# Patient Record
Sex: FEMALE | Race: WHITE | Employment: UNEMPLOYED | URBAN - METROPOLITAN AREA
[De-identification: names, ages, dates, MRNs, and addresses within clinical notes are randomized per-mention and may not be internally consistent; named-entity substitution may affect disease eponyms.]

---

## 2019-07-24 ENCOUNTER — HOSPITAL ENCOUNTER (OUTPATIENT)
Age: 24
Discharge: HOME | End: 2019-07-24

## 2019-07-24 ENCOUNTER — OFFICE VISIT (OUTPATIENT)
Dept: PULMONOLOGY | Age: 24
End: 2019-07-24
Payer: COMMERCIAL

## 2019-07-24 VITALS
SYSTOLIC BLOOD PRESSURE: 135 MMHG | HEART RATE: 96 BPM | RESPIRATION RATE: 18 BRPM | BODY MASS INDEX: 28.85 KG/M2 | OXYGEN SATURATION: 98 % | WEIGHT: 169 LBS | DIASTOLIC BLOOD PRESSURE: 85 MMHG | HEIGHT: 64 IN

## 2019-07-24 DIAGNOSIS — J45.41 MODERATE PERSISTENT ASTHMA WITH ACUTE EXACERBATION: Primary | ICD-10-CM

## 2019-07-24 DIAGNOSIS — K21.9 GASTROESOPHAGEAL REFLUX DISEASE WITHOUT ESOPHAGITIS: ICD-10-CM

## 2019-07-24 DIAGNOSIS — J45.41 MODERATE PERSISTENT ASTHMA WITH ACUTE EXACERBATION: ICD-10-CM

## 2019-07-24 LAB
BASOPHILS ABSOLUTE: 0.1 K/UL (ref 0–0.2)
BASOPHILS RELATIVE PERCENT: 0.7 %
EOSINOPHILS ABSOLUTE: 0.1 K/UL (ref 0–0.6)
EOSINOPHILS RELATIVE PERCENT: 0.8 %
HCT VFR BLD CALC: 45.1 % (ref 36–48)
HEMOGLOBIN: 15.2 G/DL (ref 12–16)
LYMPHOCYTES ABSOLUTE: 3.3 K/UL (ref 1–5.1)
LYMPHOCYTES RELATIVE PERCENT: 28.5 %
MCH RBC QN AUTO: 30.7 PG (ref 26–34)
MCHC RBC AUTO-ENTMCNC: 33.7 G/DL (ref 31–36)
MCV RBC AUTO: 91.2 FL (ref 80–100)
MONOCYTES ABSOLUTE: 0.9 K/UL (ref 0–1.3)
MONOCYTES RELATIVE PERCENT: 7.5 %
NEUTROPHILS ABSOLUTE: 7.1 K/UL (ref 1.7–7.7)
NEUTROPHILS RELATIVE PERCENT: 62.5 %
PDW BLD-RTO: 12.9 % (ref 12.4–15.4)
PLATELET # BLD: 349 K/UL (ref 135–450)
PMV BLD AUTO: 7.7 FL (ref 5–10.5)
RBC # BLD: 4.94 M/UL (ref 4–5.2)
WBC # BLD: 11.4 K/UL (ref 4–11)

## 2019-07-24 PROCEDURE — 99204 OFFICE O/P NEW MOD 45 MIN: CPT | Performed by: INTERNAL MEDICINE

## 2019-07-24 PROCEDURE — 85025 COMPLETE CBC W/AUTO DIFF WBC: CPT

## 2019-07-24 PROCEDURE — 82785 ASSAY OF IGE: CPT

## 2019-07-24 PROCEDURE — 86003 ALLG SPEC IGE CRUDE XTRC EA: CPT

## 2019-07-24 PROCEDURE — 36415 COLL VENOUS BLD VENIPUNCTURE: CPT

## 2019-07-24 RX ORDER — FLUTICASONE FUROATE AND VILANTEROL 200; 25 UG/1; UG/1
1 POWDER RESPIRATORY (INHALATION) DAILY
Qty: 1 EACH | Refills: 5 | Status: ON HOLD | OUTPATIENT
Start: 2019-07-24 | End: 2021-12-03

## 2019-07-24 RX ORDER — PREDNISONE 10 MG/1
10 TABLET ORAL 2 TIMES DAILY
Qty: 60 TABLET | Refills: 11 | Status: SHIPPED | OUTPATIENT
Start: 2019-07-24 | End: 2019-08-23

## 2019-07-24 RX ORDER — FLUTICASONE PROPIONATE 50 MCG
1 SPRAY, SUSPENSION (ML) NASAL DAILY
Status: ON HOLD | COMMUNITY
End: 2021-12-03

## 2019-07-24 RX ORDER — ALBUTEROL SULFATE 90 UG/1
2 AEROSOL, METERED RESPIRATORY (INHALATION) EVERY 6 HOURS PRN
Status: ON HOLD | COMMUNITY
End: 2022-09-11 | Stop reason: HOSPADM

## 2019-07-24 RX ORDER — MONTELUKAST SODIUM 10 MG/1
10 TABLET ORAL NIGHTLY
COMMUNITY
End: 2019-09-18 | Stop reason: SDUPTHER

## 2019-07-24 ASSESSMENT — ENCOUNTER SYMPTOMS
NAUSEA: 0
SINUS PRESSURE: 0
DIARRHEA: 0
ABDOMINAL PAIN: 0
CONSTIPATION: 0

## 2019-07-24 NOTE — PROGRESS NOTES
[facility]  Consult Note  Kisha Stinson MD       Lazarus Lora   YOB: 1995    Date of Visit:  7/24/2019    Assessment/Plan:  1. Moderate persistent asthma with acute exacerbation  Having an active flare up  Put her back on Prednisone 20 mg per day  Start her on Breo 200  Continue Singulair and albuterol    Have her get CBC with diff and Resp allergen profile before resuming Prednisone    2. Gastroesophageal reflux disease without esophagitis  Start her on Prilosec QHS as GERD could be a trigger for her asthma. Chief Complaint   Patient presents with    Asthma     NPV - pt is a self referral for her Asthma        HPI  The patient presents for evaluation of asthma. She is having a lot of cough and chest pain. She is taking her Albuterol Q4h. She is also taking Singulair. She has had a lot of flare ups. She has had several rounds of Prednisone which helped temporarily. She is still having chest pain and coughing a lot. She did take [facility] when she was a teenager. She is having GERD symptoms as well. She blames this on Singulair. She has more trouble when it is cold. She is getting sinus drainage. She has two cats. She does not have a feather pillow or down comforter. Review of Systems  Review of Systems   Constitutional: Negative for fatigue and fever. HENT: Negative for congestion and sinus pressure. Eyes: Negative for visual disturbance. Cardiovascular: Negative for chest pain and palpitations. Gastrointestinal: Negative for abdominal pain, constipation, diarrhea and nausea. Genitourinary: Negative for difficulty urinating. Musculoskeletal: Negative for arthralgias. Skin: Negative for rash. Neurological: Negative for dizziness and light-headedness. Hematological: Does not bruise/bleed easily. Psychiatric/Behavioral: Negative for behavioral problems. History  I have reviewed past medical, surgical, social and family history. 1129   BP: 135/85   Pulse: 96   Resp: 18   SpO2: 98%   Weight: 169 lb (76.7 kg)   Height: 5' 4\" (1.626 m)     Body mass index is 29.01 kg/m².      Wt Readings from Last 3 Encounters:   07/24/19 169 lb (76.7 kg)     BP Readings from Last 3 Encounters:   07/24/19 135/85        Social History     Tobacco Use   Smoking Status Never Smoker   Smokeless Tobacco Never Used

## 2019-07-27 LAB
2000687N OAK TREE IGE: <0.1 KU/L
ALLERGEN ASPERGILLUS ALTERNATA IGE: <0.1 KU/L
ALLERGEN ASPERGILLUS FUMIGATUS IGE: <0.1 KU/L
ALLERGEN BERMUDA GRASS IGE: <0.1 KU/L
ALLERGEN BIRCH IGE: <0.1 KU/L
ALLERGEN CAT DANDER IGE: <0.1 KU/L
ALLERGEN COMMON SHORT RAGWEED IGE: <0.1 KU/L
ALLERGEN COTTONWOOD: <0.1 KU/L
ALLERGEN COW MILK IGE: <0.1 KU/L
ALLERGEN DOG DANDER IGE: <0.1 KU/L
ALLERGEN ELM IGE: <0.1 KU/L
ALLERGEN FUNGI/MOLD M.RACEMOSUS IGE: <0.1 KU/L
ALLERGEN GERMAN COCKROACH IGE: <0.1 KU/L
ALLERGEN HORMODENDRUM HORDEI IGE: <0.1 KU/L
ALLERGEN MAPLE/BOX ELDER IGE: <0.1 KU/L
ALLERGEN MITE DUST FARINAE IGE: <0.1 KU/L
ALLERGEN MITE DUST PTERONYSSINUS IGE: <0.1 KU/L
ALLERGEN MOUNTAIN CEDAR: <0.1 KU/L
ALLERGEN MOUSE EPITHELIA IGE: <0.1 KU/L
ALLERGEN PEANUT (F13) IGE: <0.1 KU/L
ALLERGEN PECAN TREE IGE: <0.1 KU/L
ALLERGEN PENICILLIUM NOTATUM: <0.1 KU/L
ALLERGEN ROUGH PIGWEED (W14) IGE: <0.1 KU/L
ALLERGEN RUSSIAN THISTLE IGE: <0.1 KU/L
ALLERGEN SEE NOTE: NORMAL
ALLERGEN SHEEP SORREL (W18) IGE: <0.1 KU/L
ALLERGEN TIMOTHY GRASS: <0.1 KU/L
ALLERGEN TREE SYCAMORE: <0.1 KU/L
ALLERGEN WALNUT TREE IGE: <0.1 KU/L
ALLERGEN WHITE MULBERRY TREE, IGE: <0.1 KU/L
ALLERGEN, TREE, WHITE ASH IGE: <0.1 KU/L
IGE: 44 KU/L

## 2019-07-31 RX ORDER — ALBUTEROL SULFATE 90 UG/1
2 AEROSOL, METERED RESPIRATORY (INHALATION) EVERY 6 HOURS PRN
Qty: 1 INHALER | Refills: 5 | OUTPATIENT
Start: 2019-07-31

## 2019-08-07 ENCOUNTER — OFFICE VISIT (OUTPATIENT)
Dept: PULMONOLOGY | Age: 24
End: 2019-08-07
Payer: COMMERCIAL

## 2019-08-07 VITALS
WEIGHT: 173 LBS | RESPIRATION RATE: 16 BRPM | SYSTOLIC BLOOD PRESSURE: 118 MMHG | BODY MASS INDEX: 29.7 KG/M2 | HEART RATE: 67 BPM | OXYGEN SATURATION: 98 % | DIASTOLIC BLOOD PRESSURE: 66 MMHG

## 2019-08-07 DIAGNOSIS — J45.40 MODERATE PERSISTENT ASTHMA WITHOUT COMPLICATION: Primary | ICD-10-CM

## 2019-08-07 DIAGNOSIS — K21.9 GERD WITHOUT ESOPHAGITIS: ICD-10-CM

## 2019-08-07 PROCEDURE — 99213 OFFICE O/P EST LOW 20 MIN: CPT | Performed by: NURSE PRACTITIONER

## 2019-08-07 ASSESSMENT — ENCOUNTER SYMPTOMS
CHEST TIGHTNESS: 1
COLOR CHANGE: 0
COUGH: 1
CONSTIPATION: 0
ABDOMINAL PAIN: 0

## 2019-08-07 NOTE — PROGRESS NOTES
[facility]    Subjective:   CHIEF COMPLAINT / HPI: Recent asthma flare   The patient is 26 y.o. female who presents today for a routine follow up visit related to the above mentioned issues. There is a PMH significant for GERD. She was evaluated by Dr. Sheryl Zuleta last month. At that time symptoms were worse and she was treated for asthma flare with Prednisone. CBC, IgE and respiratory allergen profile were also requested. Presently she reports breathing improvement to 80% of baseline. She still has somewhat of a dry cough but this is much improved as well There are no fevers or chills. She does still report some chest tightness. She is back to doing some Yoga. She reports compliance with daily Breo, q4 GREG and QD Singulair. She is also taking Prilosec at night with some GERD symptoms persisting. History reviewed. No pertinent past medical history. Social History:    Social History     Tobacco Use   Smoking Status Never Smoker   Smokeless Tobacco Never Used     Current Medications:     Current Outpatient Medications on File Prior to Visit   Medication Sig Dispense Refill    montelukast (SINGULAIR) 10 MG tablet Take 10 mg by mouth nightly      fluticasone (FLONASE ALLERGY RELIEF) 50 MCG/ACT nasal spray 1 spray by Each Nostril route daily      diphenhydrAMINE HCl (BENADRYL ALLERGY PO) Take by mouth      albuterol sulfate  (90 Base) MCG/ACT inhaler Inhale 2 puffs into the lungs every 6 hours as needed for Wheezing      predniSONE (DELTASONE) 10 MG tablet Take 1 tablet by mouth 2 times daily 60 tablet 11    Fluticasone Furoate-Vilanterol (BREO ELLIPTA) 200-25 MCG/INH AEPB Inhale 1 puff into the lungs daily 1 each 5     No current facility-administered medications on file prior to visit. Review of Systems   Constitutional: Negative for chills and fever. HENT: Negative for congestion and postnasal drip.     Respiratory: Positive for cough and chest

## 2019-09-18 RX ORDER — MONTELUKAST SODIUM 10 MG/1
10 TABLET ORAL NIGHTLY
Qty: 30 TABLET | Refills: 5 | Status: ON HOLD | OUTPATIENT
Start: 2019-09-18 | End: 2021-12-03

## 2020-11-14 ENCOUNTER — APPOINTMENT (OUTPATIENT)
Dept: CT IMAGING | Age: 25
End: 2020-11-14
Payer: COMMERCIAL

## 2020-11-14 ENCOUNTER — HOSPITAL ENCOUNTER (EMERGENCY)
Age: 25
Discharge: HOME | End: 2020-11-14
Attending: EMERGENCY MEDICINE
Payer: COMMERCIAL

## 2020-11-14 VITALS
RESPIRATION RATE: 18 BRPM | TEMPERATURE: 98 F | BODY MASS INDEX: 35.05 KG/M2 | OXYGEN SATURATION: 99 % | HEART RATE: 71 BPM | SYSTOLIC BLOOD PRESSURE: 112 MMHG | WEIGHT: 190.48 LBS | DIASTOLIC BLOOD PRESSURE: 79 MMHG | HEIGHT: 62 IN

## 2020-11-14 LAB
ALBUMIN SERPL-MCNC: 4.5 G/DL (ref 3.4–5)
ALP BLD-CCNC: 46 U/L (ref 40–129)
ALT SERPL-CCNC: 15 U/L (ref 10–40)
ANION GAP SERPL CALCULATED.3IONS-SCNC: 10 MMOL/L (ref 3–16)
AST SERPL-CCNC: 16 U/L (ref 15–37)
BASOPHILS ABSOLUTE: 0.1 K/UL (ref 0–0.2)
BASOPHILS RELATIVE PERCENT: 0.7 %
BILIRUB SERPL-MCNC: 0.4 MG/DL (ref 0–1)
BILIRUBIN DIRECT: <0.2 MG/DL (ref 0–0.3)
BILIRUBIN URINE: NEGATIVE
BILIRUBIN, INDIRECT: NORMAL MG/DL (ref 0–1)
BLOOD, URINE: NEGATIVE
BUN BLDV-MCNC: 13 MG/DL (ref 7–20)
CALCIUM SERPL-MCNC: 9.7 MG/DL (ref 8.3–10.6)
CHLORIDE BLD-SCNC: 102 MMOL/L (ref 99–110)
CLARITY: CLEAR
CO2: 26 MMOL/L (ref 21–32)
COLOR: YELLOW
CREAT SERPL-MCNC: 0.8 MG/DL (ref 0.6–1.1)
EOSINOPHILS ABSOLUTE: 0.1 K/UL (ref 0–0.6)
EOSINOPHILS RELATIVE PERCENT: 1.3 %
GFR AFRICAN AMERICAN: >60
GFR NON-AFRICAN AMERICAN: >60
GLUCOSE BLD-MCNC: 93 MG/DL (ref 70–99)
GLUCOSE URINE: NEGATIVE MG/DL
HCG QUALITATIVE: NEGATIVE
HCT VFR BLD CALC: 46.3 % (ref 36–48)
HEMOGLOBIN: 15.6 G/DL (ref 12–16)
KETONES, URINE: NEGATIVE MG/DL
LEUKOCYTE ESTERASE, URINE: NEGATIVE
LIPASE: 29 U/L (ref 13–60)
LYMPHOCYTES ABSOLUTE: 4 K/UL (ref 1–5.1)
LYMPHOCYTES RELATIVE PERCENT: 40.2 %
MCH RBC QN AUTO: 31 PG (ref 26–34)
MCHC RBC AUTO-ENTMCNC: 33.8 G/DL (ref 31–36)
MCV RBC AUTO: 91.8 FL (ref 80–100)
MICROSCOPIC EXAMINATION: NORMAL
MONOCYTES ABSOLUTE: 0.7 K/UL (ref 0–1.3)
MONOCYTES RELATIVE PERCENT: 7.3 %
NEUTROPHILS ABSOLUTE: 5 K/UL (ref 1.7–7.7)
NEUTROPHILS RELATIVE PERCENT: 50.5 %
NITRITE, URINE: NEGATIVE
PDW BLD-RTO: 13.1 % (ref 12.4–15.4)
PH UA: 6 (ref 5–8)
PLATELET # BLD: 344 K/UL (ref 135–450)
PMV BLD AUTO: 7.1 FL (ref 5–10.5)
POTASSIUM REFLEX MAGNESIUM: 3.9 MMOL/L (ref 3.5–5.1)
PROTEIN UA: NEGATIVE MG/DL
RBC # BLD: 5.04 M/UL (ref 4–5.2)
SODIUM BLD-SCNC: 138 MMOL/L (ref 136–145)
SPECIFIC GRAVITY UA: 1.01 (ref 1–1.03)
TOTAL PROTEIN: 7.2 G/DL (ref 6.4–8.2)
URINE REFLEX TO CULTURE: NORMAL
URINE TYPE: NORMAL
UROBILINOGEN, URINE: 0.2 E.U./DL
WBC # BLD: 9.9 K/UL (ref 4–11)

## 2020-11-14 PROCEDURE — 36415 COLL VENOUS BLD VENIPUNCTURE: CPT

## 2020-11-14 PROCEDURE — 84703 CHORIONIC GONADOTROPIN ASSAY: CPT

## 2020-11-14 PROCEDURE — 2580000003 HC RX 258: Performed by: EMERGENCY MEDICINE

## 2020-11-14 PROCEDURE — 6360000002 HC RX W HCPCS: Performed by: EMERGENCY MEDICINE

## 2020-11-14 PROCEDURE — 85025 COMPLETE CBC W/AUTO DIFF WBC: CPT

## 2020-11-14 PROCEDURE — 99283 EMERGENCY DEPT VISIT LOW MDM: CPT

## 2020-11-14 PROCEDURE — 80076 HEPATIC FUNCTION PANEL: CPT

## 2020-11-14 PROCEDURE — 81003 URINALYSIS AUTO W/O SCOPE: CPT

## 2020-11-14 PROCEDURE — 96374 THER/PROPH/DIAG INJ IV PUSH: CPT

## 2020-11-14 PROCEDURE — 74177 CT ABD & PELVIS W/CONTRAST: CPT

## 2020-11-14 PROCEDURE — 80048 BASIC METABOLIC PNL TOTAL CA: CPT

## 2020-11-14 PROCEDURE — 6360000004 HC RX CONTRAST MEDICATION: Performed by: EMERGENCY MEDICINE

## 2020-11-14 PROCEDURE — 83690 ASSAY OF LIPASE: CPT

## 2020-11-14 RX ORDER — 0.9 % SODIUM CHLORIDE 0.9 %
1000 INTRAVENOUS SOLUTION INTRAVENOUS ONCE
Status: COMPLETED | OUTPATIENT
Start: 2020-11-14 | End: 2020-11-14

## 2020-11-14 RX ORDER — MORPHINE SULFATE 4 MG/ML
4 INJECTION, SOLUTION INTRAMUSCULAR; INTRAVENOUS ONCE
Status: COMPLETED | OUTPATIENT
Start: 2020-11-14 | End: 2020-11-14

## 2020-11-14 RX ORDER — HYDROCODONE BITARTRATE AND ACETAMINOPHEN 5; 325 MG/1; MG/1
1 TABLET ORAL EVERY 4 HOURS PRN
Qty: 12 TABLET | Refills: 0 | Status: SHIPPED | OUTPATIENT
Start: 2020-11-14 | End: 2020-11-17

## 2020-11-14 RX ADMIN — SODIUM CHLORIDE 1000 ML: 9 INJECTION, SOLUTION INTRAVENOUS at 04:32

## 2020-11-14 RX ADMIN — MORPHINE SULFATE 4 MG: 4 INJECTION, SOLUTION INTRAMUSCULAR; INTRAVENOUS at 04:32

## 2020-11-14 RX ADMIN — IOPAMIDOL 75 ML: 755 INJECTION, SOLUTION INTRAVENOUS at 04:16

## 2020-11-14 ASSESSMENT — PAIN SCALES - GENERAL
PAINLEVEL_OUTOF10: 7
PAINLEVEL_OUTOF10: 7
PAINLEVEL_OUTOF10: 0
PAINLEVEL_OUTOF10: 7

## 2020-11-14 ASSESSMENT — PAIN DESCRIPTION - DESCRIPTORS
DESCRIPTORS: DISCOMFORT
DESCRIPTORS: DISCOMFORT

## 2020-11-14 ASSESSMENT — ENCOUNTER SYMPTOMS
SHORTNESS OF BREATH: 0
EYE DISCHARGE: 0
ABDOMINAL PAIN: 1
DIARRHEA: 0
CONSTIPATION: 0
EYE ITCHING: 0
COUGH: 0
COLOR CHANGE: 0

## 2020-11-14 ASSESSMENT — PAIN DESCRIPTION - LOCATION
LOCATION: ABDOMEN

## 2020-11-14 ASSESSMENT — PAIN DESCRIPTION - PAIN TYPE
TYPE: ACUTE PAIN

## 2020-11-14 ASSESSMENT — PAIN DESCRIPTION - PROGRESSION
CLINICAL_PROGRESSION: RESOLVED
CLINICAL_PROGRESSION: GRADUALLY WORSENING

## 2020-11-14 ASSESSMENT — PAIN DESCRIPTION - ONSET: ONSET: ON-GOING

## 2020-11-14 ASSESSMENT — PAIN - FUNCTIONAL ASSESSMENT: PAIN_FUNCTIONAL_ASSESSMENT: ACTIVITIES ARE NOT PREVENTED

## 2020-11-14 ASSESSMENT — PAIN DESCRIPTION - ORIENTATION
ORIENTATION: LEFT;LOWER

## 2020-11-14 ASSESSMENT — PAIN DESCRIPTION - FREQUENCY: FREQUENCY: INTERMITTENT

## 2020-11-14 NOTE — ED PROVIDER NOTES
[facility] [facility]      Pt Name: Lynn Garcia  MRN: [id number]  [place] 1995  Date of evaluation: 11/14/2020  Provider: Tammy Estrada MD    CHIEF COMPLAINT       Chief Complaint   Patient presents with    Abdominal Pain     left lower, patient reports she has it all the time, but this time is much more intense, and she is tired of it. Reports no BM for a few days, which is normal for her. She reports he PCP diagnosed her with IBS. HISTORY OF PRESENT ILLNESS    Lynn Garica is a 23 y.o. female who presents to the emergency department with abdominal pain. LUQ abdominal pain. Pain is acute (today) on chronic (Years) 9/10 sharp and constant in nature. Nothing makes symptoms better but movement makes symptoms worse. This has never happened before. No other associated symptoms other than previously mentioned. PCP told her she has IBS. No vaginal complaints. Nursing Notes were reviewed. Including nursing noted for FM, Surgical History, Past Medical History, Social History, vitals, and allergies; agree with all. REVIEW OF SYSTEMS       Review of Systems   Constitutional: Negative for diaphoresis and unexpected weight change. HENT: Negative for congestion and dental problem. Eyes: Negative for discharge and itching. Respiratory: Negative for cough and shortness of breath. Cardiovascular: Negative for chest pain and leg swelling. Gastrointestinal: Positive for abdominal pain. Negative for constipation and diarrhea. Endocrine: Negative for cold intolerance and heat intolerance. Genitourinary: Negative for vaginal bleeding, vaginal discharge and vaginal pain. Musculoskeletal: Negative for neck pain and neck stiffness. Skin: Negative for color change and pallor. Neurological: Negative for tremors and weakness. Psychiatric/Behavioral: Negative for agitation and behavioral problems.        Except as noted above the remainder of None   Other Topics Concern    None   Social History Narrative    None       PHYSICAL EXAM       ED Triage Vitals   BP Temp Temp Source Pulse Resp SpO2 Height Weight   11/14/20 0310 11/14/20 0309 11/14/20 0309 11/14/20 0309 11/14/20 0310 11/14/20 0310 11/14/20 0405 11/14/20 0309   106/76 98.2 °F (36.8 °C) Oral 72 16 98 % 5' 2\" (1.575 m) 190 lb 7.6 oz (86.4 kg)       Physical Exam  Vitals signs and nursing note reviewed. Constitutional:       General: She is not in acute distress. Appearance: She is well-developed. She is not ill-appearing, toxic-appearing or diaphoretic. HENT:      Head: Normocephalic and atraumatic. Right Ear: External ear normal.      Left Ear: External ear normal.   Eyes:      General:         Right eye: No discharge. Left eye: No discharge. Conjunctiva/sclera: Conjunctivae normal.      Pupils: Pupils are equal, round, and reactive to light. Neck:      Musculoskeletal: Normal range of motion and neck supple. Cardiovascular:      Rate and Rhythm: Normal rate and regular rhythm. Heart sounds: No murmur. Pulmonary:      Effort: Pulmonary effort is normal. No respiratory distress. Breath sounds: Normal breath sounds. No wheezing or rales. Abdominal:      General: Bowel sounds are normal. There is no distension. Palpations: Abdomen is soft. There is no mass. Tenderness: There is no abdominal tenderness. There is no guarding or rebound. Genitourinary:     Comments: Deferred  Musculoskeletal: Normal range of motion. General: No deformity. Skin:     General: Skin is warm. Findings: No erythema or rash. Neurological:      Mental Status: She is alert and oriented to person, place, and time. She is not disoriented. Cranial Nerves: No cranial nerve deficit. Motor: No atrophy or abnormal muscle tone. Coordination: Coordination normal.   Psychiatric:         Behavior: Behavior normal.         Thought Content:  Thought usage, Tobacco usage, Drug usage reviewed by myself, no pertinent Hx)- No Pertinent Hx     Old records were reviewed by me    Discussed CT findings Ruptured Cyst? No Lower abdominal pain. Ob/Gyn follow up. Well appearing non toxic. Feels better     Short course pain medicine    OARRS report reviewed. Tolerating po    I estimate there is LOW risk for Sepsis, MI, Stroke, Tamponade, PTX, Toxicity or other life threatening etiology thus I consider the discharge disposition reasonable. The patient is at low risk for mortality based on demographic, history and clinical factors. Given the best available information and clinical assessment, I estimate the risk of hospitalization to be greater than risk of treatment at home. I have explained to the patient that the risk could rapidly change, given precautions for return and instructions. Explained to patient that the risk for mortality is low based on demographic, history and clinical factors. I discussed with patient the results of evaluation in the ED, diagnosis, care, and prognosis. The plan is to discharge to home. Patient is in agreement with plan and questions have been answered. I also discussed with patient the reasons which may require a return visit and the importance of follow-up care. The patient is well-appearing, nontoxic, and improved at the time of discharge. Patient agrees to call to arrange follow-up care as directed. Patient understands to return immediately for worsening/change in symptoms. CRITICAL CARE TIME   Total Critical Caretime was 21 minutes, excluding separately reportable procedures. There was a high probability of clinically significant/life threatening deterioration in the patient's condition which required my urgent intervention. PROCEDURES:  Unlessotherwise noted below, none    FINAL IMPRESSION      1. Free fluid in pelvis    2.  Left upper quadrant abdominal pain          DISPOSITION/PLAN DISPOSITION Decision To Discharge 11/14/2020 04:53:52 AM    PATIENT REFERRED TO:  William Fraser MD  [address] [phone]    Call today        DISCHARGE MEDICATIONS:  New Prescriptions    HYDROCODONE-ACETAMINOPHEN (NORCO) 5-325 MG PER TABLET    Take 1 tablet by mouth every 4 hours as needed for Pain for up to 3 days. Intended supply: 3 days.  Take lowest dose possible to manage pain          (Please note that portions ofthis note were completed with a voice recognition program.  Efforts were made to edit the dictations but occasionally words are mis-transcribed.)    Nat Richards MD(electronically signed)  Attending Emergency Physician           Nat Richards MD  11/14/20 0235

## 2020-12-02 ENCOUNTER — TELEPHONE (OUTPATIENT)
Dept: EMERGENCY DEPT | Age: 25
End: 2020-12-02

## 2020-12-02 NOTE — TELEPHONE ENCOUNTER
[employer] (MSM) reviewed patient chart to determine patient's level of substance use risk. MSM determined that patient does not need MSM outreach at this time.

## 2021-12-02 ENCOUNTER — HOSPITAL ENCOUNTER (INPATIENT)
Age: 26
LOS: 5 days | Discharge: HOME | DRG: 917 | End: 2021-12-08
Attending: EMERGENCY MEDICINE | Admitting: STUDENT IN AN ORGANIZED HEALTH CARE EDUCATION/TRAINING PROGRAM
Payer: COMMERCIAL

## 2021-12-02 ENCOUNTER — APPOINTMENT (OUTPATIENT)
Dept: GENERAL RADIOLOGY | Age: 26
DRG: 917 | End: 2021-12-02
Payer: COMMERCIAL

## 2021-12-02 ENCOUNTER — APPOINTMENT (OUTPATIENT)
Dept: CT IMAGING | Age: 26
DRG: 917 | End: 2021-12-02
Payer: COMMERCIAL

## 2021-12-02 DIAGNOSIS — R79.89 ELEVATED LFTS: ICD-10-CM

## 2021-12-02 DIAGNOSIS — R55 SYNCOPE AND COLLAPSE: ICD-10-CM

## 2021-12-02 DIAGNOSIS — R10.31 ABDOMINAL PAIN, RIGHT LOWER QUADRANT: ICD-10-CM

## 2021-12-02 DIAGNOSIS — R11.2 NON-INTRACTABLE VOMITING WITH NAUSEA, UNSPECIFIED VOMITING TYPE: Primary | ICD-10-CM

## 2021-12-02 LAB
A/G RATIO: 1.7 (ref 1.1–2.2)
ACETAMINOPHEN LEVEL: <5 UG/ML (ref 10–30)
ALBUMIN SERPL-MCNC: 4.5 G/DL (ref 3.4–5)
ALP BLD-CCNC: 165 U/L (ref 40–129)
ALT SERPL-CCNC: 455 U/L (ref 10–40)
ANION GAP SERPL CALCULATED.3IONS-SCNC: 13 MMOL/L (ref 3–16)
APTT: 38.2 SEC (ref 26.2–38.6)
AST SERPL-CCNC: 130 U/L (ref 15–37)
BASOPHILS ABSOLUTE: 0 K/UL (ref 0–0.2)
BASOPHILS RELATIVE PERCENT: 0.7 %
BILIRUB SERPL-MCNC: 2.4 MG/DL (ref 0–1)
BILIRUBIN URINE: NEGATIVE
BLOOD, URINE: NEGATIVE
BUN BLDV-MCNC: 9 MG/DL (ref 7–20)
CALCIUM SERPL-MCNC: 9.4 MG/DL (ref 8.3–10.6)
CHLORIDE BLD-SCNC: 99 MMOL/L (ref 99–110)
CLARITY: CLEAR
CO2: 26 MMOL/L (ref 21–32)
COLOR: YELLOW
CREAT SERPL-MCNC: 0.8 MG/DL (ref 0.6–1.1)
D DIMER: <200 NG/ML DDU (ref 0–229)
EOSINOPHILS ABSOLUTE: 0.3 K/UL (ref 0–0.6)
EOSINOPHILS RELATIVE PERCENT: 4.4 %
ETHANOL: NORMAL MG/DL (ref 0–0.08)
GFR AFRICAN AMERICAN: >60
GFR NON-AFRICAN AMERICAN: >60
GLUCOSE BLD-MCNC: 100 MG/DL (ref 70–99)
GLUCOSE URINE: NEGATIVE MG/DL
HAV IGM SER IA-ACNC: NORMAL
HCG(URINE) PREGNANCY TEST: NEGATIVE
HCT VFR BLD CALC: 44 % (ref 36–48)
HEMOGLOBIN: 14.6 G/DL (ref 12–16)
HEPATITIS B CORE IGM ANTIBODY: NORMAL
HEPATITIS B SURFACE ANTIGEN INTERPRETATION: NORMAL
HEPATITIS C ANTIBODY INTERPRETATION: NORMAL
INR BLD: 0.99 (ref 0.88–1.12)
KETONES, URINE: NEGATIVE MG/DL
LEUKOCYTE ESTERASE, URINE: NEGATIVE
LIPASE: 29 U/L (ref 13–60)
LYMPHOCYTES ABSOLUTE: 2.1 K/UL (ref 1–5.1)
LYMPHOCYTES RELATIVE PERCENT: 30.8 %
MCH RBC QN AUTO: 30.3 PG (ref 26–34)
MCHC RBC AUTO-ENTMCNC: 33.2 G/DL (ref 31–36)
MCV RBC AUTO: 91.4 FL (ref 80–100)
MICROSCOPIC EXAMINATION: NORMAL
MONOCYTES ABSOLUTE: 0.6 K/UL (ref 0–1.3)
MONOCYTES RELATIVE PERCENT: 9.6 %
NEUTROPHILS ABSOLUTE: 3.7 K/UL (ref 1.7–7.7)
NEUTROPHILS RELATIVE PERCENT: 54.5 %
NITRITE, URINE: NEGATIVE
PDW BLD-RTO: 13.1 % (ref 12.4–15.4)
PH UA: 7 (ref 5–8)
PLATELET # BLD: 306 K/UL (ref 135–450)
PMV BLD AUTO: 7.2 FL (ref 5–10.5)
POTASSIUM REFLEX MAGNESIUM: 4.2 MMOL/L (ref 3.5–5.1)
PROTEIN UA: NEGATIVE MG/DL
PROTHROMBIN TIME: 11.2 SEC (ref 9.9–12.7)
RBC # BLD: 4.82 M/UL (ref 4–5.2)
SARS-COV-2, NAAT: NOT DETECTED
SODIUM BLD-SCNC: 138 MMOL/L (ref 136–145)
SPECIFIC GRAVITY UA: 1.01 (ref 1–1.03)
TOTAL PROTEIN: 7.1 G/DL (ref 6.4–8.2)
TROPONIN: <0.01 NG/ML
TROPONIN: <0.01 NG/ML
URINE REFLEX TO CULTURE: NORMAL
URINE TYPE: NORMAL
UROBILINOGEN, URINE: 0.2 E.U./DL
WBC # BLD: 6.7 K/UL (ref 4–11)

## 2021-12-02 PROCEDURE — 83690 ASSAY OF LIPASE: CPT

## 2021-12-02 PROCEDURE — 87635 SARS-COV-2 COVID-19 AMP PRB: CPT

## 2021-12-02 PROCEDURE — 74177 CT ABD & PELVIS W/CONTRAST: CPT

## 2021-12-02 PROCEDURE — 96375 TX/PRO/DX INJ NEW DRUG ADDON: CPT

## 2021-12-02 PROCEDURE — 96365 THER/PROPH/DIAG IV INF INIT: CPT

## 2021-12-02 PROCEDURE — 36415 COLL VENOUS BLD VENIPUNCTURE: CPT

## 2021-12-02 PROCEDURE — 81003 URINALYSIS AUTO W/O SCOPE: CPT

## 2021-12-02 PROCEDURE — 6360000002 HC RX W HCPCS: Performed by: EMERGENCY MEDICINE

## 2021-12-02 PROCEDURE — 80074 ACUTE HEPATITIS PANEL: CPT

## 2021-12-02 PROCEDURE — 84484 ASSAY OF TROPONIN QUANT: CPT

## 2021-12-02 PROCEDURE — 85025 COMPLETE CBC W/AUTO DIFF WBC: CPT

## 2021-12-02 PROCEDURE — 84703 CHORIONIC GONADOTROPIN ASSAY: CPT

## 2021-12-02 PROCEDURE — 85730 THROMBOPLASTIN TIME PARTIAL: CPT

## 2021-12-02 PROCEDURE — 99283 EMERGENCY DEPT VISIT LOW MDM: CPT

## 2021-12-02 PROCEDURE — 71045 X-RAY EXAM CHEST 1 VIEW: CPT

## 2021-12-02 PROCEDURE — 85610 PROTHROMBIN TIME: CPT

## 2021-12-02 PROCEDURE — 96361 HYDRATE IV INFUSION ADD-ON: CPT

## 2021-12-02 PROCEDURE — 2580000003 HC RX 258

## 2021-12-02 PROCEDURE — 85379 FIBRIN DEGRADATION QUANT: CPT

## 2021-12-02 PROCEDURE — 80053 COMPREHEN METABOLIC PANEL: CPT

## 2021-12-02 PROCEDURE — 2580000003 HC RX 258: Performed by: EMERGENCY MEDICINE

## 2021-12-02 PROCEDURE — 6360000004 HC RX CONTRAST MEDICATION: Performed by: EMERGENCY MEDICINE

## 2021-12-02 PROCEDURE — 80143 DRUG ASSAY ACETAMINOPHEN: CPT

## 2021-12-02 PROCEDURE — 82077 ASSAY SPEC XCP UR&BREATH IA: CPT

## 2021-12-02 PROCEDURE — 80307 DRUG TEST PRSMV CHEM ANLYZR: CPT

## 2021-12-02 PROCEDURE — 93005 ELECTROCARDIOGRAM TRACING: CPT | Performed by: EMERGENCY MEDICINE

## 2021-12-02 PROCEDURE — 6360000002 HC RX W HCPCS

## 2021-12-02 RX ORDER — ONDANSETRON 2 MG/ML
8 INJECTION INTRAMUSCULAR; INTRAVENOUS ONCE
Status: COMPLETED | OUTPATIENT
Start: 2021-12-02 | End: 2021-12-02

## 2021-12-02 RX ORDER — 0.9 % SODIUM CHLORIDE 0.9 %
2000 INTRAVENOUS SOLUTION INTRAVENOUS ONCE
Status: COMPLETED | OUTPATIENT
Start: 2021-12-02 | End: 2021-12-02

## 2021-12-02 RX ADMIN — ACETYLCYSTEINE 12820 MG: 200 INJECTION, SOLUTION INTRAVENOUS at 23:42

## 2021-12-02 RX ADMIN — SODIUM CHLORIDE 2000 ML: 9 INJECTION, SOLUTION INTRAVENOUS at 18:15

## 2021-12-02 RX ADMIN — IOPAMIDOL 75 ML: 755 INJECTION, SOLUTION INTRAVENOUS at 19:51

## 2021-12-02 RX ADMIN — ONDANSETRON 8 MG: 2 INJECTION INTRAMUSCULAR; INTRAVENOUS at 18:16

## 2021-12-02 ASSESSMENT — PAIN SCALES - GENERAL: PAINLEVEL_OUTOF10: 7

## 2021-12-02 ASSESSMENT — PAIN DESCRIPTION - ORIENTATION: ORIENTATION: RIGHT;LOWER

## 2021-12-02 ASSESSMENT — PAIN DESCRIPTION - DESCRIPTORS: DESCRIPTORS: SHARP;STABBING

## 2021-12-02 ASSESSMENT — PAIN DESCRIPTION - LOCATION: LOCATION: ABDOMEN

## 2021-12-02 NOTE — ED PROVIDER NOTES
[facility] [facility]      Pt Name: Meera Nelson  MRN: [id number]  [place] 1995  Date of evaluation: 2021  Provider: Clari Levine, [facility]  Chief Complaint   Patient presents with    Abdominal Pain     x6 days, RLQ    Emesis     x6 days       I wore personal protective equipment when I was in the room the entire time. This includes gloves, N95 mask, face shield, and a glove over my stethoscope for protection. HPI  Meera Nelson is a 27 y.o. female who presents with nausea and vomiting but cannot quantitate the vomiting. She states it occurs every time she eats but then states she cannot tell me time she vomited. She also states that she has had dry heaves. She had fever on the first day. She has had no fever since that time. Her last menstrual period was 3 weeks ago. She does not think she is pregnant. She has admit to lightheadedness and had 2 syncopal episodes in the past 48 hours. She denies any shortness of breath. She denies any coughing or loss of taste or smell. She has had chest pain with vomiting but not at any other time. Her only surgery is a laparoscopy that it was not recent. She still has her appendix and gallbladder. She denies any . She has had diarrhea on the first and second days but none since that time. She states this occurs every time she eats. She has been taking over-the-counter antiemetics and it has made things worse. ? REVIEW OF SYSTEMS  All systems negative except as noted in the HPI. Reviewed Nurses' notes and concur. Patient's last menstrual period was 2021. PAST MEDICAL HISTORY  History reviewed. No pertinent past medical history. FAMILY HISTORY  History reviewed. No pertinent family history. SOCIAL HISTORY   reports that she has never smoked. She has never used smokeless tobacco. She reports that she does not use drugs.     SURGICAL HISTORY  Past Surgical History:   Procedure Laterality Date    LAPAROSCOPY         CURRENT MEDICATIONS  Current Outpatient Rx   Medication Sig Dispense Refill    montelukast (SINGULAIR) 10 MG tablet Take 1 tablet by mouth nightly 30 tablet 5    fluticasone (FLONASE ALLERGY RELIEF) 50 MCG/ACT nasal spray 1 spray by Each Nostril route daily      diphenhydrAMINE HCl (BENADRYL ALLERGY PO) Take by mouth      albuterol sulfate  (90 Base) MCG/ACT inhaler Inhale 2 puffs into the lungs every 6 hours as needed for Wheezing      Fluticasone Furoate-Vilanterol (BREO ELLIPTA) 200-25 MCG/INH AEPB Inhale 1 puff into the lungs daily 1 each 5       ALLERGIES  No Known Allergies      PHYSICAL EXAM  VITAL SIGNS: /84   Pulse 70   Temp 98.7 °F (37.1 °C) (Core)   Resp 12   Ht 5' 5\" (1.651 m)   Wt 188 lb 4.4 oz (85.4 kg)   LMP 11/11/2021   SpO2 98%   BMI 31.33 kg/m²    Constitutional: Well-developed, well-nourished, appears normal, nontoxic, activity: Resting comfortably on the cart, no obvious pain, was able to ambulate back to the emergency department without syncope or lightheadedness. I observed her ambulating. HEENT: Normocephalic, Atraumatic, Bilateral ears are normal, Oropharynx moist, No oral exudates, Nose normal.  Eyes: PERRLA, EOMI, Conjunctiva normal, No discharge. No scleral icterus. Neck: Normal range of motion, No tenderness, Supple,  Lymphatic: No lymphadenopathy noted. Cardiovascular: Normal heart rate, Normal rhythm, no murmurs, no gallops, no rubs. Thorax & Lungs: Normal breath sounds, No respiratory distress, No wheezing,  Abdomen: Soft, mild right lower quadrant tender with no guarding, no rebound, no rigidity; no distension, no masses, no pulsatile masses, no hepatosplenomegaly, normal bowel sounds. Skin: Warm, Dry, No erythema, No rash. Back: No tenderness, Full range of motion, No scoliosis. Extremities: No edema, No tenderness, No cyanosis, No clubbing.  No amputations, capillary refill less than 2 seconds. Musculoskeletal: Good range of motion in all major joints, No tenderness to palpation or major deformities noted. Neurologic: Alert & oriented x 3  Psychiatric: Affect normal, Judgment normal, Mood normal.    LABORATORY  Labs Reviewed   COMPREHENSIVE METABOLIC PANEL W/ REFLEX TO MG FOR LOW K - Abnormal; Notable for the following components:       Result Value    Glucose 100 (*)     Total Bilirubin 2.4 (*)     Alkaline Phosphatase 165 (*)      (*)      (*)     All other components within normal limits    Narrative:     Performed at:  [facility]  [address] [facility] [place] [address]   Phone [phone]   CBC WITH AUTO DIFFERENTIAL    Narrative:     Performed at:  [facility]  [address] [facility] [place] [address]   Phone [phone]   LIPASE    Narrative:     Performed at:  [facility]  [address] [facility] [place] [address]   Phone [phone]   URINE RT REFLEX TO CULTURE    Narrative:     Performed at:  [facility]  [address] [facility] [place] [address]   Phone [phone]   TROPONIN    Narrative:     Performed at:  [facility]  [address] [facility] [place] [address]   Phone [phone]   PREGNANCY, URINE    Narrative:     Performed at:  [facility]  [address] [facility] [place] [address]   Phone [phone]       EKG Interpretation    Interpreted by emergency department physician  Time performed: 9258  Time read: 1831    Rhythm: Sinus  Ventricular Rate: 70  QRS Axis: 73  Ectopy: None  Conduction: Normal sinus rhythm with sinus arrhythmia. There is an RSR prime in V1. Appears to be left atrial enlargement. There is LVH by voltage with early repolarization abnormalities.   Liver  ST Segments: Consistent with early repolarization abnormalities  T Waves: Consistent with early repolarization abnormalities  Q Waves: None noted    Other findings: Motion artifact but EKG is readable    Compared to EKG on: None to compare    Clinical Impression: Normal sinus rhythm with sinus arrhythmia and left atrial enlargement. There is no old EKG to compare. There is motion artifact but EKG is readable. David 149, DO      RADIOLOGY/PROCEDURES  I personally reviewed the images for this case. CT ABDOMEN PELVIS W IV CONTRAST Additional Contrast? None    (Results Pending)        COURSE & MEDICAL DECISION MAKING  Pertinent Labs, EKG, & Imaging studies reviewed. (See chart for details)    Vitals:    12/02/21 1715   BP: 127/84   Pulse: 70   Resp: 12   Temp: 98.7 °F (37.1 °C)   TempSrc: Core   SpO2: 98%   Weight: 188 lb 4.4 oz (85.4 kg)   Height: 5' 5\" (1.651 m)       Medications   0.9 % sodium chloride bolus (2,000 mLs IntraVENous New Bag 12/2/21 1815)   ondansetron (ZOFRAN) injection 8 mg (8 mg IntraVENous Given 12/2/21 1816)   iopamidol (ISOVUE-370) 76 % injection 75 mL (75 mLs IntraVENous Given 12/2/21 1951)       New Prescriptions    No medications on file       SEP-1 CORE MEASURE DATA  Exclusion criteria: the patient is NOT to be included for sepsis due to:  SIRS criteria are not met      Patient remained stable in the ED. her CAT scan was delayed secondary to pregnancy test not being performed. They did not receive a tube for the serum hCG. Therefore is changed over to a urine hCG. That test is pending as well as her CT and CTA reading. This case was turned over to Dr. Sherly Molina at 2000 pending test results. The patient's blood pressure was found to be elevated according to CMS/Medicare and the Affordable Care Act/ObamaCare criteria. Elevated blood pressure could occur because of pain or anxiety or other reasons and does not mean that they need to have their blood pressure treated or medications otherwise adjusted.  However, this could also be a sign that they will need to have their blood pressure treated or medications changed. The patient was instructed to follow up closely with their personal physician to have their blood pressure rechecked. The patient was instructed to take a list of recent blood pressure readings to their next visit with their personal physician. See discharge instructions for specific medications, discharge information, and treatments. They were verbally instructed to return to emergency if any problems. (This chart has been completed using [address]. Although attempts have been made to ensure accuracy, words and/or phrases may not be transcribed as intended.)    Patient refused pain medicines at the time of her exam.    IMPRESSION(S):  1. Non-intractable vomiting with nausea, unspecified vomiting type    2. Syncope and collapse    3. Abdominal pain, right lower quadrant        ? Recheck Times: 1954    Diagnostic considerations include but are not limited to:  gastritis, kidney stone, pyelonephritis, UTI, cholecystitis, cholelithiasis, appendicitis, PID, STD, abdominal contusion, bowel infarction, pancreatitis, gastritis, peptic ulcer disease, gastroenteritis, AAA.          Summer Roe,   12/02/21 1955

## 2021-12-03 ENCOUNTER — APPOINTMENT (OUTPATIENT)
Dept: ULTRASOUND IMAGING | Age: 26
DRG: 917 | End: 2021-12-03
Payer: COMMERCIAL

## 2021-12-03 ENCOUNTER — APPOINTMENT (OUTPATIENT)
Dept: CT IMAGING | Age: 26
DRG: 917 | End: 2021-12-03
Payer: COMMERCIAL

## 2021-12-03 PROBLEM — R55 SYNCOPE AND COLLAPSE: Status: ACTIVE | Noted: 2021-12-03

## 2021-12-03 PROBLEM — R11.2 INTRACTABLE NAUSEA AND VOMITING: Status: ACTIVE | Noted: 2021-12-03

## 2021-12-03 PROBLEM — E80.6 HYPERBILIRUBINEMIA: Status: ACTIVE | Noted: 2021-12-03

## 2021-12-03 PROBLEM — R55 SYNCOPE: Status: ACTIVE | Noted: 2021-12-03

## 2021-12-03 PROBLEM — R51.9 HEADACHE: Status: ACTIVE | Noted: 2021-12-03

## 2021-12-03 PROBLEM — R79.89 ELEVATED LFTS: Status: ACTIVE | Noted: 2021-12-03

## 2021-12-03 LAB
A/G RATIO: 1.1 (ref 1.1–2.2)
A/G RATIO: 1.4 (ref 1.1–2.2)
ALBUMIN SERPL-MCNC: 3.6 G/DL (ref 3.4–5)
ALBUMIN SERPL-MCNC: 4 G/DL (ref 3.4–5)
ALP BLD-CCNC: 143 U/L (ref 40–129)
ALP BLD-CCNC: 146 U/L (ref 40–129)
ALT SERPL-CCNC: 445 U/L (ref 10–40)
ALT SERPL-CCNC: 479 U/L (ref 10–40)
AMPHETAMINE SCREEN, URINE: NORMAL
ANION GAP SERPL CALCULATED.3IONS-SCNC: 15 MMOL/L (ref 3–16)
ANION GAP SERPL CALCULATED.3IONS-SCNC: 18 MMOL/L (ref 3–16)
AST SERPL-CCNC: 154 U/L (ref 15–37)
AST SERPL-CCNC: 159 U/L (ref 15–37)
BARBITURATE SCREEN URINE: NORMAL
BASOPHILS ABSOLUTE: 0.1 K/UL (ref 0–0.2)
BASOPHILS RELATIVE PERCENT: 0.5 %
BENZODIAZEPINE SCREEN, URINE: NORMAL
BILIRUB SERPL-MCNC: 2.5 MG/DL (ref 0–1)
BILIRUB SERPL-MCNC: 2.7 MG/DL (ref 0–1)
BUN BLDV-MCNC: 7 MG/DL (ref 7–20)
BUN BLDV-MCNC: 8 MG/DL (ref 7–20)
CALCIUM SERPL-MCNC: 8.9 MG/DL (ref 8.3–10.6)
CALCIUM SERPL-MCNC: 8.9 MG/DL (ref 8.3–10.6)
CANNABINOID SCREEN URINE: NORMAL
CHLORIDE BLD-SCNC: 101 MMOL/L (ref 99–110)
CHLORIDE BLD-SCNC: 101 MMOL/L (ref 99–110)
CO2: 20 MMOL/L (ref 21–32)
CO2: 21 MMOL/L (ref 21–32)
COCAINE METABOLITE SCREEN URINE: NORMAL
CREAT SERPL-MCNC: 0.6 MG/DL (ref 0.6–1.1)
CREAT SERPL-MCNC: 0.6 MG/DL (ref 0.6–1.1)
EKG ATRIAL RATE: 70 BPM
EKG DIAGNOSIS: NORMAL
EKG P AXIS: 70 DEGREES
EKG P-R INTERVAL: 122 MS
EKG Q-T INTERVAL: 396 MS
EKG QRS DURATION: 88 MS
EKG QTC CALCULATION (BAZETT): 427 MS
EKG R AXIS: 73 DEGREES
EKG T AXIS: 66 DEGREES
EKG VENTRICULAR RATE: 70 BPM
EOSINOPHILS ABSOLUTE: 0.2 K/UL (ref 0–0.6)
EOSINOPHILS RELATIVE PERCENT: 1.6 %
GFR AFRICAN AMERICAN: >60
GFR AFRICAN AMERICAN: >60
GFR NON-AFRICAN AMERICAN: >60
GFR NON-AFRICAN AMERICAN: >60
GLUCOSE BLD-MCNC: 136 MG/DL (ref 70–99)
GLUCOSE BLD-MCNC: 92 MG/DL (ref 70–99)
HCT VFR BLD CALC: 43.6 % (ref 36–48)
HEMOGLOBIN: 14.3 G/DL (ref 12–16)
INR BLD: 0.96 (ref 0.88–1.12)
LV EF: 63 %
LVEF MODALITY: NORMAL
LYMPHOCYTES ABSOLUTE: 2.1 K/UL (ref 1–5.1)
LYMPHOCYTES RELATIVE PERCENT: 20.1 %
Lab: NORMAL
MAGNESIUM: 2 MG/DL (ref 1.8–2.4)
MAGNESIUM: 2.1 MG/DL (ref 1.8–2.4)
MCH RBC QN AUTO: 30.2 PG (ref 26–34)
MCHC RBC AUTO-ENTMCNC: 32.8 G/DL (ref 31–36)
MCV RBC AUTO: 92.3 FL (ref 80–100)
METHADONE SCREEN, URINE: NORMAL
MONOCYTES ABSOLUTE: 1.1 K/UL (ref 0–1.3)
MONOCYTES RELATIVE PERCENT: 10.4 %
NEUTROPHILS ABSOLUTE: 6.9 K/UL (ref 1.7–7.7)
NEUTROPHILS RELATIVE PERCENT: 67.4 %
OPIATE SCREEN URINE: NORMAL
OXYCODONE URINE: NORMAL
PDW BLD-RTO: 12.8 % (ref 12.4–15.4)
PH UA: 7
PHENCYCLIDINE SCREEN URINE: NORMAL
PLATELET # BLD: 292 K/UL (ref 135–450)
PMV BLD AUTO: 7.4 FL (ref 5–10.5)
POTASSIUM SERPL-SCNC: 3.4 MMOL/L (ref 3.5–5.1)
POTASSIUM SERPL-SCNC: 3.7 MMOL/L (ref 3.5–5.1)
PROPOXYPHENE SCREEN: NORMAL
PROTHROMBIN TIME: 10.8 SEC (ref 9.9–12.7)
RBC # BLD: 4.72 M/UL (ref 4–5.2)
SODIUM BLD-SCNC: 136 MMOL/L (ref 136–145)
SODIUM BLD-SCNC: 140 MMOL/L (ref 136–145)
TOTAL PROTEIN: 6.8 G/DL (ref 6.4–8.2)
TOTAL PROTEIN: 6.8 G/DL (ref 6.4–8.2)
TROPONIN: <0.01 NG/ML
TROPONIN: <0.01 NG/ML
WBC # BLD: 10.2 K/UL (ref 4–11)

## 2021-12-03 PROCEDURE — 85610 PROTHROMBIN TIME: CPT

## 2021-12-03 PROCEDURE — 83735 ASSAY OF MAGNESIUM: CPT

## 2021-12-03 PROCEDURE — 2580000003 HC RX 258: Performed by: NURSE PRACTITIONER

## 2021-12-03 PROCEDURE — 70450 CT HEAD/BRAIN W/O DYE: CPT

## 2021-12-03 PROCEDURE — 93010 ELECTROCARDIOGRAM REPORT: CPT | Performed by: INTERNAL MEDICINE

## 2021-12-03 PROCEDURE — 93306 TTE W/DOPPLER COMPLETE: CPT

## 2021-12-03 PROCEDURE — 94640 AIRWAY INHALATION TREATMENT: CPT

## 2021-12-03 PROCEDURE — 84484 ASSAY OF TROPONIN QUANT: CPT

## 2021-12-03 PROCEDURE — 2580000003 HC RX 258: Performed by: INTERNAL MEDICINE

## 2021-12-03 PROCEDURE — 6360000002 HC RX W HCPCS: Performed by: STUDENT IN AN ORGANIZED HEALTH CARE EDUCATION/TRAINING PROGRAM

## 2021-12-03 PROCEDURE — 6360000002 HC RX W HCPCS

## 2021-12-03 PROCEDURE — 1200000000 HC SEMI PRIVATE

## 2021-12-03 PROCEDURE — 85025 COMPLETE CBC W/AUTO DIFF WBC: CPT

## 2021-12-03 PROCEDURE — 2580000003 HC RX 258

## 2021-12-03 PROCEDURE — 6360000002 HC RX W HCPCS: Performed by: NURSE PRACTITIONER

## 2021-12-03 PROCEDURE — 6370000000 HC RX 637 (ALT 250 FOR IP): Performed by: INTERNAL MEDICINE

## 2021-12-03 PROCEDURE — 6360000002 HC RX W HCPCS: Performed by: EMERGENCY MEDICINE

## 2021-12-03 PROCEDURE — 6360000002 HC RX W HCPCS: Performed by: INTERNAL MEDICINE

## 2021-12-03 PROCEDURE — 2580000003 HC RX 258: Performed by: STUDENT IN AN ORGANIZED HEALTH CARE EDUCATION/TRAINING PROGRAM

## 2021-12-03 PROCEDURE — 80053 COMPREHEN METABOLIC PANEL: CPT

## 2021-12-03 PROCEDURE — 76700 US EXAM ABDOM COMPLETE: CPT

## 2021-12-03 PROCEDURE — 96374 THER/PROPH/DIAG INJ IV PUSH: CPT

## 2021-12-03 PROCEDURE — 96375 TX/PRO/DX INJ NEW DRUG ADDON: CPT

## 2021-12-03 PROCEDURE — 36415 COLL VENOUS BLD VENIPUNCTURE: CPT

## 2021-12-03 PROCEDURE — 94761 N-INVAS EAR/PLS OXIMETRY MLT: CPT

## 2021-12-03 RX ORDER — SODIUM CHLORIDE 9 MG/ML
25 INJECTION, SOLUTION INTRAVENOUS PRN
Status: DISCONTINUED | OUTPATIENT
Start: 2021-12-03 | End: 2021-12-08 | Stop reason: HOSPADM

## 2021-12-03 RX ORDER — ACETAMINOPHEN AND CODEINE PHOSPHATE 120; 12 MG/5ML; MG/5ML
1 SOLUTION ORAL DAILY
Status: ON HOLD | COMMUNITY
Start: 2021-12-01 | End: 2022-09-11 | Stop reason: HOSPADM

## 2021-12-03 RX ORDER — ONDANSETRON 4 MG/1
4 TABLET, ORALLY DISINTEGRATING ORAL EVERY 8 HOURS PRN
Status: DISCONTINUED | OUTPATIENT
Start: 2021-12-03 | End: 2021-12-08 | Stop reason: HOSPADM

## 2021-12-03 RX ORDER — SODIUM CHLORIDE 9 MG/ML
INJECTION, SOLUTION INTRAVENOUS CONTINUOUS
Status: DISCONTINUED | OUTPATIENT
Start: 2021-12-03 | End: 2021-12-08 | Stop reason: HOSPADM

## 2021-12-03 RX ORDER — MONTELUKAST SODIUM 10 MG/1
10 TABLET ORAL NIGHTLY
Status: DISCONTINUED | OUTPATIENT
Start: 2021-12-03 | End: 2021-12-08 | Stop reason: HOSPADM

## 2021-12-03 RX ORDER — ONDANSETRON 4 MG/1
4 TABLET, ORALLY DISINTEGRATING ORAL EVERY 8 HOURS PRN
Status: DISCONTINUED | OUTPATIENT
Start: 2021-12-03 | End: 2021-12-03

## 2021-12-03 RX ORDER — SODIUM CHLORIDE 0.9 % (FLUSH) 0.9 %
5-40 SYRINGE (ML) INJECTION PRN
Status: DISCONTINUED | OUTPATIENT
Start: 2021-12-03 | End: 2021-12-08 | Stop reason: HOSPADM

## 2021-12-03 RX ORDER — ACETAMINOPHEN 650 MG/1
650 SUPPOSITORY RECTAL EVERY 6 HOURS PRN
Status: DISCONTINUED | OUTPATIENT
Start: 2021-12-03 | End: 2021-12-03

## 2021-12-03 RX ORDER — ASPIRIN 81 MG/1
81 TABLET, CHEWABLE ORAL DAILY
Status: DISCONTINUED | OUTPATIENT
Start: 2021-12-03 | End: 2021-12-08 | Stop reason: HOSPADM

## 2021-12-03 RX ORDER — ALBUTEROL SULFATE 90 UG/1
2 AEROSOL, METERED RESPIRATORY (INHALATION) EVERY 6 HOURS PRN
Status: DISCONTINUED | OUTPATIENT
Start: 2021-12-03 | End: 2021-12-08 | Stop reason: HOSPADM

## 2021-12-03 RX ORDER — ONDANSETRON 2 MG/ML
4 INJECTION INTRAMUSCULAR; INTRAVENOUS ONCE
Status: COMPLETED | OUTPATIENT
Start: 2021-12-03 | End: 2021-12-03

## 2021-12-03 RX ORDER — ONDANSETRON 2 MG/ML
4 INJECTION INTRAMUSCULAR; INTRAVENOUS EVERY 6 HOURS PRN
Status: DISCONTINUED | OUTPATIENT
Start: 2021-12-03 | End: 2021-12-03 | Stop reason: SDUPTHER

## 2021-12-03 RX ORDER — SODIUM CHLORIDE 0.9 % (FLUSH) 0.9 %
5-40 SYRINGE (ML) INJECTION EVERY 12 HOURS SCHEDULED
Status: DISCONTINUED | OUTPATIENT
Start: 2021-12-03 | End: 2021-12-03 | Stop reason: SDUPTHER

## 2021-12-03 RX ORDER — DIPHENHYDRAMINE HYDROCHLORIDE 50 MG/ML
12.5 INJECTION INTRAMUSCULAR; INTRAVENOUS ONCE
Status: COMPLETED | OUTPATIENT
Start: 2021-12-03 | End: 2021-12-03

## 2021-12-03 RX ORDER — SODIUM CHLORIDE 0.9 % (FLUSH) 0.9 %
5-40 SYRINGE (ML) INJECTION PRN
Status: DISCONTINUED | OUTPATIENT
Start: 2021-12-03 | End: 2021-12-03 | Stop reason: SDUPTHER

## 2021-12-03 RX ORDER — LORAZEPAM 0.5 MG/1
0.5 TABLET ORAL
Status: COMPLETED | OUTPATIENT
Start: 2021-12-03 | End: 2021-12-03

## 2021-12-03 RX ORDER — BUDESONIDE AND FORMOTEROL FUMARATE DIHYDRATE 80; 4.5 UG/1; UG/1
2 AEROSOL RESPIRATORY (INHALATION) 2 TIMES DAILY
Status: DISCONTINUED | OUTPATIENT
Start: 2021-12-03 | End: 2021-12-08 | Stop reason: HOSPADM

## 2021-12-03 RX ORDER — ACETAMINOPHEN 325 MG/1
650 TABLET ORAL EVERY 6 HOURS PRN
Status: DISCONTINUED | OUTPATIENT
Start: 2021-12-03 | End: 2021-12-03

## 2021-12-03 RX ORDER — POTASSIUM CHLORIDE 20 MEQ/1
40 TABLET, EXTENDED RELEASE ORAL PRN
Status: DISCONTINUED | OUTPATIENT
Start: 2021-12-03 | End: 2021-12-08 | Stop reason: HOSPADM

## 2021-12-03 RX ORDER — FLUTICASONE PROPIONATE 50 MCG
1 SPRAY, SUSPENSION (ML) NASAL DAILY
Status: DISCONTINUED | OUTPATIENT
Start: 2021-12-03 | End: 2021-12-08 | Stop reason: HOSPADM

## 2021-12-03 RX ORDER — SODIUM CHLORIDE 0.9 % (FLUSH) 0.9 %
5-40 SYRINGE (ML) INJECTION EVERY 12 HOURS SCHEDULED
Status: DISCONTINUED | OUTPATIENT
Start: 2021-12-03 | End: 2021-12-08 | Stop reason: HOSPADM

## 2021-12-03 RX ORDER — POTASSIUM CHLORIDE 7.45 MG/ML
10 INJECTION INTRAVENOUS PRN
Status: DISCONTINUED | OUTPATIENT
Start: 2021-12-03 | End: 2021-12-08 | Stop reason: HOSPADM

## 2021-12-03 RX ORDER — POTASSIUM CHLORIDE 7.45 MG/ML
10 INJECTION INTRAVENOUS PRN
Status: DISCONTINUED | OUTPATIENT
Start: 2021-12-03 | End: 2021-12-03

## 2021-12-03 RX ORDER — MAGNESIUM SULFATE IN WATER 40 MG/ML
2000 INJECTION, SOLUTION INTRAVENOUS PRN
Status: DISCONTINUED | OUTPATIENT
Start: 2021-12-03 | End: 2021-12-08 | Stop reason: HOSPADM

## 2021-12-03 RX ORDER — TIZANIDINE 4 MG/1
4 TABLET ORAL 3 TIMES DAILY
COMMUNITY
Start: 2021-12-01 | End: 2022-01-26

## 2021-12-03 RX ORDER — DIPHENHYDRAMINE HCL 25 MG
25 TABLET ORAL EVERY 8 HOURS PRN
Status: DISCONTINUED | OUTPATIENT
Start: 2021-12-03 | End: 2021-12-03

## 2021-12-03 RX ORDER — POLYETHYLENE GLYCOL 3350 17 G/17G
17 POWDER, FOR SOLUTION ORAL DAILY PRN
Status: DISCONTINUED | OUTPATIENT
Start: 2021-12-03 | End: 2021-12-08 | Stop reason: HOSPADM

## 2021-12-03 RX ADMIN — PROMETHAZINE HYDROCHLORIDE 25 MG: 25 INJECTION INTRAMUSCULAR; INTRAVENOUS at 21:19

## 2021-12-03 RX ADMIN — ONDANSETRON 4 MG: 2 INJECTION INTRAMUSCULAR; INTRAVENOUS at 08:35

## 2021-12-03 RX ADMIN — DIPHENHYDRAMINE HYDROCHLORIDE 12.5 MG: 50 INJECTION, SOLUTION INTRAMUSCULAR; INTRAVENOUS at 21:19

## 2021-12-03 RX ADMIN — LORAZEPAM 0.5 MG: 0.5 TABLET ORAL at 17:06

## 2021-12-03 RX ADMIN — SODIUM CHLORIDE, PRESERVATIVE FREE 10 ML: 5 INJECTION INTRAVENOUS at 08:36

## 2021-12-03 RX ADMIN — SODIUM CHLORIDE: 9 INJECTION, SOLUTION INTRAVENOUS at 06:42

## 2021-12-03 RX ADMIN — Medication 2 PUFF: at 21:05

## 2021-12-03 RX ADMIN — POTASSIUM CHLORIDE 40 MEQ: 1500 TABLET, EXTENDED RELEASE ORAL at 17:06

## 2021-12-03 RX ADMIN — ONDANSETRON 4 MG: 4 TABLET, ORALLY DISINTEGRATING ORAL at 17:21

## 2021-12-03 RX ADMIN — ASPIRIN 81 MG: 81 TABLET, CHEWABLE ORAL at 17:06

## 2021-12-03 RX ADMIN — ONDANSETRON 4 MG: 2 INJECTION INTRAMUSCULAR; INTRAVENOUS at 00:30

## 2021-12-03 RX ADMIN — ACETYLCYSTEINE 4280 MG: 200 INJECTION, SOLUTION INTRAVENOUS at 00:53

## 2021-12-03 RX ADMIN — ACETYLCYSTEINE 8540 MG: 200 INJECTION, SOLUTION INTRAVENOUS at 17:13

## 2021-12-03 RX ADMIN — ENOXAPARIN SODIUM 40 MG: 100 INJECTION SUBCUTANEOUS at 08:35

## 2021-12-03 ASSESSMENT — PAIN DESCRIPTION - ORIENTATION: ORIENTATION: RIGHT;MID;LOWER

## 2021-12-03 ASSESSMENT — PAIN DESCRIPTION - PAIN TYPE: TYPE: ACUTE PAIN

## 2021-12-03 ASSESSMENT — PAIN - FUNCTIONAL ASSESSMENT: PAIN_FUNCTIONAL_ASSESSMENT: PREVENTS OR INTERFERES SOME ACTIVE ACTIVITIES AND ADLS

## 2021-12-03 ASSESSMENT — PAIN DESCRIPTION - FREQUENCY: FREQUENCY: INTERMITTENT

## 2021-12-03 ASSESSMENT — PAIN DESCRIPTION - LOCATION: LOCATION: ABDOMEN

## 2021-12-03 ASSESSMENT — PAIN DESCRIPTION - DESCRIPTORS: DESCRIPTORS: SHARP

## 2021-12-03 ASSESSMENT — PAIN DESCRIPTION - ONSET: ONSET: SUDDEN

## 2021-12-03 ASSESSMENT — PAIN SCALES - GENERAL
PAINLEVEL_OUTOF10: 5
PAINLEVEL_OUTOF10: 5

## 2021-12-03 ASSESSMENT — PAIN DESCRIPTION - PROGRESSION: CLINICAL_PROGRESSION: NOT CHANGED

## 2021-12-03 NOTE — PLAN OF CARE
Problem: Pain:  Goal: Pain level will decrease  Description: Pain level will decrease  Outcome: Ongoing  Note: Pt assessed for pain. Pt in pain and assessed with 0-10 pain rating scale. Pt given prescribed analgesic for pain. (See eMar) Pt satisfied with pain relief thus far. Will reassess and continue to monitor. Goal: Control of acute pain  Description: Control of acute pain  Outcome: Ongoing  Note: Pt assessed for pain. Pt in pain and assessed with 0-10 pain rating scale. Pt given prescribed analgesic for pain. (See eMar) Pt satisfied with pain relief thus far. Will reassess and continue to monitor. Goal: Control of chronic pain  Description: Control of chronic pain  Outcome: Ongoing  Note: Pt assessed for pain. Pt in pain and assessed with 0-10 pain rating scale. Pt given prescribed analgesic for pain. (See eMar) Pt satisfied with pain relief thus far. Will reassess and continue to monitor. Goal: Patient's pain/discomfort is manageable  Description: Patient's pain/discomfort is manageable  Outcome: Ongoing  Note: Patients pain/discomfort is manageable. Will monitor and assess. Problem: Infection:  Goal: Will remain free from infection  Description: Will remain free from infection  Outcome: Ongoing  Note: Pt is free of signs and symptoms of infection. Vital signs stable. Will monitor. Problem: Safety:  Goal: Free from accidental physical injury  Description: Free from accidental physical injury  Outcome: Ongoing  Note: Pt is free of injury. No injury noted. Fall precautions in place. Call light within reach. Will monitor. Goal: Free from intentional harm  Description: Free from intentional harm  Outcome: Ongoing  Note: Patient will remains free from intentional harm. Will monitor and assess. Problem: Daily Care:  Goal: Daily care needs are met  Description: Daily care needs are met  Outcome: Ongoing  Note: Daily care needs have been met by staff and patient.  Will continue to monitor needs.     Problem: Skin Integrity:  Goal: Skin integrity will stabilize  Description: Skin integrity will stabilize  Outcome: Ongoing  Note: Skin integrity will stabilize. Will monitor and assess. Problem: Discharge Planning:  Goal: Patients continuum of care needs are met  Description: Patients continuum of care needs are met  Outcome: Ongoing  Note: Patients continuum of care needs are met. Will monitor and assess.

## 2021-12-03 NOTE — PROGRESS NOTES
Patient is resting in bed and is alert and oriented x4. Patient complaining of right mid to lower quadrant pain. Patient still complaining of nausea. PRN medication given per MD orders (see eMAR). Patient gets up as a stand by assist. Patient to many procedural areas today to get CT of head, US of abdomen, and ECHO. Patient did not eat much of her breakfast this morning. Morning medication given and head to toe assessment is complete. All needs are met and safety precautions are in place. Will continue to monitor and assess.   Electronically signed by Gabbi Pabon RN on 12/3/2021 at 10:11 AM.

## 2021-12-03 NOTE — PROGRESS NOTES
Physician Progress Note      Tone Lim  CSN #:                  [id number]  :                       1995  ADMIT DATE:       2021 5:16 PM  [address] DATE:  RESPONDING  PROVIDER #:        David Larson MD          QUERY TEXT:    Dear Dr. Freddie Maya,  Patient admitted with Abd pain, N and V and elevated liver   enzymes,Hyperbilirubinemia noted to have taken  Tylenol 1500 mg/day and use of   herbal supplement Kratom for past few weeks. If possible, please document in   progress notes and discharge summary if you are evaluating and/or treating any   of the following: The medical record reflects the following:  Risk Factors: Hx endometriosis, takes 1500 mg Tylenol per day, report to using   kratom, herbal supplement for the past few weeks  Clinical Indicators: 12/2ED for Abd pain RLQ and emesis x 6 days,   lightheadedness and syncope episodes, Total Bili=2.4, Alk Phos=165, ALT=455,   IUO=599, ED notes \"does report to using kratom, herbal supplement for the past   few weeks. Says last use was Friday. Given this, I did consult poison   control (10pm). There are case reports of intrahepatic cholestasis after   ingestion of kratom. There is no known antidote or treatment. However, for   the repeated misuse of acetaminophen over the past few days, poison control   does recommend doing 12 hours of NAC and repeating LFTs. \"  Treatment:  N-acetylcysteine, monitor labs, Poison control called  Thank you,  Mayra Oreilly RN, [email or web address]  Options provided:  -- Accidental overdose of Tylenol  -- Intentional overdose of Tylenol  -- Adverse effect of Tylenol, properly administered  -- Accidental overdose of Kratom  -- Intentional overdose of Kratom  -- Adverse effect of Kratom, properly administered  -- Accidental overdose of Kratom and Tylenol  -- Intentional overdose of Kratom and Tylenol  -- Adverse effect of Kratom and Tylenol, properly administered  -- Other - I will add my own diagnosis  -- Disagree - Not applicable / Not valid  -- Disagree - Clinically unable to determine / Unknown  -- Refer to Clinical Documentation Reviewer    PROVIDER RESPONSE TEXT:    This patient had an accidental overdose of Tylenol. Query created by: [facility] on 12/3/2021 11:48 AM      QUERY TEXT:    Dear Dr. Zoie Patricia,  Patient admitted with Nausea, emesis and abdominal pain. Noted documentation   of Hyperbilirubinemia. If possible, please document in progress notes and   discharge summary if you are evaluating and/or treating any of the following: The medical record reflects the following:  Risk Factors: Hx endometriosis, Reports taking Tylenol upwards of 1500mg in a   24 period daily, reports use of  Kratom 2 days ago to see if it would help abd   pain  Clinical Indicators: ED for RLQ abd pain, nausea, emesis, weakness, decreased   appetite, syncopal episodes, Total Bili=2.4, Alk Phos=165, ALT=455, GVJ=443,   (Denies ETOH use, Hep neg), ED notes \"report to using kratom, herbal   supplement for the past few weeks. Says last use was Friday. Given this, I   did consult poison control (10pm). There are case reports of intrahepatic   cholestasis after ingestion of kratom. There is no known antidote or   treatment. However, for the repeated misuse of acetaminophen over the past   few days, poison control does recommend doing 12 hours of N  Treatment: N-acetylcysteine, monitor labs, consider GI consult  Thank you,  Bebo Bourgeois RN, [email or web address]  Options provided:  -- Acute Liver failure due to Tylenol  -- Acute Liver failure due to Kratom  -- Acute Liver failure due to Tylenol and Kratom  -- Acute Liver failure unknown etiology  -- Other - I will add my own diagnosis  -- Disagree - Not applicable / Not valid  -- Disagree - Clinically unable to determine / Unknown  -- Refer to Clinical Documentation Reviewer    PROVIDER RESPONSE TEXT:    This patient has Acute liver failure due to Tylenol. Query created by:  [id number] on 12/3/2021 11:48 AM      Electronically signed by:  Shyam Corea MD 12/3/2021 1:32 PM

## 2021-12-03 NOTE — PLAN OF CARE
Problem: Falls - Risk of:  Goal: Will remain free from falls  Description: Will remain free from falls  Outcome: Ongoing  Note: Fall risk assessment completed. Fall precautions in place. Call light within reach. Pt educated on calling for assistance before getting up. Walkway free of clutter. Will continue to monitor. Problem: Falls - Risk of:  Goal: Absence of physical injury  Description: Absence of physical injury  Outcome: Ongoing  Note: Patient will remain free from physical injury. Safety precautions are in place. Will continue to monitor and assess.

## 2021-12-03 NOTE — H&P
Hospital Medicine History & Physical      PCP: No primary care provider on file. Date of Admission: 12/2/2021    Date of Service: Pt seen/examined on 12/3/2021 and Admitted to Inpatient     Chief Complaint: Intractable nausea and vomiting, headache, 2 syncopal episodes in the past 48 hours      History Of Present Illness: The patient is a 27 y.o. female who presents to [facility] with main concern of second episode of syncope in the past 2 days without any major trigger. For the past 3 days patient has felt unwell but initially started as just generalized weakness. She then started having decreased appetite and recurrent episodes of nausea and nonbloody although bilous vomitng and both syncope episodes were related to her vomiting and feeling lightheaded. She has not eaten much over the last 2 days. She denies to me taking excessive tylenol but notes her last use was almost a week ago. She does admit in a typical week she may use upwards of 1500mg in a 24 period daily but mostly to help her sleep at night. She denies any previous abdominal issues or liver issues. She did use Kratom 2 days ago to see if it would help some lower abdominal pain that she seems to chronically have- the pain is of uncertain etiology but has not been confirmed to be entirely of endometriosis nature. She denies other symptoms of dysuria, seizure disorder, b/b incontinence, vision change, focal weakness, dysuria, blood in urine/ stool/sputum/vomit, cough, congestion, sick contacts, SOB, CP, fever, chills. Past Medical History:    History reviewed. No pertinent past medical history. Past Surgical History:        Procedure Laterality Date    LAPAROSCOPY         Medications Prior to Admission:    Prior to Admission medications    Medication Sig Start Date End Date Taking?  Authorizing Provider   montelukast (SINGULAIR) 10 MG tablet Take 1 tablet by mouth nightly 9/18/19   Fela Sosa, APRN - CNP   fluticasone [facility] ALLERGY RELIEF) 50 MCG/ACT nasal spray 1 spray by Each Nostril route daily    Historical Provider, MD   diphenhydrAMINE HCl (BENADRYL ALLERGY PO) Take by mouth    Historical Provider, MD   albuterol sulfate  (90 Base) MCG/ACT inhaler Inhale 2 puffs into the lungs every 6 hours as needed for Wheezing    Historical Provider, MD   Fluticasone Furoate-Vilanterol (BREO ELLIPTA) 200-25 MCG/INH AEPB Inhale 1 puff into the lungs daily 7/24/19   Mirella Peters MD       Allergies:  Shrimp flavor    Social History:  The patient currently lives home    TOBACCO:   reports that she has never smoked. She has never used smokeless tobacco.  ETOH:   has no history on file for alcohol use. Family History:  Reviewed in detail and negative for DM, Early CAD, Cancer, CVA. Positive as follows:    History reviewed. No pertinent family history. REVIEW OF SYSTEMS:   as noted in the HPI. All other systems reviewed and negative. PHYSICAL EXAM:    /66   Pulse 69   Temp 98.1 °F (36.7 °C) (Oral)   Resp 19   Ht 5' 5\" (1.651 m)   Wt 188 lb 4.4 oz (85.4 kg)   LMP 11/11/2021   SpO2 97%   BMI 31.33 kg/m²     General appearance: No apparent distress appears stated age and cooperative. Alert and oriented x4, no acute respiratory stress, currently not vomiting  HEENT Normal cephalic, atraumatic without obvious deformity. Pupils equal, round, and reactive to light. Extra ocular muscles intact. Conjunctivae/corneas clear. Anicteric sclera  Neck: Supple, no JVD  Lungs: Clear to auscultation, bilaterally without Rales/Wheezes/Rhonchi with good respiratory effort.   Heart: Regular rate and rhythm with Normal S1/S2 without murmurs, rubs or gallops, point of maximum impulse non-displaced  Abdomen: Soft, nontender, nondistended, active bowel sounds, negative Malaika Spark sign  Extremities: No clubbing, cyanosis, or edema bilaterally. Full range of motion without deformity and normal gait intact. Skin: Skin color, texture, turgor normal.  No rashes or lesions. Neurologic: Alert and oriented X 3, neurovascularly intact with sensory/motor intact upper extremities/lower extremities, bilaterally. Cranial nerves: II-XII intact, grossly non-focal.  Negative for asterixis, pronator drift negative  Mental status: Alert, oriented, thought content appropriate. Capillary Refill: Acceptable  < 3 seconds  Peripheral Pulses: +3 Easily felt, not easily obliterated with pressure      Chest x-ray: No acute process  CT abdomen and pelvis with IV contrast:      FINDINGS:   Lower Chest: No focal consolidation.  Trace effusions.       Organs:       Liver: Unremarkable on this phase of enhancement.       Spleen: Unremarkable on this phase of enhancement.       Pancreas: Unremarkable on this phase of enhancement.       Adrenal glands: Unremarkable on this phase of enhancement.       Kidneys: Unremarkable on this phase of enhancement. No renal, ureteral, or   bladder calculi.       Gallbladder: Incompletely distended.       GI/Bowel:       Evaluation of the bowel and mesentery is limited due to lack of enteric   contrast.       Visualized esophagus/stomach: Questionable wall thickening of the gastric   antrum with suboptimal evaluation due to lack of enteric contrast and   incomplete distention.       Small bowel: No dilated small bowel.       Large bowel: Scattered colonic diverticula without adjacent stranding.       Appendix: Normal.       Pelvis: Bladder is incompletely distended. Uterus and ovaries appear   unremarkable by CT.       Peritoneum/Retroperitoneum:       Adenopathy: Suboptimal evaluation for adenopathy due to lack of enteric   contrast.       Abdominal aorta: No abdominal aortic aneurysm.       Free fluid/air: No free fluid. No free air.       Bones/Soft Tissues:  Body wall appears unremarkable.           Impression   1. Questionable wall thickening of the gastric antrum given suboptimal   evaluation as described.  Correlate with presentation to exclude symptoms of   gastritis. 2. No appendicitis, diverticulitis, or small bowel obstruction. 3. Other findings as described. CBC   Recent Labs     12/02/21 1813   WBC 6.7   HGB 14.6   HCT 44.0         RENAL  Recent Labs     12/02/21  1813 12/03/21  0352    140   K 4.2 3.7   CL 99 101   CO2 26 21   BUN 9 8   CREATININE 0.8 0.6     LFT'S  Recent Labs     12/02/21  1813 12/03/21  0352   * 159*   * 479*   BILITOT 2.4* 2.5*   ALKPHOS 165* 146*     COAG  Recent Labs     12/02/21 2145   INR 0.99     CARDIAC ENZYMES  Recent Labs     12/02/21 1813 12/02/21 2145   TROPONINI <0.01 <0.01       U/A:    Lab Results   Component Value Date    COLORU YELLOW 12/02/2021    CLARITYU Clear 12/02/2021    SPECGRAV 1.006 12/02/2021    LEUKOCYTESUR Negative 12/02/2021    BLOODU Negative 12/02/2021    GLUCOSEU Negative 12/02/2021       ABG  No results found for: AXI6YZM, BEART, B9DMVPRB, PHART, THGBART, MTT1NKQ, PO2ART, Jarod Campersingel 50 Problems    Diagnosis Date Noted    Hyperbilirubinemia [E80.6] 12/03/2021    Elevated LFTs [R79.89] 12/03/2021    Intractable nausea and vomiting [R11.2] 12/03/2021    Syncope [R55] 12/03/2021    Headache [R51.9] 12/03/2021    Syncope and collapse [R55] 12/03/2021         PHYSICIANS CERTIFICATION:    I certify that Louis Mcclellan is expected to be hospitalized for greater than 2 midnights based on the following assessment and plan:      ASSESSMENT/PLAN:  · Syncope  · Headache  · Intractable nausea and vomiting  · Elevated LFTs  · Hyperbilirubinemia    Plan:  · Patient denied any major recent Tylenol use and LFTs could be secondarily elevated to another process other than Tylenol use, Tylenol level was noted to be negative in the ED.   Kratom use could be the cause of the elevated LFTs  · Did continue N-acetylcysteine for now but stopped full N-acetylcysteine after bouts to 7 hours, can reevaluate repeat labs in the morning to see where liver functions are proceeding and then can consider further N-acetylcysteine later  · Headache is of uncertain etiology, syncope does seem to be more consistent with vasovagal syncope from vomiting due to the similar nature of the 2 episodes. · Pending CT head for evaluation of headache syndrome  · Obtaining ultrasound of the abdomen to include gallbladder and liver to evaluate further etiology of elevated LFTs, can consider MRCP later  · Hepatitis panel came back negative and alcohol level was negative as well  · If LFTs remain persistently elevated, may consider GI consult  · Repeat labs in the morning    DVT Prophylaxis: Lovenox  Diet: ADULT DIET; Clear Liquid  Code Status: Full Code  PT/OT Eval Status: Ambulatory    Dispo -pending clinical course       Gio Del Cid DO    Thank you No primary care provider on file. for the opportunity to be involved in this patient's care. If you have any questions or concerns please feel free to contact me at [phone].

## 2021-12-03 NOTE — ED PROVIDER NOTES
Summary, patient is a 68-year-old female presents the emergency department for evaluation of of 2 syncopal episodes, right upper quadrant abdominal pain, nausea, vomiting, chest tightness, and shortness of breath a few days. Patient care handed off to me pending CT scan. CMP shows ALT of 455, , total bilirubin of 2.4, elevated alk phos of 165. Lipase is normal.  CT shows questionable wall thickening of the gastric antrum. No acute appendicitis noted. On my assessment, patient was resting comfortably on the stretcher. She does have soft, nondistended abdomen with mild tenderness to palpation over the right upper quadrant and epigastrium. Patient tells me that she has history of endometriosis and has been taking 1500mg tylenol before going to bed at night. Denies ever taking more than 1500 mg Tylenol per day. Says her abdominal pain is more upper which is not typical over endometriosis pain. Says she also has a lot of other symptoms such as nausea, multiple episodes of vomiting per day, chest tightness, shortness of breath, headache, and also reports she syncopized twice. She syncopized once 2 days ago and syncopized once yesterday. Says she is unsure if she hit her head when she passed out. Denies any alcohol use. Denies any IV drug use. No recent travel outside the country. No hiking or drinking water outside. She does report to using kratom, herbal supplement for the past few weeks. Says last use was Friday. Given this, I did consult poison control (10pm). There are case reports of intrahepatic cholestasis after ingestion of kratom. There is no known antidote or treatment. However, for the repeated misuse of acetaminophen over the past few days, poison control does recommend doing 12 hours of NAC and repeating LFTs. These were ordered. As patient reports having chest tightness and syncopal episodes, D-dimer added on and is less than 200.   Patient at low risk for PE and does not require further imaging at this time. I was unable to add on a CT head because she had already received CT abdomen pelvis with contrast.  However, I did relay the headache to the hospitalist.  [address] [place] [address] provided at least 31 minutes of critical care excluding separately billable procedures.         Lucy Jiménez MD  12/02/21 9412

## 2021-12-03 NOTE — PROGRESS NOTES
Patient is very nauseous and is dry heaving and  unable to get anything up. Patient states she feels more nauseous after eating. Will continue to monitor and assess.   Electronically signed by Lisbeth Moreira RN on 12/3/2021 at 5:23 PM

## 2021-12-03 NOTE — CARE COORDINATION
Chart Reviewed. Met with patient and spouse at bedside to assess dc needs. She reports she does not have a pcp and would appreciate having an appt made for her on the [facility] side of [place]. She reports she does not drive. She is open to any day or time. Called [phone] to schedule:  Dr Hussein Baird  Monday, 12-    9:00 am  Irena [phone].   Abi Roberts, [place]     [facility]   [phone]    12/3/2021  4:27 PM

## 2021-12-03 NOTE — PROGRESS NOTES
Hospitalist Progress Note      PCP: No primary care provider on file. Date of Admission: 12/2/2021    Chief Complaint:  N/V    Hospital Course: The patient is a 27 y.o. female who presents to [facility] with main concern of second episode of syncope in the past 2 days without any major trigger. For the past 3 days patient has felt unwell but initially started as just generalized weakness. She has had decreased appetite, nausea and nonbloody oft bilous vomitng. The 2 syncopal episodes were  related to her vomiting and feeling lightheaded. She has not eaten much over the last 2 days. She on further questioning alludes to taking a staggered above normal amount of tylenol over the last few days above 1500mg in a 24 period daily but mostly to help her sleep at night. She denies any previous abdominal issues or liver issues. No h/o BTx, excess alcohol, IVDU, jaundice. She did use Kratom 2 days ago to see if it would help some lower abdominal pain that she seems to chronically have- the pain is of uncertain etiology but has not been confirmed to be entirely of endometriosis nature. She denies other symptoms of dysuria, seizure disorder, b/b incontinence, vision change, focal weakness, dysuria, blood in urine/ stool/sputum/vomit, cough, congestion, sick contacts, SOB, CP, fever, chills. Her tylenol level was <5 but had abn LFTs and poisons board advised to continue NAC till AST/ALT fall <half and INR<1.5 and pt feels well. Subjective:   Feeling better  No vomiting but naiseated.   No diarrhoea       Medications:  Reviewed    Infusion Medications    sodium chloride 125 mL/hr at 12/03/21 7966    sodium chloride      sodium chloride       Scheduled Medications    enoxaparin  40 mg SubCUTAneous Daily    fluticasone  1 spray Each Nostril Daily    budesonide-formoterol  2 puff Inhalation BID    montelukast  10 mg Oral Nightly    sodium chloride flush  5-40 mL IntraVENous 2 times per day    aspirin 81 mg Oral Daily     PRN Meds: sodium chloride, diphenhydrAMINE, albuterol sulfate HFA, sodium chloride flush, sodium chloride, ondansetron **OR** [DISCONTINUED] ondansetron, polyethylene glycol, ondansetron **OR** [DISCONTINUED] ondansetron, potassium chloride, magnesium sulfate      Intake/Output Summary (Last 24 hours) at 12/3/2021 1512  Last data filed at 12/3/2021 1234  Gross per 24 hour   Intake 150 ml   Output --   Net 150 ml       Physical Exam Performed:    BP (!) 163/96   Pulse 95   Temp 98.4 °F (36.9 °C) (Oral)   Resp 18   Ht 5' 5\" (1.651 m)   Wt 188 lb 4.4 oz (85.4 kg)   LMP 11/11/2021   SpO2 93%   BMI 31.33 kg/m²     General appearance: No apparent distress, appears stated age and cooperative. HEENT: Pupils equal, round, and reactive to light. Conjunctivae/corneas clear. Neck: Supple, with full range of motion. No jugular venous distention. Trachea midline. Respiratory:  Normal respiratory effort. Clear to auscultation, bilaterally without Rales/Wheezes/Rhonchi. Cardiovascular: Regular rate and rhythm with normal S1/S2 without murmurs, rubs or gallops. Abdomen: Soft, non-tender, non-distended with normal bowel sounds. Musculoskeletal: No clubbing, cyanosis or edema bilaterally. Full range of motion without deformity. Skin: Skin color, texture, turgor normal.  No rashes or lesions. Neurologic:  Neurovascularly intact without any focal sensory/motor deficits.  Cranial nerves: II-XII intact, grossly non-focal.  Psychiatric: Alert and oriented, thought content appropriate, normal insight  Capillary Refill: Brisk,3 seconds, normal   Peripheral Pulses: +2 palpable, equal bilaterally       Labs:   Recent Labs     12/02/21  1813 12/03/21  1106   WBC 6.7 10.2   HGB 14.6 14.3   HCT 44.0 43.6    292     Recent Labs     12/02/21  1813 12/03/21  0352 12/03/21  1106    140 136   K 4.2 3.7 3.4*   CL 99 101 101   CO2 26 21 20*   BUN 9 8 7   CREATININE 0.8 0.6 0.6   CALCIUM 9.4 8.9 8.9

## 2021-12-04 LAB
A/G RATIO: 1.6 (ref 1.1–2.2)
ALBUMIN SERPL-MCNC: 4 G/DL (ref 3.4–5)
ALP BLD-CCNC: 147 U/L (ref 40–129)
ALT SERPL-CCNC: 510 U/L (ref 10–40)
ANION GAP SERPL CALCULATED.3IONS-SCNC: 10 MMOL/L (ref 3–16)
AST SERPL-CCNC: 225 U/L (ref 15–37)
BASOPHILS ABSOLUTE: 0.1 K/UL (ref 0–0.2)
BASOPHILS RELATIVE PERCENT: 1.2 %
BILIRUB SERPL-MCNC: 2.1 MG/DL (ref 0–1)
BUN BLDV-MCNC: 7 MG/DL (ref 7–20)
CALCIUM SERPL-MCNC: 9.2 MG/DL (ref 8.3–10.6)
CHLORIDE BLD-SCNC: 104 MMOL/L (ref 99–110)
CO2: 25 MMOL/L (ref 21–32)
CREAT SERPL-MCNC: 0.8 MG/DL (ref 0.6–1.1)
EOSINOPHILS ABSOLUTE: 0.3 K/UL (ref 0–0.6)
EOSINOPHILS RELATIVE PERCENT: 5.1 %
GFR AFRICAN AMERICAN: >60
GFR NON-AFRICAN AMERICAN: >60
GLUCOSE BLD-MCNC: 94 MG/DL (ref 70–99)
HAV IGM SER IA-ACNC: NORMAL
HCT VFR BLD CALC: 41.8 % (ref 36–48)
HEMOGLOBIN: 13.8 G/DL (ref 12–16)
HEPATITIS B CORE IGM ANTIBODY: NORMAL
HEPATITIS B SURFACE ANTIGEN INTERPRETATION: NORMAL
HEPATITIS C ANTIBODY INTERPRETATION: NORMAL
INR BLD: 0.99 (ref 0.88–1.12)
LYMPHOCYTES ABSOLUTE: 2.1 K/UL (ref 1–5.1)
LYMPHOCYTES RELATIVE PERCENT: 37.1 %
MAGNESIUM: 2 MG/DL (ref 1.8–2.4)
MCH RBC QN AUTO: 30.2 PG (ref 26–34)
MCHC RBC AUTO-ENTMCNC: 33.1 G/DL (ref 31–36)
MCV RBC AUTO: 91.3 FL (ref 80–100)
MONOCYTES ABSOLUTE: 0.6 K/UL (ref 0–1.3)
MONOCYTES RELATIVE PERCENT: 10.8 %
NEUTROPHILS ABSOLUTE: 2.6 K/UL (ref 1.7–7.7)
NEUTROPHILS RELATIVE PERCENT: 45.8 %
PDW BLD-RTO: 12.8 % (ref 12.4–15.4)
PLATELET # BLD: 284 K/UL (ref 135–450)
PMV BLD AUTO: 7.1 FL (ref 5–10.5)
POTASSIUM SERPL-SCNC: 4 MMOL/L (ref 3.5–5.1)
PROTHROMBIN TIME: 11.2 SEC (ref 9.9–12.7)
RBC # BLD: 4.58 M/UL (ref 4–5.2)
SODIUM BLD-SCNC: 139 MMOL/L (ref 136–145)
TOTAL PROTEIN: 6.5 G/DL (ref 6.4–8.2)
WBC # BLD: 5.7 K/UL (ref 4–11)

## 2021-12-04 PROCEDURE — 6370000000 HC RX 637 (ALT 250 FOR IP): Performed by: INTERNAL MEDICINE

## 2021-12-04 PROCEDURE — 80053 COMPREHEN METABOLIC PANEL: CPT

## 2021-12-04 PROCEDURE — 6360000002 HC RX W HCPCS: Performed by: INTERNAL MEDICINE

## 2021-12-04 PROCEDURE — 83735 ASSAY OF MAGNESIUM: CPT

## 2021-12-04 PROCEDURE — 85025 COMPLETE CBC W/AUTO DIFF WBC: CPT

## 2021-12-04 PROCEDURE — 2580000003 HC RX 258: Performed by: NURSE PRACTITIONER

## 2021-12-04 PROCEDURE — 80074 ACUTE HEPATITIS PANEL: CPT

## 2021-12-04 PROCEDURE — 2580000003 HC RX 258: Performed by: INTERNAL MEDICINE

## 2021-12-04 PROCEDURE — 6360000002 HC RX W HCPCS: Performed by: NURSE PRACTITIONER

## 2021-12-04 PROCEDURE — 94760 N-INVAS EAR/PLS OXIMETRY 1: CPT

## 2021-12-04 PROCEDURE — 86038 ANTINUCLEAR ANTIBODIES: CPT

## 2021-12-04 PROCEDURE — 94640 AIRWAY INHALATION TREATMENT: CPT

## 2021-12-04 PROCEDURE — 85610 PROTHROMBIN TIME: CPT

## 2021-12-04 PROCEDURE — 1200000000 HC SEMI PRIVATE

## 2021-12-04 PROCEDURE — 36415 COLL VENOUS BLD VENIPUNCTURE: CPT

## 2021-12-04 RX ADMIN — SODIUM CHLORIDE, PRESERVATIVE FREE 10 ML: 5 INJECTION INTRAVENOUS at 08:30

## 2021-12-04 RX ADMIN — MONTELUKAST 10 MG: 10 TABLET, FILM COATED ORAL at 21:09

## 2021-12-04 RX ADMIN — PROMETHAZINE HYDROCHLORIDE 25 MG: 25 INJECTION INTRAMUSCULAR; INTRAVENOUS at 04:39

## 2021-12-04 RX ADMIN — Medication 2 PUFF: at 21:32

## 2021-12-04 RX ADMIN — ACETYLCYSTEINE 8540 MG: 200 INJECTION, SOLUTION INTRAVENOUS at 11:33

## 2021-12-04 RX ADMIN — ASPIRIN 81 MG: 81 TABLET, CHEWABLE ORAL at 08:28

## 2021-12-04 RX ADMIN — Medication 2 PUFF: at 08:48

## 2021-12-04 RX ADMIN — ENOXAPARIN SODIUM 40 MG: 100 INJECTION SUBCUTANEOUS at 08:28

## 2021-12-04 ASSESSMENT — PAIN SCALES - GENERAL: PAINLEVEL_OUTOF10: 0

## 2021-12-04 NOTE — PLAN OF CARE
Problem: Pain:  Goal: Pain level will decrease  Description: Pain level will decrease  12/4/2021 1314 by John Wesley RN  Outcome: Ongoing  Note: Pt assessed for pain. Pt denies any pain at this time. Will continue to monitor pt and assess for pain throughout rest of shift. 12/4/2021 0121 by Tamera Bianchi RN  Outcome: Ongoing  Note: Pt pain level will decrease. Goal: Control of acute pain  Description: Control of acute pain  12/4/2021 1314 by John Wesley RN  Outcome: Ongoing  Note: Pt assessed for pain. Pt denies any pain at this time. Will continue to monitor pt and assess for pain throughout rest of shift. 12/4/2021 0121 by aTmera Bianchi RN  Outcome: Ongoing  Note: Pt will have control of acute pain. Goal: Control of chronic pain  Description: Control of chronic pain  12/4/2021 1314 by John Wesley RN  Outcome: Ongoing  Note: Pt assessed for pain. Pt denies any pain at this time. Will continue to monitor pt and assess for pain throughout rest of shift. 12/4/2021 0121 by Tamera Bianchi RN  Outcome: Ongoing  Note: Pt will have control of chronic pain   Goal: Patient's pain/discomfort is manageable  Description: Patient's pain/discomfort is manageable  12/4/2021 1314 by John Wesley RN  Outcome: Ongoing  Note: Pt assessed for pain. Pt denies any pain at this time. Will continue to monitor pt and assess for pain throughout rest of shift. 12/4/2021 0121 by Tamera Bianchi RN  Outcome: Ongoing  Note: Pt pain will be manageable. Problem: Infection:  Goal: Will remain free from infection  Description: Will remain free from infection  12/4/2021 1314 by John Wesley RN  Outcome: Ongoing  Note: Pt remains free of infection. Will monitor patient, labs and vitals.    12/4/2021 0121 by Tamera Bianchi RN  Outcome: Ongoing  Note: Pt will remain free from infection      Problem: Safety:  Goal: Free from accidental physical injury  Description: Free from accidental physical injury  12/4/2021 1314 by Linda Hernandez ADEEL Mendez  Outcome: Ongoing  Note: Pt is free of injury. No injury noted. Fall precautions in place. Call light within reach. Will monitor. 12/4/2021 0121 by Scott Hoover RN  Outcome: Ongoing  Note: Pt will be free from accidental injury. Goal: Free from intentional harm  Description: Free from intentional harm  12/4/2021 1314 by Víctor Nolasco RN  Outcome: Ongoing  Note: Pt is free of intentional harm. No intentions noted. Will monitor. 12/4/2021 0121 by Scott Hoover RN  Outcome: Ongoing  Note: Pt will be free from intentional harm. Problem: Daily Care:  Goal: Daily care needs are met  Description: Daily care needs are met  12/4/2021 1314 by Víctor Nolasco RN  Outcome: Ongoing  Note: Pt daily care needs are being met by patient and staff. Will monitor. 12/4/2021 0121 by Scott Hoover RN  Outcome: Ongoing  Note: Pt daily care needs will be met     Problem: Skin Integrity:  Goal: Skin integrity will stabilize  Description: Skin integrity will stabilize  12/4/2021 1314 by Víctor Nolasco RN  Outcome: Ongoing  Note: Pt shows no new signs of skin integrity. Will monitor and encourage patient to turn and reposition every two hours. 12/4/2021 0121 by Scott Hoover RN  Outcome: Ongoing  Note: Pt skin integrity will be stabilized. Problem: Discharge Planning:  Goal: Patients continuum of care needs are met  Description: Patients continuum of care needs are met  12/4/2021 1314 by Víctor Nolasco RN  Outcome: Ongoing  Note: Pt LAZARUS needs are being met. SW and medical team are following. 12/4/2021 0121 by Scott Hoover RN  Outcome: Ongoing  Note: Pt continuum of care needs will be met. Problem: Falls - Risk of:  Goal: Will remain free from falls  Description: Will remain free from falls  12/4/2021 1314 by Víctor Nolasco RN  Outcome: Ongoing  Note: Fall risk assessment completed. Fall precautions in place. Call light within reach. Pt educated on calling for assistance before getting up.  Walkway free of clutter. Will continue to monitor. 12/4/2021 0121 by Blake Aburto RN  Outcome: Ongoing  Note: Pt will remain free from falls. Goal: Absence of physical injury  Description: Absence of physical injury  12/4/2021 1314 by Ran Sevilla RN  Outcome: Ongoing  Note: Pt is free of injury. No injury noted. Fall precautions in place. Call light within reach. Will monitor.     12/4/2021 0121 by Blake Aburto RN  Outcome: Ongoing  Note: Pt will have absence of physical injury

## 2021-12-04 NOTE — PROGRESS NOTES
Pt A&O in the bed. Pt tolerating PO intake well. AM medications given without complications. Pt has no complaints of nausea, vomiting or pain at this time. Call light within reach. Able to make needs known. Fall precautions in place. Will monitor.  Electronically signed by Faraz Carmen RN on 12/4/2021 at 1:12 PM

## 2021-12-04 NOTE — PROGRESS NOTES
Patient complaining of nausea/vomiting without relief from PO medications given by day shift RN. Perfect serve sent to Nghia Kasper NP about IV antiemetics.

## 2021-12-04 NOTE — PROGRESS NOTES
Pt awake this am orthostatic completed. No significant change. Pt had some nausea and phenergan was hung. Call light within reach will continue to monitor.

## 2021-12-04 NOTE — PROGRESS NOTES
Hospitalist Progress Note      PCP: No primary care provider on file. Date of Admission: 12/2/2021    Chief Complaint:  N/V    Hospital Course: The patient is a 27 y.o. female who presents to [facility] with main concern of second episode of syncope in the past 2 days without any major trigger. For the past 3 days patient has felt unwell but initially started as just generalized weakness. She has had decreased appetite, nausea and nonbloody oft bilous vomitng. The 2 syncopal episodes were  related to her vomiting and feeling lightheaded. She has not eaten much over the last 2 days. She on further questioning alludes to taking a staggered above normal amount of tylenol over the last few days above 1500mg in a 24 period daily but mostly to help her sleep at night. She denies any previous abdominal issues or liver issues. No h/o BTx, excess alcohol, IVDU, jaundice. She did use Kratom 2 days ago to see if it would help some lower abdominal pain that she seems to chronically have- the pain is of uncertain etiology but has not been confirmed to be entirely of endometriosis nature. She denies other symptoms of dysuria, seizure disorder, b/b incontinence, vision change, focal weakness, dysuria, blood in urine/ stool/sputum/vomit, cough, congestion, sick contacts, SOB, CP, fever, chills. Her tylenol level was <5 but had abn LFTs and poisons board advised to continue NAC till AST/ALT fall <half and INR<1.5 and pt feels well. Subjective:   Feeling better  No vomiting but naiseated.   No diarrhoea       Medications:  Reviewed    Infusion Medications    sodium chloride 125 mL/hr at 12/03/21 5765    sodium chloride      sodium chloride       Scheduled Medications    acetylcysteine (ACETADOTE) infusion *third dose*  100 mg/kg IntraVENous Once    enoxaparin  40 mg SubCUTAneous Daily    fluticasone  1 spray Each Nostril Daily    budesonide-formoterol  2 puff Inhalation BID    montelukast  10 mg Oral Nightly    sodium chloride flush  5-40 mL IntraVENous 2 times per day    aspirin  81 mg Oral Daily     PRN Meds: sodium chloride, albuterol sulfate HFA, sodium chloride flush, sodium chloride, polyethylene glycol, ondansetron **OR** [DISCONTINUED] ondansetron, magnesium sulfate, potassium chloride **OR** potassium alternative oral replacement **OR** potassium chloride, promethazine      Intake/Output Summary (Last 24 hours) at 12/4/2021 0950  Last data filed at 12/4/2021 0559  Gross per 24 hour   Intake 250 ml   Output --   Net 250 ml       Physical Exam Performed:    /64   Pulse 50   Temp 98.4 °F (36.9 °C) (Oral)   Resp 17   Ht 5' 5\" (1.651 m)   Wt 185 lb 13.6 oz (84.3 kg)   LMP 11/11/2021   SpO2 98%   BMI 30.93 kg/m²     General appearance: No apparent distress, appears stated age and cooperative. HEENT: Pupils equal, round, and reactive to light. Conjunctivae/corneas clear. Neck: Supple, with full range of motion. No jugular venous distention. Trachea midline. Respiratory:  Normal respiratory effort. Clear to auscultation, bilaterally without Rales/Wheezes/Rhonchi. Cardiovascular: Regular rate and rhythm with normal S1/S2 without murmurs, rubs or gallops. Abdomen: Soft, non-tender, non-distended with normal bowel sounds. Musculoskeletal: No clubbing, cyanosis or edema bilaterally. Full range of motion without deformity. Skin: Skin color, texture, turgor normal.  No rashes or lesions. Neurologic:  Neurovascularly intact without any focal sensory/motor deficits.  Cranial nerves: II-XII intact, grossly non-focal.  Psychiatric: Alert and oriented, thought content appropriate, normal insight  Capillary Refill: Brisk,3 seconds, normal   Peripheral Pulses: +2 palpable, equal bilaterally       Labs:   Recent Labs     12/02/21  1813 12/03/21  1106 12/04/21  0637   WBC 6.7 10.2 5.7   HGB 14.6 14.3 13.8   HCT 44.0 43.6 41.8    292 284     Recent Labs     12/03/21  0352 12/03/21  1106 12/04/21  0637    136 139   K 3.7 3.4* 4.0    101 104   CO2 21 20* 25   BUN 8 7 7   CREATININE 0.6 0.6 0.8   CALCIUM 8.9 8.9 9.2     Recent Labs     12/03/21  0352 12/03/21  1106 12/04/21  0637   * 154* 225*   * 445* 510*   BILITOT 2.5* 2.7* 2.1*   ALKPHOS 146* 143* 147*     Recent Labs     12/02/21  2145 12/03/21  1734   INR 0.99 0.96     Recent Labs     12/02/21  2145 12/03/21  1226 12/03/21  1508   TROPONINI <0.01 <0.01 <0.01       Urinalysis:      Lab Results   Component Value Date    NITRU Negative 12/02/2021    BLOODU Negative 12/02/2021    SPECGRAV 1.006 12/02/2021    GLUCOSEU Negative 12/02/2021       Radiology:  US ABDOMEN COMPLETE   Final Result   Unremarkable abdominal ultrasound. CT HEAD WO CONTRAST   Final Result   No acute intracranial abnormality. XR CHEST PORTABLE   Final Result   No airspace disease by radiograph. CT ABDOMEN PELVIS W IV CONTRAST Additional Contrast? None   Final Result   1. Questionable wall thickening of the gastric antrum given suboptimal   evaluation as described. Correlate with presentation to exclude symptoms of   gastritis. 2. No appendicitis, diverticulitis, or small bowel obstruction. 3. Other findings as described. ASSESSMENT:  1. Abd pain, N and V  2. Acute Liver failure sec to 3 (Transamnitis/ Hyperbilirubinemia sec to 3). 3.  Accidental overdose- Tylenol Overdose, 1500 mg/day -staggered  4. Normal QTc     PLAN:  -Poisons board called and asked to continue NAC as LFTs worsened  -daily LFTs + INR  -treat till INR<1.5 and AST and ALT decreases to half the peak  -Fluid resus  -labs 2hrs before end of infusion  -check acute viral hepatitis panel  -check autoimmune panel        DVT Prophylaxis: lovenox  Diet: ADULT DIET;  Clear Liquid  Code Status: Full Code     PT/OT Eval Status: not ordered     Dispo - Keep in- inpatient       [facility] MD ZURDO

## 2021-12-04 NOTE — PROGRESS NOTES
Call from poison control. Recommended to continue another bag of 16 hour Acetadote. MD notified. New order placed.  Electronically signed by Kristi Massey RN on 12/4/2021 at 9:49 AM

## 2021-12-04 NOTE — PROGRESS NOTES
Poison control called this RN to make sure labs will be drawn 2 hours before Acetadote infusion is complete. Changed am labs to be drawn at 7am, patient resting in bed with eyes closed, will continue to monitor.

## 2021-12-04 NOTE — PLAN OF CARE
Problem: Pain:  Goal: Pain level will decrease  Description: Pain level will decrease  Outcome: Ongoing  Note: Pt pain level will decrease. Goal: Control of acute pain  Description: Control of acute pain  Outcome: Ongoing  Note: Pt will have control of acute pain. Goal: Control of chronic pain  Description: Control of chronic pain  Outcome: Ongoing  Note: Pt will have control of chronic pain   Goal: Patient's pain/discomfort is manageable  Description: Patient's pain/discomfort is manageable  Outcome: Ongoing  Note: Pt pain will be manageable. Problem: Infection:  Goal: Will remain free from infection  Description: Will remain free from infection  Outcome: Ongoing  Note: Pt will remain free from infection      Problem: Safety:  Goal: Free from accidental physical injury  Description: Free from accidental physical injury  Outcome: Ongoing  Note: Pt will be free from accidental injury. Goal: Free from intentional harm  Description: Free from intentional harm  Outcome: Ongoing  Note: Pt will be free from intentional harm. Problem: Daily Care:  Goal: Daily care needs are met  Description: Daily care needs are met  Outcome: Ongoing  Note: Pt daily care needs will be met     Problem: Skin Integrity:  Goal: Skin integrity will stabilize  Description: Skin integrity will stabilize  Outcome: Ongoing  Note: Pt skin integrity will be stabilized. Problem: Discharge Planning:  Goal: Patients continuum of care needs are met  Description: Patients continuum of care needs are met  Outcome: Ongoing  Note: Pt continuum of care needs will be met. Problem: Falls - Risk of:  Goal: Will remain free from falls  Description: Will remain free from falls  Outcome: Ongoing  Note: Pt will remain free from falls.   Goal: Absence of physical injury  Description: Absence of physical injury  Outcome: Ongoing  Note: Pt will have absence of physical injury

## 2021-12-05 LAB
A/G RATIO: 1.5 (ref 1.1–2.2)
ALBUMIN SERPL-MCNC: 3.9 G/DL (ref 3.4–5)
ALP BLD-CCNC: 152 U/L (ref 40–129)
ALT SERPL-CCNC: 472 U/L (ref 10–40)
ANION GAP SERPL CALCULATED.3IONS-SCNC: 13 MMOL/L (ref 3–16)
ANTI-NUCLEAR ANTIBODY (ANA): NEGATIVE
AST SERPL-CCNC: 128 U/L (ref 15–37)
BASOPHILS ABSOLUTE: 0 K/UL (ref 0–0.2)
BASOPHILS RELATIVE PERCENT: 0.6 %
BILIRUB SERPL-MCNC: 1 MG/DL (ref 0–1)
BUN BLDV-MCNC: 8 MG/DL (ref 7–20)
CALCIUM SERPL-MCNC: 9 MG/DL (ref 8.3–10.6)
CHLORIDE BLD-SCNC: 102 MMOL/L (ref 99–110)
CO2: 25 MMOL/L (ref 21–32)
CREAT SERPL-MCNC: 0.8 MG/DL (ref 0.6–1.1)
EOSINOPHILS ABSOLUTE: 0.4 K/UL (ref 0–0.6)
EOSINOPHILS RELATIVE PERCENT: 4.7 %
GFR AFRICAN AMERICAN: >60
GFR NON-AFRICAN AMERICAN: >60
GLUCOSE BLD-MCNC: 104 MG/DL (ref 70–99)
HCT VFR BLD CALC: 43.2 % (ref 36–48)
HEMOGLOBIN: 14.2 G/DL (ref 12–16)
INR BLD: 1.07 (ref 0.88–1.12)
LYMPHOCYTES ABSOLUTE: 2.6 K/UL (ref 1–5.1)
LYMPHOCYTES RELATIVE PERCENT: 34.4 %
MAGNESIUM: 1.9 MG/DL (ref 1.8–2.4)
MCH RBC QN AUTO: 30.5 PG (ref 26–34)
MCHC RBC AUTO-ENTMCNC: 33 G/DL (ref 31–36)
MCV RBC AUTO: 92.5 FL (ref 80–100)
MONOCYTES ABSOLUTE: 0.8 K/UL (ref 0–1.3)
MONOCYTES RELATIVE PERCENT: 9.9 %
NEUTROPHILS ABSOLUTE: 3.8 K/UL (ref 1.7–7.7)
NEUTROPHILS RELATIVE PERCENT: 50.4 %
PDW BLD-RTO: 13.1 % (ref 12.4–15.4)
PLATELET # BLD: 303 K/UL (ref 135–450)
PMV BLD AUTO: 7.8 FL (ref 5–10.5)
POTASSIUM SERPL-SCNC: 3.9 MMOL/L (ref 3.5–5.1)
PROTHROMBIN TIME: 12.1 SEC (ref 9.9–12.7)
RBC # BLD: 4.67 M/UL (ref 4–5.2)
SODIUM BLD-SCNC: 140 MMOL/L (ref 136–145)
TOTAL PROTEIN: 6.5 G/DL (ref 6.4–8.2)
WBC # BLD: 7.6 K/UL (ref 4–11)

## 2021-12-05 PROCEDURE — 6360000002 HC RX W HCPCS: Performed by: INTERNAL MEDICINE

## 2021-12-05 PROCEDURE — 6360000002 HC RX W HCPCS: Performed by: NURSE PRACTITIONER

## 2021-12-05 PROCEDURE — 83735 ASSAY OF MAGNESIUM: CPT

## 2021-12-05 PROCEDURE — 94760 N-INVAS EAR/PLS OXIMETRY 1: CPT

## 2021-12-05 PROCEDURE — 85610 PROTHROMBIN TIME: CPT

## 2021-12-05 PROCEDURE — 80053 COMPREHEN METABOLIC PANEL: CPT

## 2021-12-05 PROCEDURE — 94640 AIRWAY INHALATION TREATMENT: CPT

## 2021-12-05 PROCEDURE — 85025 COMPLETE CBC W/AUTO DIFF WBC: CPT

## 2021-12-05 PROCEDURE — 6370000000 HC RX 637 (ALT 250 FOR IP): Performed by: INTERNAL MEDICINE

## 2021-12-05 PROCEDURE — 2580000003 HC RX 258: Performed by: NURSE PRACTITIONER

## 2021-12-05 PROCEDURE — 1200000000 HC SEMI PRIVATE

## 2021-12-05 PROCEDURE — 2580000003 HC RX 258: Performed by: INTERNAL MEDICINE

## 2021-12-05 PROCEDURE — 36415 COLL VENOUS BLD VENIPUNCTURE: CPT

## 2021-12-05 RX ORDER — DICYCLOMINE HYDROCHLORIDE 10 MG/ML
20 INJECTION INTRAMUSCULAR 4 TIMES DAILY PRN
Status: DISCONTINUED | OUTPATIENT
Start: 2021-12-05 | End: 2021-12-08 | Stop reason: HOSPADM

## 2021-12-05 RX ADMIN — Medication 2 PUFF: at 19:49

## 2021-12-05 RX ADMIN — ENOXAPARIN SODIUM 40 MG: 100 INJECTION SUBCUTANEOUS at 09:03

## 2021-12-05 RX ADMIN — MONTELUKAST 10 MG: 10 TABLET, FILM COATED ORAL at 22:09

## 2021-12-05 RX ADMIN — ASPIRIN 81 MG: 81 TABLET, CHEWABLE ORAL at 09:03

## 2021-12-05 RX ADMIN — PROMETHAZINE HYDROCHLORIDE 25 MG: 25 INJECTION INTRAMUSCULAR; INTRAVENOUS at 11:35

## 2021-12-05 RX ADMIN — Medication 2 PUFF: at 10:25

## 2021-12-05 RX ADMIN — SODIUM CHLORIDE: 9 INJECTION, SOLUTION INTRAVENOUS at 11:22

## 2021-12-05 RX ADMIN — ACETYLCYSTEINE 8440 MG: 200 INJECTION, SOLUTION INTRAVENOUS at 12:08

## 2021-12-05 RX ADMIN — ONDANSETRON 4 MG: 4 TABLET, ORALLY DISINTEGRATING ORAL at 11:15

## 2021-12-05 RX ADMIN — SODIUM CHLORIDE, PRESERVATIVE FREE 10 ML: 5 INJECTION INTRAVENOUS at 09:04

## 2021-12-05 ASSESSMENT — PAIN SCALES - GENERAL
PAINLEVEL_OUTOF10: 0
PAINLEVEL_OUTOF10: 0

## 2021-12-05 NOTE — PROGRESS NOTES
Poison control called to check on patient. Poison control recommending a repeat ALT/AST and INR. Perfect serve message sent to on call MD, [facility], as patient does not currently have AST/ALT on morning lab orders.  Will wait for response from MD.

## 2021-12-05 NOTE — PLAN OF CARE
Problem: Pain:  Goal: Pain level will decrease  Description: Pain level will decrease  Outcome: Ongoing  Note: Pt assessed for pain. Pt denies any pain at this time. Will continue to monitor pt and assess for pain throughout rest of shift. Goal: Control of acute pain  Description: Control of acute pain  Outcome: Ongoing  Note: Pt assessed for pain. Pt denies any pain at this time. Will continue to monitor pt and assess for pain throughout rest of shift. Goal: Control of chronic pain  Description: Control of chronic pain  Outcome: Ongoing  Note: Pt assessed for pain. Pt denies any pain at this time. Will continue to monitor pt and assess for pain throughout rest of shift. Goal: Patient's pain/discomfort is manageable  Description: Patient's pain/discomfort is manageable  Outcome: Ongoing  Note: Pt assessed for pain. Pt denies any pain at this time. Will continue to monitor pt and assess for pain throughout rest of shift. Problem: Infection:  Goal: Will remain free from infection  Description: Will remain free from infection  Outcome: Ongoing  Note: Pt remains free of infection. Will monitor patient, labs and vitals. Problem: Safety:  Goal: Free from accidental physical injury  Description: Free from accidental physical injury  Outcome: Ongoing  Note: Pt is free of injury. Will monitor. Goal: Free from intentional harm  Description: Free from intentional harm  Outcome: Ongoing  Note: Pt is free of intentional harm. No intentions noted. Will monitor. Problem: Daily Care:  Goal: Daily care needs are met  Description: Daily care needs are met  Outcome: Ongoing  Note: Pt daily care needs are being met by patient and staff. Will monitor. Problem: Skin Integrity:  Goal: Skin integrity will stabilize  Description: Skin integrity will stabilize  Outcome: Ongoing  Note: Pt shows no new signs of skin integrity. Will monitor.       Problem: Discharge Planning:  Goal: Patients continuum of care needs are met  Description: Patients continuum of care needs are met  Outcome: Ongoing  Note: Pt LAZARUS needs are being met. Medical team and SW following. Problem: Falls - Risk of:  Goal: Will remain free from falls  Description: Will remain free from falls  Outcome: Ongoing  Note: Fall risk assessment completed. Fall precautions in place. Call light within reach. Pt educated on calling for assistance. Walkway free of clutter. Will continue to monitor. Goal: Absence of physical injury  Description: Absence of physical injury  Outcome: Ongoing  Note: Pt is free of injury. No injury noted. Fall precautions in place. Call light within reach. Will monitor.

## 2021-12-05 NOTE — PROGRESS NOTES
Pt A&O in the bed. Pt tolerating PO intake well. AM medications administered without complications. Pt has no complaints at this time. UAL in the room. Call light within reach. Able to make needs known. Fall precautions in place. Will monitor.  Electronically signed by Deyvi Scott RN on 12/5/2021 at 6:44 PM

## 2021-12-05 NOTE — PROGRESS NOTES
While rounding, batteries in telemetry box were changed and BP was rechecked, as the PCA reported a high BP of 211/66. Manual check of BP showed 110/66, patient denies headache, lightheaded or dizziness. Patient complaining of pain 6/10 on the 0-10 pain scale. Perfect serve message sent to on call NP, Jazmín Brewster, regarding patient complaints of pain. Will update patient with NP response.

## 2021-12-05 NOTE — PROGRESS NOTES
Pt states that she is feeling better. PRN medication effective. Will monitor.  Electronically signed by Anita Marc RN on 12/5/2021 at 6:47 PM

## 2021-12-05 NOTE — PROGRESS NOTES
Pt complains of nausea and dizziness. PRN antiemetic administered and IVF started per MD order. Will monitor.  Electronically signed by Winston Galvin RN on 12/5/2021 at 6:47 PM

## 2021-12-05 NOTE — PROGRESS NOTES
MD notified that Acetadote was completed and inquired about another bag per poison control's recommendations. New order for Acetadote.  Electronically signed by Danette Saxena RN on 12/5/2021 at 6:46 PM

## 2021-12-05 NOTE — PROGRESS NOTES
Hospitalist Progress Note      PCP: No primary care provider on file. Date of Admission: 12/2/2021    Chief Complaint:  N/V    Hospital Course: The patient is a 27 y.o. female who presents to [facility] with main concern of second episode of syncope in the past 2 days without any major trigger. For the past 3 days patient has felt unwell but initially started as just generalized weakness. She has had decreased appetite, nausea and nonbloody oft bilous vomitng. The 2 syncopal episodes were  related to her vomiting and feeling lightheaded. She has not eaten much over the last 2 days. She on further questioning alludes to taking a staggered above normal amount of tylenol over the last few days above 1500mg in a 24 period daily but mostly to help her sleep at night. She denies any previous abdominal issues or liver issues. No h/o BTx, excess alcohol, IVDU, jaundice. She did use Kratom 2 days ago to see if it would help some lower abdominal pain that she seems to chronically have- the pain is of uncertain etiology but has not been confirmed to be entirely of endometriosis nature. She denies other symptoms of dysuria, seizure disorder, b/b incontinence, vision change, focal weakness, dysuria, blood in urine/ stool/sputum/vomit, cough, congestion, sick contacts, SOB, CP, fever, chills. Her tylenol level was <5 but had abn LFTs and poisons board advised to continue NAC till AST/ALT fall <half and INR<1.5 and pt feels well. Subjective:   Feeling better  No vomiting but naiseated.   No diarrhoea       Medications:  Reviewed    Infusion Medications    sodium chloride 125 mL/hr at 12/03/21 8143    sodium chloride      sodium chloride       Scheduled Medications    GI cocktail   Oral Once    enoxaparin  40 mg SubCUTAneous Daily    fluticasone  1 spray Each Nostril Daily    budesonide-formoterol  2 puff Inhalation BID    montelukast  10 mg Oral Nightly    sodium chloride flush  5-40 mL IntraVENous 2 times per day    aspirin  81 mg Oral Daily     PRN Meds: dicyclomine, sodium chloride, albuterol sulfate HFA, sodium chloride flush, sodium chloride, polyethylene glycol, ondansetron **OR** [DISCONTINUED] ondansetron, magnesium sulfate, potassium chloride **OR** potassium alternative oral replacement **OR** potassium chloride, promethazine      Intake/Output Summary (Last 24 hours) at 12/5/2021 0917  Last data filed at 12/4/2021 1909  Gross per 24 hour   Intake 480 ml   Output --   Net 480 ml       Physical Exam Performed:    /73   Pulse 80   Temp 98.2 °F (36.8 °C) (Oral)   Resp 16   Ht 5' 5\" (1.651 m)   Wt 186 lb 1.1 oz (84.4 kg)   LMP 11/11/2021   SpO2 97%   BMI 30.96 kg/m²     General appearance: No apparent distress, appears stated age and cooperative. HEENT: Pupils equal, round, and reactive to light. Conjunctivae/corneas clear. Neck: Supple, with full range of motion. No jugular venous distention. Trachea midline. Respiratory:  Normal respiratory effort. Clear to auscultation, bilaterally without Rales/Wheezes/Rhonchi. Cardiovascular: Regular rate and rhythm with normal S1/S2 without murmurs, rubs or gallops. Abdomen: Soft, non-tender, non-distended with normal bowel sounds. Musculoskeletal: No clubbing, cyanosis or edema bilaterally. Full range of motion without deformity. Skin: Skin color, texture, turgor normal.  No rashes or lesions. Neurologic:  Neurovascularly intact without any focal sensory/motor deficits.  Cranial nerves: II-XII intact, grossly non-focal.  Psychiatric: Alert and oriented, thought content appropriate, normal insight  Capillary Refill: Brisk,3 seconds, normal   Peripheral Pulses: +2 palpable, equal bilaterally       Labs:   Recent Labs     12/03/21  1106 12/04/21  0637 12/05/21  0509   WBC 10.2 5.7 7.6   HGB 14.3 13.8 14.2   HCT 43.6 41.8 43.2    284 303     Recent Labs     12/03/21  1106 12/04/21  0637 12/05/21  0509    139 140   K 3.4* 4.0 3.9  104 102   CO2 20* 25 25   BUN 7 7 8   CREATININE 0.6 0.8 0.8   CALCIUM 8.9 9.2 9.0     Recent Labs     12/03/21  1106 12/04/21  0637 12/05/21  0509   * 225* 128*   * 510* 472*   BILITOT 2.7* 2.1* 1.0   ALKPHOS 143* 147* 152*     Recent Labs     12/03/21  1734 12/04/21  1005 12/05/21  0509   INR 0.96 0.99 1.07     Recent Labs     12/02/21  2145 12/03/21  1226 12/03/21  1508   TROPONINI <0.01 <0.01 <0.01       Urinalysis:      Lab Results   Component Value Date    NITRU Negative 12/02/2021    BLOODU Negative 12/02/2021    SPECGRAV 1.006 12/02/2021    GLUCOSEU Negative 12/02/2021       Radiology:  US ABDOMEN COMPLETE   Final Result   Unremarkable abdominal ultrasound. CT HEAD WO CONTRAST   Final Result   No acute intracranial abnormality. XR CHEST PORTABLE   Final Result   No airspace disease by radiograph. CT ABDOMEN PELVIS W IV CONTRAST Additional Contrast? None   Final Result   1. Questionable wall thickening of the gastric antrum given suboptimal   evaluation as described. Correlate with presentation to exclude symptoms of   gastritis. 2. No appendicitis, diverticulitis, or small bowel obstruction. 3. Other findings as described. ASSESSMENT:  1. Abd pain, N and V  2. Acute Hepatitis/Liver failure sec to 3 (Transamnitis improving. Viral hepatitis panel, REBECCA negative  3. Accidental overdose- Tylenol Overdose, 1500 mg/day -staggered  4. Normal QTc     PLAN:  - continue NAC till LFTs are at half the peak.  -daily LFTs + INR  -treat till INR<1.5 and AST and ALT decreases to half the peak  -Fluid resus  -check acute viral hepatitis panel  -check autoimmune panel  -consult GI        DVT Prophylaxis: lovenox  Diet: ADULT DIET;  Clear Liquid  Code Status: Full Code     PT/OT Eval Status: not ordered     Dispo - Keep in- inpatient       [facility] MD ZURDO

## 2021-12-06 LAB
A/G RATIO: 2.1 (ref 1.1–2.2)
ALBUMIN SERPL-MCNC: 3.9 G/DL (ref 3.4–5)
ALP BLD-CCNC: 131 U/L (ref 40–129)
ALT SERPL-CCNC: 371 U/L (ref 10–40)
ANION GAP SERPL CALCULATED.3IONS-SCNC: 11 MMOL/L (ref 3–16)
AST SERPL-CCNC: 103 U/L (ref 15–37)
BASOPHILS ABSOLUTE: 0.1 K/UL (ref 0–0.2)
BASOPHILS RELATIVE PERCENT: 0.8 %
BILIRUB SERPL-MCNC: 0.8 MG/DL (ref 0–1)
BUN BLDV-MCNC: 5 MG/DL (ref 7–20)
CALCIUM SERPL-MCNC: 8.8 MG/DL (ref 8.3–10.6)
CHLORIDE BLD-SCNC: 108 MMOL/L (ref 99–110)
CO2: 23 MMOL/L (ref 21–32)
CREAT SERPL-MCNC: 0.6 MG/DL (ref 0.6–1.1)
EOSINOPHILS ABSOLUTE: 0.3 K/UL (ref 0–0.6)
EOSINOPHILS RELATIVE PERCENT: 4.7 %
FERRITIN: 280.3 NG/ML (ref 15–150)
GFR AFRICAN AMERICAN: >60
GFR NON-AFRICAN AMERICAN: >60
GLUCOSE BLD-MCNC: 86 MG/DL (ref 70–99)
HCT VFR BLD CALC: 37.8 % (ref 36–48)
HEMOGLOBIN: 12.7 G/DL (ref 12–16)
INR BLD: 1.05 (ref 0.88–1.12)
IRON SATURATION: 56 % (ref 15–50)
IRON: 161 UG/DL (ref 37–145)
LYMPHOCYTES ABSOLUTE: 2.2 K/UL (ref 1–5.1)
LYMPHOCYTES RELATIVE PERCENT: 31.7 %
MAGNESIUM: 2.1 MG/DL (ref 1.8–2.4)
MCH RBC QN AUTO: 30.7 PG (ref 26–34)
MCHC RBC AUTO-ENTMCNC: 33.6 G/DL (ref 31–36)
MCV RBC AUTO: 91.3 FL (ref 80–100)
MONOCYTES ABSOLUTE: 0.7 K/UL (ref 0–1.3)
MONOCYTES RELATIVE PERCENT: 10.2 %
NEUTROPHILS ABSOLUTE: 3.7 K/UL (ref 1.7–7.7)
NEUTROPHILS RELATIVE PERCENT: 52.6 %
PDW BLD-RTO: 13 % (ref 12.4–15.4)
PLATELET # BLD: 289 K/UL (ref 135–450)
PMV BLD AUTO: 7.6 FL (ref 5–10.5)
POTASSIUM SERPL-SCNC: 3.9 MMOL/L (ref 3.5–5.1)
PROTHROMBIN TIME: 11.9 SEC (ref 9.9–12.7)
RBC # BLD: 4.15 M/UL (ref 4–5.2)
SODIUM BLD-SCNC: 142 MMOL/L (ref 136–145)
TOTAL IRON BINDING CAPACITY: 286 UG/DL (ref 260–445)
TOTAL PROTEIN: 5.8 G/DL (ref 6.4–8.2)
WBC # BLD: 7 K/UL (ref 4–11)

## 2021-12-06 PROCEDURE — 82728 ASSAY OF FERRITIN: CPT

## 2021-12-06 PROCEDURE — 6360000002 HC RX W HCPCS: Performed by: NURSE PRACTITIONER

## 2021-12-06 PROCEDURE — 83540 ASSAY OF IRON: CPT

## 2021-12-06 PROCEDURE — 2580000003 HC RX 258: Performed by: INTERNAL MEDICINE

## 2021-12-06 PROCEDURE — 85025 COMPLETE CBC W/AUTO DIFF WBC: CPT

## 2021-12-06 PROCEDURE — 94640 AIRWAY INHALATION TREATMENT: CPT

## 2021-12-06 PROCEDURE — 6360000002 HC RX W HCPCS: Performed by: INTERNAL MEDICINE

## 2021-12-06 PROCEDURE — 36415 COLL VENOUS BLD VENIPUNCTURE: CPT

## 2021-12-06 PROCEDURE — 85610 PROTHROMBIN TIME: CPT

## 2021-12-06 PROCEDURE — 1200000000 HC SEMI PRIVATE

## 2021-12-06 PROCEDURE — 83735 ASSAY OF MAGNESIUM: CPT

## 2021-12-06 PROCEDURE — 83516 IMMUNOASSAY NONANTIBODY: CPT

## 2021-12-06 PROCEDURE — 82390 ASSAY OF CERULOPLASMIN: CPT

## 2021-12-06 PROCEDURE — 80053 COMPREHEN METABOLIC PANEL: CPT

## 2021-12-06 PROCEDURE — 6370000000 HC RX 637 (ALT 250 FOR IP): Performed by: INTERNAL MEDICINE

## 2021-12-06 PROCEDURE — 94761 N-INVAS EAR/PLS OXIMETRY MLT: CPT

## 2021-12-06 PROCEDURE — 82103 ALPHA-1-ANTITRYPSIN TOTAL: CPT

## 2021-12-06 PROCEDURE — 83550 IRON BINDING TEST: CPT

## 2021-12-06 RX ORDER — PANTOPRAZOLE SODIUM 40 MG/1
40 TABLET, DELAYED RELEASE ORAL
Status: DISCONTINUED | OUTPATIENT
Start: 2021-12-06 | End: 2021-12-08 | Stop reason: HOSPADM

## 2021-12-06 RX ADMIN — ACETYLCYSTEINE 8440 MG: 200 INJECTION, SOLUTION INTRAVENOUS at 13:50

## 2021-12-06 RX ADMIN — MONTELUKAST 10 MG: 10 TABLET, FILM COATED ORAL at 21:54

## 2021-12-06 RX ADMIN — Medication 2 PUFF: at 09:54

## 2021-12-06 RX ADMIN — ASPIRIN 81 MG: 81 TABLET, CHEWABLE ORAL at 09:37

## 2021-12-06 RX ADMIN — DICYCLOMINE HYDROCHLORIDE 20 MG: 20 INJECTION, SOLUTION INTRAMUSCULAR at 16:55

## 2021-12-06 RX ADMIN — ENOXAPARIN SODIUM 40 MG: 100 INJECTION SUBCUTANEOUS at 09:37

## 2021-12-06 RX ADMIN — PANTOPRAZOLE SODIUM 40 MG: 40 TABLET, DELAYED RELEASE ORAL at 09:43

## 2021-12-06 ASSESSMENT — PAIN SCALES - GENERAL
PAINLEVEL_OUTOF10: 0
PAINLEVEL_OUTOF10: 0

## 2021-12-06 NOTE — PROGRESS NOTES
Hospitalist Progress Note      PCP: No primary care provider on file. Date of Admission: 12/2/2021    Chief Complaint:  N/V    Hospital Course: The patient is a 27 y.o. female who presents to [facility] with main concern of second episode of syncope in the past 2 days without any major trigger. For the past 3 days patient has felt unwell but initially started as just generalized weakness. She has had decreased appetite, nausea and nonbloody oft bilous vomitng. The 2 syncopal episodes were  related to her vomiting and feeling lightheaded. She has not eaten much over the last 2 days. She on further questioning alludes to taking a staggered above normal amount of tylenol over the last few days above 1500mg in a 24 period daily but mostly to help her sleep at night. She denies any previous abdominal issues or liver issues. No h/o BTx, excess alcohol, IVDU, jaundice. She did use Kratom 2 days ago to see if it would help some lower abdominal pain that she seems to chronically have- the pain is of uncertain etiology but has not been confirmed to be entirely of endometriosis nature. She denies other symptoms of dysuria, seizure disorder, b/b incontinence, vision change, focal weakness, dysuria, blood in urine/ stool/sputum/vomit, cough, congestion, sick contacts, SOB, CP, fever, chills. Her tylenol level was <5 but had abn LFTs and poisons board advised to continue NAC till AST/ALT fall <half and INR<1.5 and pt feels well. Subjective:   Feeling better  No vomiting but naiseated.   No diarrhoea       Medications:  Reviewed    Infusion Medications    sodium chloride 125 mL/hr at 12/05/21 1122    sodium chloride      sodium chloride       Scheduled Medications    pantoprazole  40 mg Oral QAM AC    GI cocktail   Oral Once    enoxaparin  40 mg SubCUTAneous Daily    fluticasone  1 spray Each Nostril Daily    budesonide-formoterol  2 puff Inhalation BID    montelukast  10 mg Oral Nightly    sodium chloride flush  5-40 mL IntraVENous 2 times per day    aspirin  81 mg Oral Daily     PRN Meds: dicyclomine, sodium chloride, albuterol sulfate HFA, sodium chloride flush, sodium chloride, polyethylene glycol, ondansetron **OR** [DISCONTINUED] ondansetron, magnesium sulfate, potassium chloride **OR** potassium alternative oral replacement **OR** potassium chloride, promethazine      Intake/Output Summary (Last 24 hours) at 12/6/2021 1003  Last data filed at 12/5/2021 1909  Gross per 24 hour   Intake 720 ml   Output --   Net 720 ml       Physical Exam Performed:    /60   Pulse 75   Temp 98 °F (36.7 °C) (Oral)   Resp 15   Ht 5' 5\" (1.651 m)   Wt 192 lb 10.9 oz (87.4 kg)   LMP 11/11/2021   SpO2 98%   BMI 32.06 kg/m²     General appearance: No apparent distress, appears stated age and cooperative. HEENT: Pupils equal, round, and reactive to light. Conjunctivae/corneas clear. Neck: Supple, with full range of motion. No jugular venous distention. Trachea midline. Respiratory:  Normal respiratory effort. Clear to auscultation, bilaterally without Rales/Wheezes/Rhonchi. Cardiovascular: Regular rate and rhythm with normal S1/S2 without murmurs, rubs or gallops. Abdomen: Soft, non-tender, non-distended with normal bowel sounds. Musculoskeletal: No clubbing, cyanosis or edema bilaterally. Full range of motion without deformity. Skin: Skin color, texture, turgor normal.  No rashes or lesions. Neurologic:  Neurovascularly intact without any focal sensory/motor deficits.  Cranial nerves: II-XII intact, grossly non-focal.  Psychiatric: Alert and oriented, thought content appropriate, normal insight  Capillary Refill: Brisk,3 seconds, normal   Peripheral Pulses: +2 palpable, equal bilaterally       Labs:   Recent Labs     12/04/21  0637 12/05/21  0509 12/06/21  0733   WBC 5.7 7.6 7.0   HGB 13.8 14.2 12.7   HCT 41.8 43.2 37.8    303 289     Recent Labs     12/04/21  0637 12/05/21  0509 12/06/21  0783  140 142   K 4.0 3.9 3.9    102 108   CO2 25 25 23   BUN 7 8 5*   CREATININE 0.8 0.8 0.6   CALCIUM 9.2 9.0 8.8     Recent Labs     12/04/21  0637 12/05/21  0509 12/06/21  0733   * 128* 103*   * 472* 371*   BILITOT 2.1* 1.0 0.8   ALKPHOS 147* 152* 131*     Recent Labs     12/04/21  1005 12/05/21  0509 12/06/21  0733   INR 0.99 1.07 1.05     Recent Labs     12/03/21  1226 12/03/21  1508   TROPONINI <0.01 <0.01       Urinalysis:      Lab Results   Component Value Date    NITRU Negative 12/02/2021    BLOODU Negative 12/02/2021    SPECGRAV 1.006 12/02/2021    GLUCOSEU Negative 12/02/2021       Radiology:  US ABDOMEN COMPLETE   Final Result   Unremarkable abdominal ultrasound. CT HEAD WO CONTRAST   Final Result   No acute intracranial abnormality. XR CHEST PORTABLE   Final Result   No airspace disease by radiograph. CT ABDOMEN PELVIS W IV CONTRAST Additional Contrast? None   Final Result   1. Questionable wall thickening of the gastric antrum given suboptimal   evaluation as described. Correlate with presentation to exclude symptoms of   gastritis. 2. No appendicitis, diverticulitis, or small bowel obstruction. 3. Other findings as described. ASSESSMENT:  1. Abd pain, N and V  2. Acute Hepatitis/Liver failure sec to 3 (Transamnitis improving. Viral hepatitis panel, REBECCA negative  3. Accidental overdose- Tylenol Overdose, 1500 mg/day -staggered  4. Normal QTc     PLAN:  - continue NAC till LFTs are at half the peak  -poisons board called today and asked that she gets her last bag and LFT recheck today 12/6.  -treat till INR<1.5 and AST and ALT decreases to half the peak  -daily LFTs + INR  -await GI consult        DVT Prophylaxis: lovenox  Diet: ADULT DIET;  Clear Liquid  Code Status: Full Code     PT/OT Eval Status: not ordered     Dispo - Keep in- inpatient       [facility] MD ZURDO

## 2021-12-06 NOTE — PLAN OF CARE
Problem: Pain:  Goal: Pain level will decrease  Description: Pain level will decrease  12/6/2021 0009 by Katia Triana RN  Outcome: Ongoing  Note: Educated patient on pain management. Will assess patients pain level per unit protocol, and provide pain management measures as needed. 12/5/2021 1848 by Christina Cash RN  Outcome: Ongoing  Note: Pt assessed for pain. Pt denies any pain at this time. Will continue to monitor pt and assess for pain throughout rest of shift. Goal: Control of acute pain  Description: Control of acute pain  12/6/2021 0009 by Katia Triana RN  Outcome: Ongoing  Note: Patient educated on acute pain. Taught patient to use call light to ask for pain medication. PRN pain medication given for acute pain. Will continue to monitor pain per unit protocol. 12/5/2021 1848 by Christina Cash RN  Outcome: Ongoing  Note: Pt assessed for pain. Pt denies any pain at this time. Will continue to monitor pt and assess for pain throughout rest of shift. Goal: Control of chronic pain  Description: Control of chronic pain  12/6/2021 0009 by Katia Triana RN  Outcome: Ongoing  Note: Patient educated on chronic pain. Taught patient to use call light to ask for pain medication. Will continue to monitor pain per unit protocol. 12/5/2021 1848 by Christina Cash RN  Outcome: Ongoing  Note: Pt assessed for pain. Pt denies any pain at this time. Will continue to monitor pt and assess for pain throughout rest of shift. Goal: Patient's pain/discomfort is manageable  Description: Patient's pain/discomfort is manageable  12/6/2021 0009 by Katia Triana RN  Outcome: Ongoing  Note: Educated patient on pain management. Will assess patients pain level per unit protocol, and provide pain management measures as needed. 12/5/2021 1848 by Christina Cash RN  Outcome: Ongoing  Note: Pt assessed for pain. Pt denies any pain at this time.  Will continue to monitor pt and assess for pain throughout rest of shift. Problem: Infection:  Goal: Will remain free from infection  Description: Will remain free from infection  12/6/2021 0009 by Rosie Lorenzo RN  Outcome: Ongoing  Note: Patient will remain free from infection. 12/5/2021 1848 by Darryl Marie RN  Outcome: Ongoing  Note: Pt remains free of infection. Will monitor patient, labs and vitals. Problem: Safety:  Goal: Free from accidental physical injury  Description: Free from accidental physical injury  12/6/2021 0009 by Rosie Lorenzo RN  Outcome: Ongoing  Note: Pt assessed for fall risk and fall precautions put into place. Bed in lowest position and wheels locked, call light within reach. Nonskid footwear in place. Patient educated on appropriate method of transfer and to call for assistance. 12/5/2021 1848 by Darryl Marie RN  Outcome: Ongoing  Note: Pt is free of injury. Will monitor. Goal: Free from intentional harm  Description: Free from intentional harm  12/6/2021 0009 by Rosie Lorenzo RN  Outcome: Ongoing  Note: Patient remains free from intentional harm, no signs or symptoms of intention to harm noted. Will continue to monitor patient throughout shift. 12/5/2021 1848 by Darryl Marie RN  Outcome: Ongoing  Note: Pt is free of intentional harm. No intentions noted. Will monitor. Problem: Daily Care:  Goal: Daily care needs are met  Description: Daily care needs are met  12/6/2021 0009 by Rosie Lorenzo RN  Outcome: Ongoing  Note: Patient assessed to determine ability to perform daily needs and ADLs. Patient assisted with any daily needs that were not able to be met individually by patient. [place] encouraged, although patient educated to ask for assistance when needed. Will continue to monitor and assist patient with meeting daily care needs as needed. 12/5/2021 1848 by Darryl Marie RN  Outcome: Ongoing  Note: Pt daily care needs are being met by patient and staff. Will monitor. Problem: Skin Integrity:  Goal: Skin integrity will stabilize  Description: Skin integrity will stabilize  12/6/2021 0009 by Jae Helms RN  Outcome: Ongoing  Note: Will monitor skin and mucous members. Will turn patient every 2 hours, monitor for friction and sheering, and change dressings as needed. Will preform skin assessment every shift. 12/5/2021 1848 by Don Patton RN  Outcome: Ongoing  Note: Pt shows no new signs of skin integrity. Will monitor. Problem: Discharge Planning:  Goal: Patients continuum of care needs are met  Description: Patients continuum of care needs are met  12/6/2021 0009 by Jae Helms RN  Outcome: Ongoing  Note: Assessed patients knowledge of discharge. Will continue to work with patient on discharge planning and answer patient questions. Will consult case management and  as necessary. 12/5/2021 1848 by Don Patton RN  Outcome: Ongoing  Note: Pt LAZARUS needs are being met. Medical team and SW following. Problem: Falls - Risk of:  Goal: Will remain free from falls  Description: Will remain free from falls  12/6/2021 0009 by Jae Helms RN  Outcome: Ongoing  Note: Patient educated on fall prevention. Call light is within reach, bed locked in lowest position, personal items within reach, and bed alarm is on. Will round on patient per unit guidelines. 12/5/2021 1848 by Don Patton RN  Outcome: Ongoing  Note: Fall risk assessment completed. Fall precautions in place. Call light within reach. Pt educated on calling for assistance. Walkway free of clutter. Will continue to monitor. Goal: Absence of physical injury  Description: Absence of physical injury  12/6/2021 0009 by Jae Helms RN  Outcome: Ongoing  Note: Pt assessed for fall risk and fall precautions put into place. Bed in lowest position and wheels locked, call light within reach. Nonskid footwear in place.  Patient educated on appropriate method of transfer and to call for assistance. 12/5/2021 1848 by Darryl Marie, RN  Outcome: Ongoing  Note: Pt is free of injury. No injury noted. Fall precautions in place. Call light within reach. Will monitor.

## 2021-12-06 NOTE — PLAN OF CARE
Problem: Pain:  Goal: Pain level will decrease  Description: Pain level will decrease  12/6/2021 1107 by Brandan Odom RN  Outcome: Ongoing  Note: Pain managed with pharmacologic and non-pharmacologic interventions during this shift. Will continue to monitor and assess for needs and change in patient condition. 12/6/2021 0009 by Tamir Beaulieu RN  Outcome: Ongoing  Note: Educated patient on pain management. Will assess patients pain level per unit protocol, and provide pain management measures as needed. Goal: Control of acute pain  Description: Control of acute pain  12/6/2021 1107 by Brandan Odom RN  Outcome: Ongoing  Note: Pain managed with pharmacologic and non-pharmacologic interventions during this shift. Will continue to monitor and assess for needs and change in patient condition. 12/6/2021 0009 by Tamir Beaulieu RN  Outcome: Ongoing  Note: Patient educated on acute pain. Taught patient to use call light to ask for pain medication. PRN pain medication given for acute pain. Will continue to monitor pain per unit protocol. Goal: Control of chronic pain  Description: Control of chronic pain  12/6/2021 1107 by Brandan Odom RN  Outcome: Ongoing  Note: Pain managed with pharmacologic and non-pharmacologic interventions during this shift. Will continue to monitor and assess for needs and change in patient condition. 12/6/2021 0009 by Tamir Beaulieu RN  Outcome: Ongoing  Note: Patient educated on chronic pain. Taught patient to use call light to ask for pain medication. Will continue to monitor pain per unit protocol. Goal: Patient's pain/discomfort is manageable  Description: Patient's pain/discomfort is manageable  12/6/2021 1107 by Brandan Odom RN  Outcome: Ongoing  12/6/2021 0009 by Tamir Beaulieu RN  Outcome: Ongoing  Note: Educated patient on pain management. Will assess patients pain level per unit protocol, and provide pain management measures as needed. Problem: Infection:  Goal: Will remain free from infection  Description: Will remain free from infection  12/6/2021 1107 by Radha Castañeda RN  Outcome: Ongoing  Note: Patient remains free from new signs and symptoms of infection during this shift. Infection prevention measures are in place. Will continue to monitor for alterations in patient condition throughout the shift. 12/6/2021 0009 by Phil Gong RN  Outcome: Ongoing  Note: Patient will remain free from infection. Problem: Safety:  Goal: Free from accidental physical injury  Description: Free from accidental physical injury  12/6/2021 1107 by Radha Castañeda RN  Outcome: Ongoing  Note: Patient remains free from physical harm during this shift. Will continue to monitor and assess patient. 12/6/2021 0009 by Phil Gong RN  Outcome: Ongoing  Note: Pt assessed for fall risk and fall precautions put into place. Bed in lowest position and wheels locked, call light within reach. Nonskid footwear in place. Patient educated on appropriate method of transfer and to call for assistance. Goal: Free from intentional harm  Description: Free from intentional harm  12/6/2021 1107 by Radha Castañeda RN  Outcome: Ongoing  12/6/2021 0009 by Phil Gong RN  Outcome: Ongoing  Note: Patient remains free from intentional harm, no signs or symptoms of intention to harm noted. Will continue to monitor patient throughout shift. Problem: Daily Care:  Goal: Daily care needs are met  Description: Daily care needs are met  12/6/2021 1107 by Radha Castañeda RN  Outcome: Ongoing  Note: Patient reports all needs are met at this time, will continue to monitor and assess   12/6/2021 0009 by Phil Gong RN  Outcome: Ongoing  Note: Patient assessed to determine ability to perform daily needs and ADLs. Patient assisted with any daily needs that were not able to be met individually by patient.  [place] encouraged, although patient educated to ask for assistance when needed. Will continue to monitor and assist patient with meeting daily care needs as needed. Problem: Skin Integrity:  Goal: Skin integrity will stabilize  Description: Skin integrity will stabilize  12/6/2021 1107 by Ana Tompkins RN  Outcome: Ongoing  Note: Patient skin condition and mucus membrane integrity remain unchanged during this shift. Skin breakdown prevention interventions are in place. Will continue to monitor and assess. 12/6/2021 0009 by Gurinder Quintanilla RN  Outcome: Ongoing  Note: Will monitor skin and mucous members. Will turn patient every 2 hours, monitor for friction and sheering, and change dressings as needed. Will preform skin assessment every shift. Problem: Discharge Planning:  Goal: Patients continuum of care needs are met  Description: Patients continuum of care needs are met  12/6/2021 1107 by Ana Tompkins RN  Outcome: Ongoing  Note: Patient reports all needs are met at this time   12/6/2021 0009 by Gurinder Quintanilla RN  Outcome: Ongoing  Note: Assessed patients knowledge of discharge. Will continue to work with patient on discharge planning and answer patient questions. Will consult case management and  as necessary. Problem: Falls - Risk of:  Goal: Will remain free from falls  Description: Will remain free from falls  12/6/2021 1107 by Ana Tompkins RN  Outcome: Ongoing  Note: Patient remains free from falls during this shift. Fall precautions remain in place. Patient educated on the need to implement call light use prior to ambulation. Will continue to monitor and assess. 12/6/2021 0009 by Gurinder Quintanilla RN  Outcome: Ongoing  Note: Patient educated on fall prevention. Call light is within reach, bed locked in lowest position, personal items within reach, and bed alarm is on. Will round on patient per unit guidelines.     Goal: Absence of physical injury  Description: Absence of physical injury  12/6/2021 1107 by Meryl Infante RN  Outcome: Ongoing  Note: Patient remains free from physical harm during this shift. Will continue to monitor and assess patient. 12/6/2021 0009 by Rodrigo Carranza RN  Outcome: Ongoing  Note: Pt assessed for fall risk and fall precautions put into place. Bed in lowest position and wheels locked, call light within reach. Nonskid footwear in place. Patient educated on appropriate method of transfer and to call for assistance.

## 2021-12-06 NOTE — PROGRESS NOTES
This RN received call from poison control checking on patient condition and lab values this morning. This RN provided update. Based on ALT of 371 poison control is advising 1 more dose of acetylcysteine be administered w/ AST, ALT, and INR checks when infusion has 2 hours remaining. This information was conveyed to Dr. Israel Chavez at the bedside. MD deferred order to the hospitalist. This RN spoke w/ Dr. Cooper Berry who provided verbal order to place additional 1 time dose and lab orders. See orders.

## 2021-12-06 NOTE — PROGRESS NOTES
Heating pad set up per patient request for management of lower abdomen/ pelvic pain. Patient reports satisfaction w/ this intervention. Denies further needs.

## 2021-12-06 NOTE — PROGRESS NOTES
Patient resting in bed this morning, denies pain, nausea, and/or vomiting. States she is feeling much better. Scheduled morning medications administered per orders - see eMAR for documentation. Shift assessment completed and documented. Patient denies physical/emotional needs at this time. Call light, telephone, and bed side table are within reach. Fall precautions in place. Bed/chair alarm off as patient ambulates independently w/ steady gait. Will continue to monitor and assess.

## 2021-12-07 LAB
A/G RATIO: 1.2 (ref 1.1–2.2)
ALBUMIN SERPL-MCNC: 3.6 G/DL (ref 3.4–5)
ALP BLD-CCNC: 136 U/L (ref 40–129)
ALPHA-1 ANTITRYPSIN: 144 MG/DL (ref 90–200)
ALT SERPL-CCNC: 380 U/L (ref 10–40)
ANION GAP SERPL CALCULATED.3IONS-SCNC: 12 MMOL/L (ref 3–16)
AST SERPL-CCNC: 103 U/L (ref 15–37)
BASOPHILS ABSOLUTE: 0.1 K/UL (ref 0–0.2)
BASOPHILS RELATIVE PERCENT: 1.4 %
BILIRUB SERPL-MCNC: 0.8 MG/DL (ref 0–1)
BUN BLDV-MCNC: 7 MG/DL (ref 7–20)
CALCIUM SERPL-MCNC: 9.3 MG/DL (ref 8.3–10.6)
CERULOPLASMIN: 26 MG/DL (ref 16–45)
CHLORIDE BLD-SCNC: 102 MMOL/L (ref 99–110)
CO2: 27 MMOL/L (ref 21–32)
CREAT SERPL-MCNC: 0.7 MG/DL (ref 0.6–1.1)
EOSINOPHILS ABSOLUTE: 0.4 K/UL (ref 0–0.6)
EOSINOPHILS RELATIVE PERCENT: 5 %
GFR AFRICAN AMERICAN: >60
GFR NON-AFRICAN AMERICAN: >60
GLUCOSE BLD-MCNC: 90 MG/DL (ref 70–99)
HCT VFR BLD CALC: 41 % (ref 36–48)
HEMOGLOBIN: 13.8 G/DL (ref 12–16)
INR BLD: 1.07 (ref 0.88–1.12)
LYMPHOCYTES ABSOLUTE: 2 K/UL (ref 1–5.1)
LYMPHOCYTES RELATIVE PERCENT: 26.9 %
MAGNESIUM: 2 MG/DL (ref 1.8–2.4)
MCH RBC QN AUTO: 30.9 PG (ref 26–34)
MCHC RBC AUTO-ENTMCNC: 33.6 G/DL (ref 31–36)
MCV RBC AUTO: 91.9 FL (ref 80–100)
MONOCYTES ABSOLUTE: 0.7 K/UL (ref 0–1.3)
MONOCYTES RELATIVE PERCENT: 9.8 %
NEUTROPHILS ABSOLUTE: 4.3 K/UL (ref 1.7–7.7)
NEUTROPHILS RELATIVE PERCENT: 56.9 %
PDW BLD-RTO: 12.9 % (ref 12.4–15.4)
PLATELET # BLD: 315 K/UL (ref 135–450)
PMV BLD AUTO: 7 FL (ref 5–10.5)
POTASSIUM SERPL-SCNC: 4 MMOL/L (ref 3.5–5.1)
PROTHROMBIN TIME: 12.1 SEC (ref 9.9–12.7)
RBC # BLD: 4.46 M/UL (ref 4–5.2)
SODIUM BLD-SCNC: 141 MMOL/L (ref 136–145)
TOTAL PROTEIN: 6.5 G/DL (ref 6.4–8.2)
WBC # BLD: 7.6 K/UL (ref 4–11)

## 2021-12-07 PROCEDURE — 85025 COMPLETE CBC W/AUTO DIFF WBC: CPT

## 2021-12-07 PROCEDURE — 80053 COMPREHEN METABOLIC PANEL: CPT

## 2021-12-07 PROCEDURE — 2580000003 HC RX 258: Performed by: INTERNAL MEDICINE

## 2021-12-07 PROCEDURE — 6370000000 HC RX 637 (ALT 250 FOR IP): Performed by: INTERNAL MEDICINE

## 2021-12-07 PROCEDURE — 94761 N-INVAS EAR/PLS OXIMETRY MLT: CPT

## 2021-12-07 PROCEDURE — 85610 PROTHROMBIN TIME: CPT

## 2021-12-07 PROCEDURE — 36415 COLL VENOUS BLD VENIPUNCTURE: CPT

## 2021-12-07 PROCEDURE — 6360000002 HC RX W HCPCS: Performed by: INTERNAL MEDICINE

## 2021-12-07 PROCEDURE — 1200000000 HC SEMI PRIVATE

## 2021-12-07 PROCEDURE — 83735 ASSAY OF MAGNESIUM: CPT

## 2021-12-07 RX ORDER — ONDANSETRON 2 MG/ML
4 INJECTION INTRAMUSCULAR; INTRAVENOUS EVERY 6 HOURS PRN
Status: DISCONTINUED | OUTPATIENT
Start: 2021-12-07 | End: 2021-12-08 | Stop reason: HOSPADM

## 2021-12-07 RX ADMIN — SODIUM CHLORIDE, PRESERVATIVE FREE 10 ML: 5 INJECTION INTRAVENOUS at 09:14

## 2021-12-07 RX ADMIN — SODIUM CHLORIDE, PRESERVATIVE FREE 10 ML: 5 INJECTION INTRAVENOUS at 21:36

## 2021-12-07 RX ADMIN — ENOXAPARIN SODIUM 40 MG: 100 INJECTION SUBCUTANEOUS at 09:08

## 2021-12-07 RX ADMIN — MONTELUKAST 10 MG: 10 TABLET, FILM COATED ORAL at 21:37

## 2021-12-07 RX ADMIN — ONDANSETRON 4 MG: 4 TABLET, ORALLY DISINTEGRATING ORAL at 09:14

## 2021-12-07 RX ADMIN — ACETYLCYSTEINE 8440 MG: 200 INJECTION, SOLUTION INTRAVENOUS at 14:42

## 2021-12-07 ASSESSMENT — PAIN SCALES - GENERAL
PAINLEVEL_OUTOF10: 0
PAINLEVEL_OUTOF10: 0

## 2021-12-07 NOTE — PLAN OF CARE
change dressings as needed. Will preform skin assessment every shift. Electronically signed by Adam Harry RN on 12/7/2021 at 3:17 PM      Problem: Discharge Planning:  Goal: Patients continuum of care needs are met  Description: Patients continuum of care needs are met  12/7/2021 1516 by Adam Harry RN  Outcome: Ongoing  Note: Assessed patients knowledge of discharge. Will continue to work with patient on discharge planning and answer patient questions. Will consult case management and  as necessary.  Electronically signed by Adam Harry RN on 12/7/2021 at 3:17 PM      Problem: Falls - Risk of:  Goal: Will remain free from falls  Description: Will remain free from falls  12/7/2021 1516 by Adam Harry RN  Outcome: Ongoing

## 2021-12-07 NOTE — PROGRESS NOTES
Hospitalist Progress Note      PCP: No primary care provider on file. Date of Admission: 12/2/2021    Chief Complaint:  N/V    Hospital Course: The patient is a 27 y.o. female who presents to [facility] with main concern of second episode of syncope in the past 2 days without any major trigger. For the past 3 days patient has felt unwell but initially started as just generalized weakness. She has had decreased appetite, nausea and nonbloody oft bilous vomitng. The 2 syncopal episodes were  related to her vomiting and feeling lightheaded. She has not eaten much over the last 2 days. She on further questioning alludes to taking a staggered above normal amount of tylenol over the last few days above 1500mg in a 24 period daily but mostly to help her sleep at night. She denies any previous abdominal issues or liver issues. No h/o BTx, excess alcohol, IVDU, jaundice. She did use Kratom 2 days ago to see if it would help some lower abdominal pain that she seems to chronically have- the pain is of uncertain etiology but has not been confirmed to be entirely of endometriosis nature. She denies other symptoms of dysuria, seizure disorder, b/b incontinence, vision change, focal weakness, dysuria, blood in urine/ stool/sputum/vomit, cough, congestion, sick contacts, SOB, CP, fever, chills. Her tylenol level was <5 but had abn LFTs and poisons board advised to continue NAC till AST/ALT fall <half and INR<1.5 and pt feels well.     Subjective:   Feeling better  No vomiting but nauseated still after breakfast  No diarrhea      Medications:  Reviewed    Infusion Medications    sodium chloride 125 mL/hr at 12/05/21 1122    sodium chloride      sodium chloride       Scheduled Medications    acetylcysteine (ACETADOTE) infusion *third dose*  100 mg/kg IntraVENous Once    pantoprazole  40 mg Oral QAM AC    GI cocktail   Oral Once    enoxaparin  40 mg SubCUTAneous Daily    fluticasone  1 spray Each Nostril Daily    budesonide-formoterol  2 puff Inhalation BID    montelukast  10 mg Oral Nightly    sodium chloride flush  5-40 mL IntraVENous 2 times per day    aspirin  81 mg Oral Daily     PRN Meds: ondansetron, dicyclomine, sodium chloride, albuterol sulfate HFA, sodium chloride flush, sodium chloride, polyethylene glycol, ondansetron **OR** [DISCONTINUED] ondansetron, magnesium sulfate, potassium chloride **OR** potassium alternative oral replacement **OR** potassium chloride, promethazine      Intake/Output Summary (Last 24 hours) at 12/7/2021 1429  Last data filed at 12/6/2021 1911  Gross per 24 hour   Intake 240 ml   Output --   Net 240 ml       Physical Exam Performed:    /62   Pulse 68   Temp 97.6 °F (36.4 °C) (Oral)   Resp 14   Ht 5' 5\" (1.651 m)   Wt 192 lb 10.9 oz (87.4 kg)   LMP 11/11/2021   SpO2 96%   BMI 32.06 kg/m²     General appearance: No apparent distress, appears stated age and cooperative. HEENT: Pupils equal, round, and reactive to light. Conjunctivae/corneas clear. Neck: Supple, with full range of motion. No jugular venous distention. Trachea midline. Respiratory:  Normal respiratory effort. Clear to auscultation, bilaterally without Rales/Wheezes/Rhonchi. Cardiovascular: Regular rate and rhythm with normal S1/S2 without murmurs, rubs or gallops. Abdomen: Soft, non-tender, non-distended with normal bowel sounds. Musculoskeletal: No clubbing, cyanosis or edema bilaterally. Full range of motion without deformity. Skin: Skin color, texture, turgor normal.  No rashes or lesions. Neurologic:  Neurovascularly intact without any focal sensory/motor deficits.  Cranial nerves: II-XII intact, grossly non-focal.  Psychiatric: Alert and oriented, thought content appropriate, normal insight  Capillary Refill: Brisk,3 seconds, normal   Peripheral Pulses: +2 palpable, equal bilaterally       Labs:   Recent Labs     12/05/21  0509 12/06/21  0733 12/07/21  0610   WBC 7.6 7.0 7.6   HGB 14.2 12.7 13.8   HCT 43.2 37.8 41.0    289 315     Recent Labs     12/05/21  0509 12/06/21  0733 12/07/21  0610    142 141   K 3.9 3.9 4.0    108 102   CO2 25 23 27   BUN 8 5* 7   CREATININE 0.8 0.6 0.7   CALCIUM 9.0 8.8 9.3     Recent Labs     12/05/21  0509 12/06/21  0733 12/07/21  0610   * 103* 103*   * 371* 380*   BILITOT 1.0 0.8 0.8   ALKPHOS 152* 131* 136*     Recent Labs     12/05/21  0509 12/06/21  0733 12/07/21  0610   INR 1.07 1.05 1.07     No results for input(s): Patti Ramirez in the last 72 hours. Urinalysis:      Lab Results   Component Value Date    NITRU Negative 12/02/2021    BLOODU Negative 12/02/2021    SPECGRAV 1.006 12/02/2021    GLUCOSEU Negative 12/02/2021       Radiology:  US ABDOMEN COMPLETE   Final Result   Unremarkable abdominal ultrasound. CT HEAD WO CONTRAST   Final Result   No acute intracranial abnormality. XR CHEST PORTABLE   Final Result   No airspace disease by radiograph. CT ABDOMEN PELVIS W IV CONTRAST Additional Contrast? None   Final Result   1. Questionable wall thickening of the gastric antrum given suboptimal   evaluation as described. Correlate with presentation to exclude symptoms of   gastritis. 2. No appendicitis, diverticulitis, or small bowel obstruction. 3. Other findings as described. ASSESSMENT:  1. Abd pain, N and V  2. Acute Hepatitis/Liver failure sec to 3 (Transamnitis improving. Viral hepatitis panel, REBECCA negative  3. Accidental overdose- Tylenol Overdose, 1500 mg/day -staggered  4. Normal QTc     PLAN:  - continue NAC till LFTs are at half the peak, redose today per poison control recommendations  -treat till INR<1.5 and AST and ALT decreases to half the peak  -daily LFTs + INR  -GI recs noted, can follow-up as outpatient for EGD  -continue PO Protonix for PUD        DVT Prophylaxis: lovenox  Diet: ADULT DIET;  Clear Liquid  Code Status: Full Code     PT/OT Eval Status: not ordered     Dispo - Keep in- inpatient       Raysa Jones MD

## 2021-12-07 NOTE — PROGRESS NOTES
Received call from poison control. Updated on patient status. Voiced concern over ALT increasing to 380 from 371 yesterday. Recommend giving another dose of antidote and rechecking labs. Dr. Kalyani Bowman made aware.

## 2021-12-07 NOTE — PROGRESS NOTES
Patient resting quietly in bed. Alert and oriented. Denies pain. Breakfast eaten. PO Zofran given for nausea. Patient ambulates independently in room. Call light and belongings within reach. Denies any further needs at this time. Patient able to make needs known. Will continue to monitor.      Electronically signed by Cherelle Orourke RN on 12/7/2021 at 3:21 PM

## 2021-12-07 NOTE — PLAN OF CARE
Problem: Pain:  Goal: Pain level will decrease  Description: Pain level will decrease  Outcome: Ongoing  Note: Educated patient on pain management. Will assess patients pain level per unit protocol, and provide pain management measures as needed. Goal: Control of acute pain  Description: Control of acute pain  Outcome: Ongoing  Note: Patient educated on acute pain. Taught patient to use call light to ask for pain medication. PRN pain medication given for acute pain. Will continue to monitor pain per unit protocol. Goal: Control of chronic pain  Description: Control of chronic pain  Outcome: Ongoing  Note: Patient educated on chronic pain. Taught patient to use call light to ask for pain medication. Will continue to monitor pain per unit protocol. Goal: Patient's pain/discomfort is manageable  Description: Patient's pain/discomfort is manageable  Outcome: Ongoing  Note: Educated patient on pain management. Will assess patients pain level per unit protocol, and provide pain management measures as needed. Problem: Infection:  Goal: Will remain free from infection  Description: Will remain free from infection  Outcome: Ongoing     Problem: Safety:  Goal: Free from accidental physical injury  Description: Free from accidental physical injury  Outcome: Ongoing  Note: Pt assessed for fall risk and fall precautions put into place. Bed in lowest position and wheels locked, call light within reach. Nonskid footwear in place. Patient educated on appropriate method of transfer and to call for assistance. Goal: Free from intentional harm  Description: Free from intentional harm  Outcome: Ongoing  Note: Patient remains free from intentional harm, no signs or symptoms of intention to harm noted. Will continue to monitor patient throughout shift.         Problem: Daily Care:  Goal: Daily care needs are met  Description: Daily care needs are met  Outcome: Ongoing  Note: Patient assessed to determine ability to perform daily needs and ADLs. Patient assisted with any daily needs that were not able to be met individually by patient. [place] encouraged, although patient educated to ask for assistance when needed. Will continue to monitor and assist patient with meeting daily care needs as needed. Problem: Skin Integrity:  Goal: Skin integrity will stabilize  Description: Skin integrity will stabilize  Outcome: Ongoing  Note: Will monitor skin and mucous members. Will turn patient every 2 hours, monitor for friction and sheering, and change dressings as needed. Will preform skin assessment every shift. Problem: Discharge Planning:  Goal: Patients continuum of care needs are met  Description: Patients continuum of care needs are met  Outcome: Ongoing  Note: Assessed patients knowledge of discharge. Will continue to work with patient on discharge planning and answer patient questions. Will consult case management and  as necessary. Problem: Falls - Risk of:  Goal: Will remain free from falls  Description: Will remain free from falls  Outcome: Ongoing  Note: Patient educated on fall prevention. Call light is within reach, bed locked in lowest position, personal items within reach, and bed alarm is on. Will round on patient per unit guidelines. Goal: Absence of physical injury  Description: Absence of physical injury  Outcome: Ongoing  Note: Pt assessed for fall risk and fall precautions put into place. Bed in lowest position and wheels locked, call light within reach. Nonskid footwear in place. Patient educated on appropriate method of transfer and to call for assistance.

## 2021-12-07 NOTE — PROGRESS NOTES
Nausea has improved. Patient able to tolerate lunch. New bag of Acetylcysteine infusing per MD order.

## 2021-12-08 VITALS
BODY MASS INDEX: 31.7 KG/M2 | HEART RATE: 69 BPM | RESPIRATION RATE: 22 BRPM | WEIGHT: 190.26 LBS | TEMPERATURE: 97.4 F | SYSTOLIC BLOOD PRESSURE: 92 MMHG | OXYGEN SATURATION: 99 % | DIASTOLIC BLOOD PRESSURE: 62 MMHG | HEIGHT: 65 IN

## 2021-12-08 LAB
A/G RATIO: 1.5 (ref 1.1–2.2)
ALBUMIN SERPL-MCNC: 4.3 G/DL (ref 3.4–5)
ALP BLD-CCNC: 139 U/L (ref 40–129)
ALT SERPL-CCNC: 345 U/L (ref 10–40)
ANION GAP SERPL CALCULATED.3IONS-SCNC: 13 MMOL/L (ref 3–16)
ANTI-JO1 IGG: <0.2 AI (ref 0–0.9)
AST SERPL-CCNC: 92 U/L (ref 15–37)
BASOPHILS ABSOLUTE: 0.1 K/UL (ref 0–0.2)
BASOPHILS RELATIVE PERCENT: 0.8 %
BILIRUB SERPL-MCNC: 0.6 MG/DL (ref 0–1)
BUN BLDV-MCNC: 7 MG/DL (ref 7–20)
CALCIUM SERPL-MCNC: 9.8 MG/DL (ref 8.3–10.6)
CHLORIDE BLD-SCNC: 101 MMOL/L (ref 99–110)
CO2: 24 MMOL/L (ref 21–32)
CREAT SERPL-MCNC: 0.6 MG/DL (ref 0.6–1.1)
EOSINOPHILS ABSOLUTE: 0.4 K/UL (ref 0–0.6)
EOSINOPHILS RELATIVE PERCENT: 4.2 %
GFR AFRICAN AMERICAN: >60
GFR NON-AFRICAN AMERICAN: >60
GLUCOSE BLD-MCNC: 86 MG/DL (ref 70–99)
HCT VFR BLD CALC: 43 % (ref 36–48)
HEMOGLOBIN: 14.3 G/DL (ref 12–16)
LYMPHOCYTES ABSOLUTE: 2.4 K/UL (ref 1–5.1)
LYMPHOCYTES RELATIVE PERCENT: 27.1 %
MAGNESIUM: 2.2 MG/DL (ref 1.8–2.4)
MCH RBC QN AUTO: 30.7 PG (ref 26–34)
MCHC RBC AUTO-ENTMCNC: 33.3 G/DL (ref 31–36)
MCV RBC AUTO: 92 FL (ref 80–100)
MONOCYTES ABSOLUTE: 0.8 K/UL (ref 0–1.3)
MONOCYTES RELATIVE PERCENT: 9.1 %
NEUTROPHILS ABSOLUTE: 5.2 K/UL (ref 1.7–7.7)
NEUTROPHILS RELATIVE PERCENT: 58.8 %
PDW BLD-RTO: 12.7 % (ref 12.4–15.4)
PLATELET # BLD: 324 K/UL (ref 135–450)
PMV BLD AUTO: 7.4 FL (ref 5–10.5)
POTASSIUM SERPL-SCNC: 3.8 MMOL/L (ref 3.5–5.1)
RBC # BLD: 4.68 M/UL (ref 4–5.2)
SODIUM BLD-SCNC: 138 MMOL/L (ref 136–145)
T4 FREE: 1.2 NG/DL (ref 0.9–1.8)
TOTAL CK: 102 U/L (ref 26–192)
TOTAL PROTEIN: 7.1 G/DL (ref 6.4–8.2)
TSH SERPL DL<=0.05 MIU/L-ACNC: 2.54 UIU/ML (ref 0.27–4.2)
WBC # BLD: 8.8 K/UL (ref 4–11)

## 2021-12-08 PROCEDURE — 94760 N-INVAS EAR/PLS OXIMETRY 1: CPT

## 2021-12-08 PROCEDURE — 86235 NUCLEAR ANTIGEN ANTIBODY: CPT

## 2021-12-08 PROCEDURE — 85025 COMPLETE CBC W/AUTO DIFF WBC: CPT

## 2021-12-08 PROCEDURE — 82085 ASSAY OF ALDOLASE: CPT

## 2021-12-08 PROCEDURE — 82550 ASSAY OF CK (CPK): CPT

## 2021-12-08 PROCEDURE — 36415 COLL VENOUS BLD VENIPUNCTURE: CPT

## 2021-12-08 PROCEDURE — 84439 ASSAY OF FREE THYROXINE: CPT

## 2021-12-08 PROCEDURE — 84443 ASSAY THYROID STIM HORMONE: CPT

## 2021-12-08 PROCEDURE — 80053 COMPREHEN METABOLIC PANEL: CPT

## 2021-12-08 PROCEDURE — 2580000003 HC RX 258: Performed by: INTERNAL MEDICINE

## 2021-12-08 PROCEDURE — 6370000000 HC RX 637 (ALT 250 FOR IP): Performed by: INTERNAL MEDICINE

## 2021-12-08 PROCEDURE — 83735 ASSAY OF MAGNESIUM: CPT

## 2021-12-08 RX ORDER — PANTOPRAZOLE SODIUM 40 MG/1
40 TABLET, DELAYED RELEASE ORAL
Qty: 30 TABLET | Refills: 3 | Status: SHIPPED | OUTPATIENT
Start: 2021-12-09 | End: 2022-01-19 | Stop reason: SDUPTHER

## 2021-12-08 RX ADMIN — SODIUM CHLORIDE, PRESERVATIVE FREE 10 ML: 5 INJECTION INTRAVENOUS at 08:36

## 2021-12-08 RX ADMIN — PANTOPRAZOLE SODIUM 40 MG: 40 TABLET, DELAYED RELEASE ORAL at 06:33

## 2021-12-08 ASSESSMENT — PAIN SCALES - GENERAL
PAINLEVEL_OUTOF10: 0

## 2021-12-08 NOTE — PROGRESS NOTES
Data- discharge order received, pt verbalized agreement to discharge, disposition to previous residence, no needs for HHC/DME. Action- discharge instructions prepared and given to patient, pt verbalized understanding. Medication information packet given r/t NEW and/or CHANGED prescriptions emphasizing name/purpose/side effects, pt verbalized understanding. Discharge instruction summary: Diet- general, Activity- as tolerated, Primary Care Physician as follows: No primary care provider on file. None f/u appointment scheduled for December 20th, immunizations reviewed and up to date, prescription medications filled at preferred pharmacy. Response- Pt belongings gathered, IV removed. Disposition is home (no HHC/DME needs), [facility] with belongings[facility], no complications. Spouse transporting home.

## 2021-12-08 NOTE — DISCHARGE SUMMARY
Hospital Medicine Discharge Summary    Patient ID: Robby Daniel      Patient's PCP: No primary care provider on file. Admit Date: 12/2/2021     Discharge Date:   12/8/2021    Admitting Physician: Odalys London DO     Discharge Physician: Jorden Delgado MD     Discharge Diagnoses: Active Hospital Problems    Diagnosis Date Noted    Hyperbilirubinemia [E80.6] 12/03/2021    Elevated LFTs [R79.89] 12/03/2021    Intractable nausea and vomiting [R11.2] 12/03/2021    Syncope [R55] 12/03/2021    Headache [R51.9] 12/03/2021    Syncope and collapse [R55] 12/03/2021       The patient was seen and examined on day of discharge and this discharge summary is in conjunction with any daily progress note from day of discharge. Hospital Course: The patient is a 27 y.o. female who presents to [facility] with main concern of second episode of syncope in the past 2 days without any major trigger. For the past 3 days patient has felt unwell but initially started as just generalized weakness. She then started having decreased appetite and recurrent episodes of nausea and nonbloody although bilous vomitng and both syncope episodes were related to her vomiting and feeling lightheaded. She has not eaten much over the last 2 days. She denies to me taking excessive tylenol but notes her last use was almost a week ago. She does admit in a typical week she may use upwards of 1500mg in a 24 period daily but mostly to help her sleep at night. She denies any previous abdominal issues or liver issues. She did use Kratom 2 days ago to see if it would help some lower abdominal pain that she seems to chronically have- the pain is of uncertain etiology but has not been confirmed to be entirely of endometriosis nature.  She denies other symptoms of dysuria, seizure disorder, b/b incontinence, vision change, focal weakness, dysuria, blood in urine/ stool/sputum/vomit, cough, congestion, sick contacts, SOB, CP, fever, chills. 1.  Abd pain, N and V  2. Acute Hepatitis/Liver failure sec to 3 (Transamnitis improving. Viral hepatitis panel, REBECCA negative  3.  Accidental overdose- Tylenol Overdose, 1500 mg/day -staggered  4. Normal QTc     PLAN:  - s/p multiple doses of NAC with improvement in LFTs  - repeat CMP in 1 week  - follow-up with GI at discharge  - serologies pending at discharge  - follow-up with GI and PCP for further workup and management  -daily LFTs + INR  -GI recs noted, can follow-up as outpatient for EGD  -continue PO Protonix for PUD at discharge      Exam:     BP 92/62   Pulse 69   Temp 97.4 °F (36.3 °C) (Oral)   Resp 22   Ht 5' 5\" (1.651 m)   Wt 190 lb 4.1 oz (86.3 kg)   LMP 11/11/2021   SpO2 99%   BMI 31.66 kg/m²       General appearance:  No apparent distress, appears stated age and cooperative. HEENT:  Normal cephalic, atraumatic without obvious deformity. Pupils equal, round, and reactive to light. Extra ocular muscles intact. Conjunctivae/corneas clear. Neck: Supple, with full range of motion. No jugular venous distention. Trachea midline. Respiratory:  Normal respiratory effort. Clear to auscultation, bilaterally without Rales/Wheezes/Rhonchi. Cardiovascular:  Regular rate and rhythm with normal S1/S2 without murmurs, rubs or gallops. Abdomen: Soft, non-tender, non-distended with normal bowel sounds. Musculoskeletal:  No clubbing, cyanosis or edema bilaterally. Full range of motion without deformity. Skin: Skin color, texture, turgor normal.  No rashes or lesions. Neurologic:  Neurovascularly intact without any focal sensory/motor deficits. Cranial nerves: II-XII intact, grossly non-focal.  Psychiatric:  Alert and oriented, thought content appropriate, normal insight  Capillary Refill: Brisk,< 3 seconds   Peripheral Pulses: +2 palpable, equal bilaterally       Labs:  For convenience and continuity at follow-up the following most recent labs are provided:      CBC:    Lab Results Component Value Date    WBC 8.8 12/08/2021    HGB 14.3 12/08/2021    HCT 43.0 12/08/2021     12/08/2021       Renal:    Lab Results   Component Value Date     12/08/2021    K 3.8 12/08/2021    K 4.2 12/02/2021     12/08/2021    CO2 24 12/08/2021    BUN 7 12/08/2021    CREATININE 0.6 12/08/2021    CALCIUM 9.8 12/08/2021         Significant Diagnostic Studies    Radiology:   US ABDOMEN COMPLETE   Final Result   Unremarkable abdominal ultrasound. CT HEAD WO CONTRAST   Final Result   No acute intracranial abnormality. XR CHEST PORTABLE   Final Result   No airspace disease by radiograph. CT ABDOMEN PELVIS W IV CONTRAST Additional Contrast? None   Final Result   1. Questionable wall thickening of the gastric antrum given suboptimal   evaluation as described. Correlate with presentation to exclude symptoms of   gastritis. 2. No appendicitis, diverticulitis, or small bowel obstruction. 3. Other findings as described. Consults:     IP CONSULT TO HOSPITALIST  IP CONSULT TO GI    Disposition:  home     Condition at Discharge: Stable    Discharge Instructions/Follow-up:  meds as prescribed, follow-up with PCP, GI    Code Status:  Full Code     Activity: activity as tolerated    Diet: regular diet      Discharge Medications:     Current Discharge Medication List           Details   pantoprazole (PROTONIX) 40 MG tablet Take 1 tablet by mouth every morning (before breakfast)  Qty: 30 tablet, Refills: 3              Details   norethindrone (MICRONOR) 0.35 MG tablet Take 1 tablet by mouth daily      tiZANidine (ZANAFLEX) 4 MG tablet Take 4 mg by mouth 3 times daily      albuterol sulfate  (90 Base) MCG/ACT inhaler Inhale 2 puffs into the lungs every 6 hours as needed for Wheezing             Time Spent on discharge is more than 30 minutes in the examination, evaluation, counseling and review of medications and discharge plan.       Signed:    Jorden Delgado MD   12/8/2021      Thank you No primary care provider on file. for the opportunity to be involved in this patient's care. If you have any questions or concerns please feel free to contact me at [phone].

## 2021-12-08 NOTE — PROGRESS NOTES
Patient resting quietly in bed. Denies pain. Denies nausea. Breakfast eaten. VSS. Acetylcysteine infusion complete. Patient ambulates independently. Bed alarm on. Call light and belongings within reach. Will continue to monitor.      Electronically signed by Kait Norwood RN on 12/8/2021 at 12:26 PM

## 2021-12-08 NOTE — PLAN OF CARE
Problem: Pain:  Goal: Pain level will decrease  Description: Pain level will decrease  12/8/2021 1224 by Maria M Tracey RN  Outcome: Ongoing  Note: Educated patient on pain management. Will assess patients pain level per unit protocol, and provide pain management measures as needed. Electronically signed by Maria M Tracey RN on 12/8/2021 at 12:24 PM      Problem: Infection:  Goal: Will remain free from infection  Description: Will remain free from infection  12/8/2021 1224 by Maria M Tracey RN  Outcome: Ongoing     Problem: Safety:  Goal: Free from accidental physical injury  Description: Free from accidental physical injury  12/8/2021 1224 by Maria M Tracey RN  Outcome: Ongoing  Note: Bed in lowest position, wheels locked, bed/chair exit alarm in place, call light within reach, and non skid footwear on. Walkway free of clutter. Pt alert and oriented and able to make needs known. Pt educated to use call light when needing to get up, and pt utilizes call light to make needs known. Will continue to monitor. Electronically signed by Maria M Tracey RN on 12/8/2021 at 12:24 PM      Problem: Daily Care:  Goal: Daily care needs are met  Description: Daily care needs are met  12/8/2021 1224 by Maria M Tracey RN  Outcome: Ongoing  Note: Patient assessed to determine ability to perform daily needs and ADLs. Patient assisted with any daily needs that were not able to be met individually by patient. [place] encouraged, although patient educated to ask for assistance when needed. Will continue to monitor and assist patient with meeting daily care needs as needed. Electronically signed by Maria M Tracey RN on 12/8/2021 at 12:24 PM      Problem: Skin Integrity:  Goal: Skin integrity will stabilize  Description: Skin integrity will stabilize  12/8/2021 1224 by Maria M Tracey RN  Outcome: Ongoing  Note: Will monitor skin and mucous membranes.   Will turn patient every 2 hours, monitor for friction and sheering, and change dressings as needed. Will preform skin assessment every shift. Electronically signed by Adam Harry RN on 12/8/2021 at 12:24 PM      Problem: Discharge Planning:  Goal: Patients continuum of care needs are met  Description: Patients continuum of care needs are met  12/8/2021 1224 by Adam Harry RN  Outcome: Ongoing     Problem: Falls - Risk of:  Goal: Will remain free from falls  Description: Will remain free from falls  12/8/2021 1224 by Adam Harry RN  Outcome: Ongoing  Note: Fall risk assessment completed. Fall precautions in place. Call light within reach. Pt educated on calling for assistance before getting up. Walkway free of clutter. Will continue to monitor.   Electronically signed by Adam Harry RN on 12/8/2021 at 12:24 PM

## 2021-12-08 NOTE — PROGRESS NOTES
Patient resting in bed with watching tv. No complaints or signs of distress noted. Call light and phone within reach.  Writer will continue to monitor

## 2021-12-08 NOTE — PLAN OF CARE
Problem: Pain:  Goal: Pain level will decrease  Description: Pain level will decrease  12/7/2021 2240 by Marisol Spencer RN  Outcome: Ongoing  Note: Pt pain level will decrease  12/7/2021 1516 by Butch Rajput RN  Outcome: Ongoing  Note: Educated patient on pain management. Will assess patients pain level per unit protocol, and provide pain management measures as needed. Electronically signed by Butch Rajput RN on 12/7/2021 at 3:17 PM   Goal: Control of acute pain  Description: Control of acute pain  Outcome: Ongoing  Goal: Control of chronic pain  Description: Control of chronic pain  Outcome: Ongoing  Goal: Patient's pain/discomfort is manageable  Description: Patient's pain/discomfort is manageable  Outcome: Ongoing     Problem: Infection:  Goal: Will remain free from infection  Description: Will remain free from infection  12/7/2021 2240 by Marisol Spencer RN  Outcome: Ongoing  12/7/2021 1516 by Butch Rajput RN  Outcome: Ongoing     Problem: Safety:  Goal: Free from accidental physical injury  Description: Free from accidental physical injury  12/7/2021 2240 by Marisol Spencer RN  Outcome: Ongoing  12/7/2021 1516 by Butch Rajput RN  Outcome: Ongoing  Note: Bed in lowest position, wheels locked, bed/chair exit alarm in place, call light within reach, and non skid footwear on. Walkway free of clutter. Pt alert and oriented and able to make needs known. Pt educated to use call light when needing to get up, and pt utilizes call light to make needs known. Will continue to monitor. Electronically signed by Butch Rajput RN on 12/7/2021 at 3:17 PM   Goal: Free from intentional harm  Description: Free from intentional harm  Outcome: Ongoing     Problem: Daily Care:  Goal: Daily care needs are met  Description: Daily care needs are met  12/7/2021 2240 by Marisol Spencer RN  Outcome: Ongoing  Note: Pt daily care needs will be met.   12/7/2021 1516 by Butch Rajput RN  Outcome: Ongoing  Note: Patient assessed to determine ability to perform daily needs and ADLs. Patient assisted with any daily needs that were not able to be met individually by patient. [place] encouraged, although patient educated to ask for assistance when needed. Will continue to monitor and assist patient with meeting daily care needs as needed. Electronically signed by Georgie Avalos RN on 12/7/2021 at 3:17 PM      Problem: Skin Integrity:  Goal: Skin integrity will stabilize  Description: Skin integrity will stabilize  12/7/2021 2240 by Porfirio Crum RN  Outcome: Ongoing  12/7/2021 1516 by Georgie Avalos RN  Outcome: Ongoing  Note: Will monitor skin and mucous membranes. Will turn patient every 2 hours, monitor for friction and sheering, and change dressings as needed. Will preform skin assessment every shift. Electronically signed by Georgie Avalos RN on 12/7/2021 at 3:17 PM      Problem: Discharge Planning:  Goal: Patients continuum of care needs are met  Description: Patients continuum of care needs are met  12/7/2021 2240 by Porfirio Crum RN  Outcome: Ongoing  12/7/2021 1516 by Georgie Avalos RN  Outcome: Ongoing  Note: Assessed patients knowledge of discharge. Will continue to work with patient on discharge planning and answer patient questions. Will consult case management and  as necessary. Electronically signed by Georgie Avalos RN on 12/7/2021 at 3:17 PM      Problem: Falls - Risk of:  Goal: Will remain free from falls  Description: Will remain free from falls  12/7/2021 2240 by Porfirio Crum RN  Outcome: Ongoing  Note: Pt will remain free from falls.   12/7/2021 1516 by Georgie Avalos RN  Outcome: Ongoing  Goal: Absence of physical injury  Description: Absence of physical injury  Outcome: Ongoing

## 2021-12-08 NOTE — PROGRESS NOTES
Patient resting in bed with  at bedside. No complaints or signs of distress noted. Call light within reach. Writer will continue to monitor.

## 2021-12-09 LAB — F-ACTIN AB IGG: 13 UNITS (ref 0–19)

## 2021-12-11 LAB — ALDOLASE: 12.3 U/L (ref 1.5–8.1)

## 2021-12-20 ENCOUNTER — OFFICE VISIT (OUTPATIENT)
Dept: PRIMARY CARE CLINIC | Age: 26
End: 2021-12-20
Payer: COMMERCIAL

## 2021-12-20 VITALS
HEART RATE: 87 BPM | SYSTOLIC BLOOD PRESSURE: 118 MMHG | WEIGHT: 190.4 LBS | DIASTOLIC BLOOD PRESSURE: 74 MMHG | OXYGEN SATURATION: 99 % | HEIGHT: 65 IN | BODY MASS INDEX: 31.72 KG/M2

## 2021-12-20 DIAGNOSIS — Z13.29 SCREENING FOR THYROID DISORDER: ICD-10-CM

## 2021-12-20 DIAGNOSIS — E55.9 VITAMIN D DEFICIENCY: ICD-10-CM

## 2021-12-20 DIAGNOSIS — R79.89 ELEVATED LFTS: ICD-10-CM

## 2021-12-20 DIAGNOSIS — R11.2 INTRACTABLE NAUSEA AND VOMITING: Primary | ICD-10-CM

## 2021-12-20 DIAGNOSIS — Z13.1 SCREENING FOR DIABETES MELLITUS: ICD-10-CM

## 2021-12-20 DIAGNOSIS — Z13.220 SCREENING FOR LIPID DISORDERS: ICD-10-CM

## 2021-12-20 DIAGNOSIS — Z11.4 SCREENING FOR HIV (HUMAN IMMUNODEFICIENCY VIRUS): ICD-10-CM

## 2021-12-20 DIAGNOSIS — Z01.84 ENCOUNTER FOR ANTIBODY RESPONSE EXAMINATION: ICD-10-CM

## 2021-12-20 DIAGNOSIS — Z13.228 ENCOUNTER FOR SCREENING FOR OTHER METABOLIC DISORDERS: ICD-10-CM

## 2021-12-20 PROBLEM — N92.6 IRREGULAR MENSTRUAL CYCLE: Status: ACTIVE | Noted: 2021-12-01

## 2021-12-20 PROBLEM — N80.9 ENDOMETRIOSIS: Status: ACTIVE | Noted: 2021-12-01

## 2021-12-20 PROCEDURE — 99214 OFFICE O/P EST MOD 30 MIN: CPT | Performed by: NURSE PRACTITIONER

## 2021-12-20 PROCEDURE — 1111F DSCHRG MED/CURRENT MED MERGE: CPT | Performed by: NURSE PRACTITIONER

## 2021-12-20 ASSESSMENT — PATIENT HEALTH QUESTIONNAIRE - PHQ9
2. FEELING DOWN, DEPRESSED OR HOPELESS: 0
1. LITTLE INTEREST OR PLEASURE IN DOING THINGS: 0
SUM OF ALL RESPONSES TO PHQ QUESTIONS 1-9: 0
SUM OF ALL RESPONSES TO PHQ9 QUESTIONS 1 & 2: 0

## 2021-12-20 NOTE — PROGRESS NOTES
Post-Discharge Transitional Care Management Services or Hospital Follow Up      [id number]   YOB: 1995    Date of Office Visit:  12/20/2021  Date of Hospital Admission: 12/2/21  Date of Hospital Discharge: 12/8/21  Risk of hospital readmission (high >=14%. Medium >=10%) :Readmission Risk Score: 5.5 ( )      Care management risk score Rising risk (score 2-5) and Complex Care (Scores >=6): 0     Non face to face  following discharge, date last encounter closed (first attempt may have been earlier): *No documented post hospital discharge outreach found in the last 14 days    Call initiated 2 business days of discharge: *No response recorded in the last 14 days    Patient Active Problem List   Diagnosis    Gastroesophageal reflux disease without esophagitis    Hyperbilirubinemia    Elevated LFTs    Intractable nausea and vomiting    Syncope    Headache    Syncope and collapse    Endometriosis    Irregular menstrual cycle       Allergies   Allergen Reactions    Other Anaphylaxis    Shrimp Flavor        Medications listed as ordered at the time of discharge from hospital     Medication List          Accurate as of December 20, 2021 11:59 PM. If you have any questions, ask your nurse or doctor. CONTINUE taking these medications    albuterol sulfate  (90 Base) MCG/ACT inhaler     norethindrone 0.35 MG tablet  Commonly known as: MICRONOR     pantoprazole 40 MG tablet  Commonly known as: PROTONIX  Take 1 tablet by mouth every morning (before breakfast)     tiZANidine 4 MG tablet  Commonly known as: ZANAFLEX              Medications marked \"taking\" at this time  No outpatient medications have been marked as taking for the 12/20/21 encounter (Office Visit) with ERASMO Luna CNP.         Medications patient taking as of now reconciled against medications ordered at time of hospital discharge: Yes    Chief Complaint   Patient presents with   [address]      Fabrizio Urrutia; x2 years    [facility] f/u; elevated LFTs; vomiting       History of Present illness - Follow up of Hospital diagnosis(es): 28 yo female comes to clinic for ED follow up for syncope, nausea, and vomiting. Patient states that she had visited the ER on 12/2 for for dizziness of 2 days. She progressed with generalized weakness, decreased appetite, nausea, and bilious vomiting. She states that she has not eaten or drank much fluid. She was admitted for 6 days. . Denies URI symptoms. She had been using ~1500 mg of Tylenol in 24 hours and admits to using Kratom recently. She does also endorse chronic lower abdominal pain CT of abdomen showed suspected gastritis. She has not followed up with a GYN. She denies fever, chills, dysuria, vision change, hematuria. She was discharged to home with referral to Dr. Erendira Montgomery on 1/20 and Protonix. She denies SOB or chest pain. Denies any other issues at this time. Inpatient course: Discharge summary reviewed- see chart. Interval history/Current status:Improved    A comprehensive review of systems was negative except for what was noted in the HPI. Vitals:    12/20/21 0844   BP: 118/74   Pulse: 87   SpO2: 99%   Weight: 190 lb 6.4 oz (86.4 kg)   Height: 5' 5\" (1.651 m)     Body mass index is 31.68 kg/m².    Wt Readings from Last 3 Encounters:   12/20/21 190 lb 6.4 oz (86.4 kg)   12/08/21 190 lb 4.1 oz (86.3 kg)   11/14/20 190 lb 7.6 oz (86.4 kg)     BP Readings from Last 3 Encounters:   12/20/21 118/74   12/08/21 92/62   11/14/20 112/79        Physical Exam:  General Appearance: alert and oriented to person, place and time, well developed and well- nourished, in no acute distress  Skin: warm and dry, no rash or erythema  Head: normocephalic and atraumatic  Eyes: pupils equal, round, and reactive to light, extraocular eye movements intact, conjunctivae normal  ENT: tympanic membrane, external ear and ear canal normal bilaterally, nose without deformity, nasal mucosa and turbinates normal without polyps  Neck: supple and non-tender without mass, no thyromegaly or thyroid nodules, no cervical lymphadenopathy  Pulmonary/Chest: clear to auscultation bilaterally- no wheezes, rales or rhonchi, normal air movement, no respiratory distress  Cardiovascular: normal rate, regular rhythm, normal S1 and S2, no murmurs, rubs, clicks, or gallops, distal pulses intact, no carotid bruits  Abdomen: soft, non-tender, non-distended, normal bowel sounds, no masses or organomegaly  Extremities: no cyanosis, clubbing or edema  Musculoskeletal: normal range of motion, no joint swelling, deformity or tenderness  Neurologic: reflexes normal and symmetric, no cranial nerve deficit, gait, coordination and speech normal    Assessment/Plan:  1. Intractable nausea and vomiting    - SC DISCHARGE MEDS RECONCILED W/ CURRENT OUTPATIENT MED LIST    2. Elevated LFTs    - Comprehensive Metabolic Panel; Future  - ALKALINE PHOSPHATASE, ISOENZYMES; Future  - PTH, INTACT; Future  - CALCIUM IONIZED SERUM; Future  - SC DISCHARGE MEDS RECONCILED W/ CURRENT OUTPATIENT MED LIST    3. Encounter for screening for other metabolic disorders    - CBC Auto Differential; Future    5. Screening for diabetes mellitus    - Hemoglobin A1C; Future    6. Screening for thyroid disorder    - TSH with Reflex; Future    7. Encounter for antibody response examination    - Varicella-Zoster Virus (VZV) Antibodies IgG & IgM; Future    8. Screening for lipid disorders    - Lipid Panel; Future    9. Vitamin D deficiency    - Vitamin D 25 Hydroxy; Future    10. Screening for HIV (human immunodeficiency virus)    - HIV Screen;  Future        Medical Decision Making: moderate complexity

## 2021-12-20 NOTE — PATIENT INSTRUCTIONS
Patient Education        Nausea and Vomiting: Care Instructions  Your Care Instructions     When you are nauseated, you may feel weak and sweaty and notice a lot of saliva in your mouth. Nausea often leads to vomiting. Most of the time you do not need to worry about nausea and vomiting, but they can be signs of other illnesses. Two common causes of nausea and vomiting are stomach flu and food poisoning. Nausea and vomiting from viral stomach flu will usually start to improve within 24 hours. Nausea and vomiting from food poisoning may last from 12 to 48 hours. The doctor has checked you carefully, but problems can develop later. If you notice any problems or new symptoms, get medical treatment right away. Follow-up care is a key part of your treatment and safety. Be sure to make and go to all appointments, and call your doctor if you are having problems. It's also a good idea to know your test results and keep a list of the medicines you take. How can you care for yourself at home? · To prevent dehydration, drink plenty of fluids. Choose water and other clear liquids until you feel better. If you have kidney, heart, or liver disease and have to limit fluids, talk with your doctor before you increase the amount of fluids you drink. · Rest in bed until you feel better. · When you are able to eat, try clear soups, mild foods, and liquids until all symptoms are gone for 12 to 48 hours. Other good choices include dry toast, crackers, cooked cereal, and gelatin dessert, such as Jell-O. When should you call for help? Call 911 anytime you think you may need emergency care. For example, call if:    · You passed out (lost consciousness). Call your doctor now or seek immediate medical care if:    · You have symptoms of dehydration, such as:  ? Dry eyes and a dry mouth. ? Passing only a little urine. ? Feeling thirstier than usual.     · You have new or worsening belly pain.     · You have a new or higher fever.   · You vomit blood or what looks like coffee grounds. Watch closely for changes in your health, and be sure to contact your doctor if:    · You have ongoing nausea and vomiting.     · Your vomiting is getting worse.     · Your vomiting lasts longer than 2 days.     · You are not getting better as expected. Where can you learn more? Go to https://chpepiceweb.Addashop. org and sign in to your Yagomart account. Enter [id number] in the TaxiPixi box to learn more about \"Nausea and Vomiting: Care Instructions. \"     If you do not have an account, please click on the \"Sign Up Now\" link. Current as of: July 1, 2021               Content Version: 13.0  © 2006-2021 Healthwise, Incorporated. Care instructions adapted under license by [facility] ([facility]). If you have questions about a medical condition or this instruction, always ask your healthcare professional. [address] any warranty or liability for your use of this information.

## 2022-01-13 DIAGNOSIS — Z13.220 SCREENING FOR LIPID DISORDERS: ICD-10-CM

## 2022-01-13 DIAGNOSIS — Z13.1 SCREENING FOR DIABETES MELLITUS: ICD-10-CM

## 2022-01-13 DIAGNOSIS — Z11.4 SCREENING FOR HIV (HUMAN IMMUNODEFICIENCY VIRUS): ICD-10-CM

## 2022-01-13 DIAGNOSIS — Z01.84 ENCOUNTER FOR ANTIBODY RESPONSE EXAMINATION: ICD-10-CM

## 2022-01-13 DIAGNOSIS — Z13.29 SCREENING FOR THYROID DISORDER: ICD-10-CM

## 2022-01-13 DIAGNOSIS — R79.89 ELEVATED LFTS: ICD-10-CM

## 2022-01-13 DIAGNOSIS — Z13.228 ENCOUNTER FOR SCREENING FOR OTHER METABOLIC DISORDERS: ICD-10-CM

## 2022-01-13 DIAGNOSIS — E55.9 VITAMIN D DEFICIENCY: ICD-10-CM

## 2022-01-13 LAB
A/G RATIO: 1.8 (ref 1.1–2.2)
ALBUMIN SERPL-MCNC: 4.6 G/DL (ref 3.4–5)
ALP BLD-CCNC: 55 U/L (ref 40–129)
ALT SERPL-CCNC: 16 U/L (ref 10–40)
ANION GAP SERPL CALCULATED.3IONS-SCNC: 12 MMOL/L (ref 3–16)
AST SERPL-CCNC: 17 U/L (ref 15–37)
BASOPHILS ABSOLUTE: 0 K/UL (ref 0–0.2)
BASOPHILS RELATIVE PERCENT: 0.4 %
BILIRUB SERPL-MCNC: 0.8 MG/DL (ref 0–1)
BUN BLDV-MCNC: 9 MG/DL (ref 7–20)
CALCIUM SERPL-MCNC: 9.8 MG/DL (ref 8.3–10.6)
CHLORIDE BLD-SCNC: 98 MMOL/L (ref 99–110)
CHOLESTEROL, TOTAL: 163 MG/DL (ref 0–199)
CO2: 24 MMOL/L (ref 21–32)
CREAT SERPL-MCNC: 0.8 MG/DL (ref 0.6–1.1)
EOSINOPHILS ABSOLUTE: 0.1 K/UL (ref 0–0.6)
EOSINOPHILS RELATIVE PERCENT: 0.6 %
GFR AFRICAN AMERICAN: >60
GFR NON-AFRICAN AMERICAN: >60
GLUCOSE BLD-MCNC: 81 MG/DL (ref 70–99)
HCT VFR BLD CALC: 45.4 % (ref 36–48)
HDLC SERPL-MCNC: 51 MG/DL (ref 40–60)
HEMOGLOBIN: 15.6 G/DL (ref 12–16)
LDL CHOLESTEROL CALCULATED: 99 MG/DL
LYMPHOCYTES ABSOLUTE: 2.9 K/UL (ref 1–5.1)
LYMPHOCYTES RELATIVE PERCENT: 33.4 %
MCH RBC QN AUTO: 31.1 PG (ref 26–34)
MCHC RBC AUTO-ENTMCNC: 34.4 G/DL (ref 31–36)
MCV RBC AUTO: 90.6 FL (ref 80–100)
MONOCYTES ABSOLUTE: 0.5 K/UL (ref 0–1.3)
MONOCYTES RELATIVE PERCENT: 6.2 %
NEUTROPHILS ABSOLUTE: 5.1 K/UL (ref 1.7–7.7)
NEUTROPHILS RELATIVE PERCENT: 59.4 %
PARATHYROID HORMONE INTACT: 37.5 PG/ML (ref 14–72)
PDW BLD-RTO: 13.1 % (ref 12.4–15.4)
PLATELET # BLD: 390 K/UL (ref 135–450)
PMV BLD AUTO: 7.5 FL (ref 5–10.5)
POTASSIUM SERPL-SCNC: 3.9 MMOL/L (ref 3.5–5.1)
RBC # BLD: 5.01 M/UL (ref 4–5.2)
SODIUM BLD-SCNC: 134 MMOL/L (ref 136–145)
TOTAL PROTEIN: 7.1 G/DL (ref 6.4–8.2)
TRIGL SERPL-MCNC: 64 MG/DL (ref 0–150)
TSH REFLEX: 2.01 UIU/ML (ref 0.27–4.2)
VITAMIN D 25-HYDROXY: 19.8 NG/ML
VLDLC SERPL CALC-MCNC: 13 MG/DL
WBC # BLD: 8.6 K/UL (ref 4–11)

## 2022-01-14 DIAGNOSIS — E55.9 VITAMIN D DEFICIENCY: Primary | ICD-10-CM

## 2022-01-14 LAB
ESTIMATED AVERAGE GLUCOSE: 91.1 MG/DL
HBA1C MFR BLD: 4.8 %
HIV AG/AB: NORMAL
HIV ANTIGEN: NORMAL
HIV-1 ANTIBODY: NORMAL
HIV-2 AB: NORMAL

## 2022-01-14 RX ORDER — ERGOCALCIFEROL 1.25 MG/1
50000 CAPSULE ORAL WEEKLY
Qty: 12 CAPSULE | Refills: 1 | Status: ON HOLD | OUTPATIENT
Start: 2022-01-14 | End: 2022-09-11 | Stop reason: HOSPADM

## 2022-01-15 LAB
CALCIUM IONIZED, CALC AT PH 7.4: 1.28 MMOL/L (ref 1.11–1.3)
CALCIUM IONIZED: 1.31 MMOL/L (ref 1.11–1.3)

## 2022-01-17 LAB
VARICELLA ZOSTER AB IGM: 0.09 ISR
VZV IGG SER QL IA: 450.9 IV

## 2022-01-18 LAB
ALK PHOS OTHER CALC: 0 U/L
ALK PHOSPHATASE: 53 U/L (ref 40–120)
ALKALINE PHOSPHATASE BONE FRACTION: 16 U/L (ref 0–55)
ALKALINE PHOSPHATASE LIVER FRACTION: 37 U/L (ref 0–94)

## 2022-01-19 DIAGNOSIS — K21.9 GASTROESOPHAGEAL REFLUX DISEASE WITHOUT ESOPHAGITIS: Primary | ICD-10-CM

## 2022-01-19 RX ORDER — PANTOPRAZOLE SODIUM 40 MG/1
40 TABLET, DELAYED RELEASE ORAL 2 TIMES DAILY
Qty: 60 TABLET | Refills: 2 | OUTPATIENT
Start: 2022-01-19 | End: 2022-01-26

## 2022-01-19 NOTE — TELEPHONE ENCOUNTER
Patient ran out her Protonix and tried to get refilled but her ins says it is to soon. She said that you told her she could double up on them. She wants to know what to do about getting this refilled. Pharmacy is [facility] [phone].   Patient can be reached at [phone]

## 2022-01-21 ENCOUNTER — OFFICE VISIT (OUTPATIENT)
Dept: PRIMARY CARE CLINIC | Age: 27
End: 2022-01-21
Payer: COMMERCIAL

## 2022-01-21 VITALS
BODY MASS INDEX: 32.39 KG/M2 | HEIGHT: 65 IN | WEIGHT: 194.4 LBS | SYSTOLIC BLOOD PRESSURE: 122 MMHG | DIASTOLIC BLOOD PRESSURE: 86 MMHG

## 2022-01-21 DIAGNOSIS — K21.9 GASTROESOPHAGEAL REFLUX DISEASE WITHOUT ESOPHAGITIS: ICD-10-CM

## 2022-01-21 DIAGNOSIS — R10.2 CHRONIC PELVIC PAIN IN FEMALE: ICD-10-CM

## 2022-01-21 DIAGNOSIS — Z00.00 WELL ADULT EXAM: Primary | ICD-10-CM

## 2022-01-21 DIAGNOSIS — G89.29 CHRONIC PELVIC PAIN IN FEMALE: ICD-10-CM

## 2022-01-21 PROCEDURE — 99395 PREV VISIT EST AGE 18-39: CPT | Performed by: NURSE PRACTITIONER

## 2022-01-21 PROCEDURE — 99213 OFFICE O/P EST LOW 20 MIN: CPT | Performed by: NURSE PRACTITIONER

## 2022-01-21 RX ORDER — FAMOTIDINE 20 MG/1
20 TABLET, FILM COATED ORAL NIGHTLY PRN
Qty: 30 TABLET | Refills: 2 | Status: SHIPPED | OUTPATIENT
Start: 2022-01-21 | End: 2022-01-24

## 2022-01-21 SDOH — ECONOMIC STABILITY: FOOD INSECURITY: WITHIN THE PAST 12 MONTHS, THE FOOD YOU BOUGHT JUST DIDN'T LAST AND YOU DIDN'T HAVE MONEY TO GET MORE.: NEVER TRUE

## 2022-01-21 SDOH — ECONOMIC STABILITY: FOOD INSECURITY: WITHIN THE PAST 12 MONTHS, YOU WORRIED THAT YOUR FOOD WOULD RUN OUT BEFORE YOU GOT MONEY TO BUY MORE.: NEVER TRUE

## 2022-01-21 ASSESSMENT — ENCOUNTER SYMPTOMS
SORE THROAT: 0
WHEEZING: 0
SHORTNESS OF BREATH: 0
CONSTIPATION: 0
NAUSEA: 1
CHEST TIGHTNESS: 0
DIARRHEA: 0
SINUS PAIN: 0
VOMITING: 0
COUGH: 0
EYE PAIN: 0
ABDOMINAL PAIN: 1
BLOOD IN STOOL: 0
FACIAL SWELLING: 0
TROUBLE SWALLOWING: 0

## 2022-01-21 ASSESSMENT — SOCIAL DETERMINANTS OF HEALTH (SDOH): HOW HARD IS IT FOR YOU TO PAY FOR THE VERY BASICS LIKE FOOD, HOUSING, MEDICAL CARE, AND HEATING?: NOT HARD AT ALL

## 2022-01-21 NOTE — PATIENT INSTRUCTIONS
Patient Education        Pelvic Pain: Care Instructions  Your Care Instructions     Pelvic pain, or pain in the lower belly, can have many causes. Often pelvic pain is not serious and gets better in a few days. If your pain continues or gets worse, you may need tests and treatment. Tell your doctor about any new symptoms. These may be signs of a serious problem. Follow-up care is a key part of your treatment and safety. Be sure to make and go to all appointments, and call your doctor if you are having problems. It's also a good idea to know your test results and keep a list of the medicines you take. How can you care for yourself at home? · Rest until you feel better. Lie down, and raise your legs by placing a pillow under your knees. · Drink plenty of fluids. You may find that small, frequent sips are easier on your stomach than if you drink a lot at once. Avoid drinks with carbonation or caffeine, such as soda pop, tea, or coffee. · Try eating several small meals instead of 2 or 3 large ones. Eat mild foods, such as rice, dry toast or crackers, bananas, and applesauce. Avoid fatty and spicy foods, other fruits, and alcohol until 48 hours after your symptoms have gone away. · Take an over-the-counter pain medicine, such as acetaminophen (Tylenol), ibuprofen (Advil, Motrin), or naproxen (Aleve). Read and follow all instructions on the label. · Do not take two or more pain medicines at the same time unless the doctor told you to. Many pain medicines have acetaminophen, which is Tylenol. Too much acetaminophen (Tylenol) can be harmful. · You can put a heating pad, a warm cloth, or moist heat on your belly to relieve pain. When should you call for help? Call 911  anytime you think you may need emergency care. For example, call if:    · You passed out (lost consciousness). Call your doctor now or seek immediate medical care if:    · You have a new or higher fever.     · You have unusual vaginal bleeding.   · You have new or worse belly or pelvic pain.     · You have vaginal discharge that has increased in amount or smells bad.     · You are dizzy or lightheaded, or you feel like you may faint.     · You have symptoms of sepsis, such as:  ? Shortness of breath. ? Feeling very sick. ? Severe pain. ? A fast heart rate. ? Cool, pale, or clammy skin. ? Feeling confused. ? Feeling very sleepy, or you are hard to wake up. Watch closely for changes in your health, and be sure to contact your doctor if:    · You do not get better as expected. Where can you learn more? Go to https://chpepiceweb.Brandpotion. org and sign in to your [employer] account. Enter [phone] in the Freak'n Genius box to learn more about \"Pelvic Pain: Care Instructions. \"     If you do not have an account, please click on the \"Sign Up Now\" link. Current as of: February 11, 2021               Content Version: 13.1  © 1528-2220 [employer]. Care instructions adapted under license by [facility] ([facility]). If you have questions about a medical condition or this instruction, always ask your healthcare professional. [place] [address] any warranty or liability for your use of this information. Patient Education        Well Visit, Ages 25 to 48: Care Instructions  Overview     Well visits can help you stay healthy. Your doctor has checked your overall health and may have suggested ways to take good care of yourself. Your doctor also may have recommended tests. At home, you can help prevent illness with healthy eating, regular exercise, and other steps. Follow-up care is a key part of your treatment and safety. Be sure to make and go to all appointments, and call your doctor if you are having problems. It's also a good idea to know your test results and keep a list of the medicines you take. How can you care for yourself at home? · Get screening tests that you and your doctor decide on.  Screening helps find diseases before any symptoms appear. · Eat healthy foods. Choose fruits, vegetables, whole grains, protein, and low-fat dairy foods. Limit fat, especially saturated fat. Reduce salt in your diet. · Limit alcohol. If you are a man, have no more than 2 drinks a day or 14 drinks a week. If you are a woman, have no more than 1 drink a day or 7 drinks a week. · Get at least 30 minutes of physical activity on most days of the week. Walking is a good choice. You also may want to do other activities, such as running, swimming, cycling, or playing tennis or team sports. Discuss any changes in your exercise program with your doctor. · Reach and stay at a healthy weight. This will lower your risk for many problems, such as obesity, diabetes, heart disease, and high blood pressure. · Do not smoke or allow others to smoke around you. If you need help quitting, talk to your doctor about stop-smoking programs and medicines. These can increase your chances of quitting for good. · Care for your mental health. It is easy to get weighed down by worry and stress. Learn strategies to manage stress, like deep breathing and mindfulness, and stay connected with your family and community. If you find you often feel sad or hopeless, talk with your doctor. Treatment can help. · Talk to your doctor about whether you have any risk factors for sexually transmitted infections (STIs). You can help prevent STIs if you wait to have sex with a new partner (or partners) until you've each been tested for STIs. It also helps if you use condoms (male or female condoms) and if you limit your sex partners to one person who only has sex with you. Vaccines are available for some STIs, such as HPV. · Use birth control if it's important to you to prevent pregnancy. Talk with your doctor about the choices available and what might be best for you. · If you think you may have a problem with alcohol or drug use, talk to your doctor.  This includes prescription medicines (such as amphetamines and opioids) and illegal drugs (such as cocaine and methamphetamine). Your doctor can help you figure out what type of treatment is best for you. · Protect your skin from too much sun. When you're outdoors from 10 a.m. to 4 p.m., stay in the shade or cover up with clothing and a hat with a wide brim. Wear sunglasses that block UV rays. Even when it's cloudy, put broad-spectrum sunscreen (SPF 30 or higher) on any exposed skin. · See a dentist one or two times a year for checkups and to have your teeth cleaned. · Wear a seat belt in the car. When should you call for help? Watch closely for changes in your health, and be sure to contact your doctor if you have any problems or symptoms that concern you. Where can you learn more? Go to https://Plistenpepiceweb.Seeo. org and sign in to your GreenRoad Technologies account. Enter P072 in the Piki box to learn more about \"Well Visit, Ages 25 to 48: Care Instructions. \"     If you do not have an account, please click on the \"Sign Up Now\" link. Current as of: October 6, 2021               Content Version: 13.1  © 2006-2021 [employer]. Care instructions adapted under license by [facility] ([facility]). If you have questions about a medical condition or this instruction, always ask your healthcare professional. [place] [address] any warranty or liability for your use of this information. Patient Education        Gastroesophageal Reflux Disease (GERD): Care Instructions  Overview     Gastroesophageal reflux disease (GERD) is the backward flow of stomach acid into the esophagus. The esophagus is the tube that leads from your throat to your stomach. A one-way valve prevents the stomach acid from backing up into this tube. But when you have GERD, this valve does not close tightly enough. This can also cause pain and swelling in your esophagus.  (This is called esophagitis.)  If you have mild GERD symptoms including heartburn, you may be able to control the problem with antacids or over-the-counter medicine. You can also make lifestyle changes to help reduce your symptoms. These include changing your diet and eating habits, such as not eating late at night and losing weight. Follow-up care is a key part of your treatment and safety. Be sure to make and go to all appointments, and call your doctor if you are having problems. It's also a good idea to know your test results and keep a list of the medicines you take. How can you care for yourself at home? · Take your medicines exactly as prescribed. Call your doctor if you think you are having a problem with your medicine. · Your doctor may recommend over-the-counter medicine. For mild or occasional indigestion, antacids, such as Tums, Gaviscon, Mylanta, or Maalox, may help. Your doctor also may recommend over-the-counter acid reducers, such as famotidine (Pepcid AC), cimetidine (Tagamet HB), or omeprazole (Prilosec). Read and follow all instructions on the label. If you use these medicines often, talk with your doctor. · Change your eating habits. ? It's best to eat several small meals instead of two or three large meals. ? After you eat, wait 2 to 3 hours before you lie down. ? Avoid foods that make your symptoms worse. These may include chocolate, mint, alcohol, pepper, spicy foods, high-fat foods, or drinks with caffeine in them, such as tea, coffee, kodak, or energy drinks. If your symptoms are worse after you eat a certain food, you may want to stop eating it to see if your symptoms get better. · Do not smoke or chew tobacco. Smoking can make GERD worse. If you need help quitting, talk to your doctor about stop-smoking programs and medicines. These can increase your chances of quitting for good.   · If you have GERD symptoms at night, raise the head of your bed 6 to 8 inches by putting the frame on blocks or placing a foam wedge under the head of your mattress. (Adding extra pillows does not work.)  · Do not wear tight clothing around your middle. · Lose weight if you need to. Losing just 5 to 10 pounds can help. When should you call for help? Call your doctor now or seek immediate medical care if:    · You have new or different belly pain.     · Your stools are black and tarlike or have streaks of blood. Watch closely for changes in your health, and be sure to contact your doctor if:    · Your symptoms have not improved after 2 days.     · Food seems to catch in your throat or chest.   Where can you learn more? Go to https://CyberHeart.Black Tie Ventures. org and sign in to your Mophie account. Enter X213 in the Curbside box to learn more about \"Gastroesophageal Reflux Disease (GERD): Care Instructions. \"     If you do not have an account, please click on the \"Sign Up Now\" link. Current as of: September 8, 2021               Content Version: 13.1  © 2006-2021 Healthwise, Incorporated. Care instructions adapted under license by [facility]. If you have questions about a medical condition or this instruction, always ask your healthcare professional. [place] [address] any warranty or liability for your use of this information.

## 2022-01-21 NOTE — PROGRESS NOTES
2022    Kristen Mckeon (:  1995) joseph 27 y.o. female, here for evaluation of the following medical concerns:    HPI      Well Adult Physical   Patient here for a comprehensive physical exam.The patient reports-Stomach issues have been going on 2 years. Patient is burping every day. Protonix 80mg a day. Patient is feeling constantly nauseated. Also taking Tums, only works for 15 min . Soft and pale stools. Worsened by eating sweets. PGM had UC and father had a hx of colitis. Denies blood in the stool or coffee ground emesis. Having bowel movements a one to few times a day. Pelvic pain  for 2 years. Pap smear 2 years ago. Pain with intercourse and arousal. Hx of OAB,endometriosis . Was having 2  Periods a month 7 days long. Has not had period since end . Has had home pregnancy tests that were negative. Pain is constant. Worse with exercise (abdominal, lunges, stretching hips, holding weights, running). Feels like she becomes bloated after. Diet- chicken broth, apple sauce, rice   Exercise- Has not been   Does not drink alcohol, no drug use, no smoking. Do you take any herbs or supplements that were not prescribed by a doctor? no Are you taking calcium supplements? no Are you taking aspirin daily? no      Review of Systems   Constitutional: Negative for chills and fever. HENT: Negative for congestion, ear pain, facial swelling, sinus pain, sore throat and trouble swallowing. Eyes: Negative for pain and visual disturbance. Respiratory: Negative for cough, chest tightness, shortness of breath and wheezing. Cardiovascular: Negative for chest pain, palpitations and leg swelling. Gastrointestinal: Positive for abdominal pain and nausea. Negative for blood in stool, constipation, diarrhea and vomiting. Endocrine: Negative for polydipsia and polyuria. Genitourinary: Positive for pelvic pain.  Negative for difficulty urinating, hematuria, vaginal bleeding, vaginal discharge and vaginal pain. Musculoskeletal: Negative for arthralgias and myalgias. Skin: Negative for pallor and rash. Allergic/Immunologic: Negative for environmental allergies and food allergies. Neurological: Negative for dizziness, syncope, weakness, numbness and headaches. Hematological: Negative for adenopathy. Does not bruise/bleed easily. Psychiatric/Behavioral: Negative for dysphoric mood and suicidal ideas. Prior to Visit Medications    Medication Sig Taking? Authorizing Provider   pantoprazole (PROTONIX) 40 MG tablet Take 1 tablet by mouth 2 times daily Yes ERASMO Graham CNP   vitamin D (ERGOCALCIFEROL) 1.25 MG (82992 UT) CAPS capsule Take 1 capsule by mouth once a week Yes ERASMO Graham - CNP   cephALEXin (KEFLEX) 500 MG capsule Take 1 capsule by mouth 4 times daily for 5 days  ALEXYS Garcia   Prenatal Vit-Fe Fumarate-FA (PRENATAL VITAMIN AND MINERAL) 28-0.8 MG TABS Take 1 tablet by mouth daily  ALEXYS Garcia   metoclopramide (REGLAN) 10 MG tablet Take 1 tablet by mouth 3 times daily as needed (nausea and vomiting)  ALEXYS Garcia   famotidine (PEPCID) 20 MG tablet Take 1 tablet by mouth 2 times daily  ALEXYS Garcia   norethindrone (MICRONOR) 0.35 MG tablet Take 1 tablet by mouth daily  Patient not taking: Reported on 12/20/2021  Historical Provider, MD   albuterol sulfate  (90 Base) MCG/ACT inhaler Inhale 2 puffs into the lungs every 6 hours as needed for Wheezing  Patient not taking: Reported on 12/20/2021  Historical Provider, MD        Allergies   Allergen Reactions    Other Anaphylaxis    Shrimp Flavor        History reviewed. No pertinent past medical history.     Past Surgical History:   Procedure Laterality Date    LAPAROSCOPY         Social History     Socioeconomic History    Marital status:      Spouse name: Not on file    Number of children: Not on file    Years of education: Not on file    Highest education level: Not on file Occupational History    Not on file   Tobacco Use    Smoking status: Never Smoker    Smokeless tobacco: Never Used   Vaping Use    Vaping Use: Never used   Substance and Sexual Activity    Alcohol use: Not on file    Drug use: Never    Sexual activity: Yes   Other Topics Concern    Not on file   Social History Narrative    Not on file     Social Determinants of Health     Financial Resource Strain: Low Risk     Difficulty of Paying Living Expenses: Not hard at all   Food Insecurity: No Food Insecurity    Worried About [address] in the Last Year: Never true    [facility] in the Last Year: Never true   Transportation Needs:     Lack of Transportation (Medical): Not on file    Lack of Transportation (Non-Medical): Not on file   Physical Activity:     Days of Exercise per Week: Not on file    Minutes of Exercise per Session: Not on file   Stress:     Feeling of Stress : Not on file   Social Connections:     Frequency of Communication with Friends and Family: Not on file    Frequency of Social Gatherings with Friends and Family: Not on file    Attends Faith Services: Not on file    Active Member of [address] [place] [address] or Organizations: Not on file    Attends Club or Organization Meetings: Not on file    Marital Status: Not on file   Intimate Partner Violence:     Fear of Current or Ex-Partner: Not on file    Emotionally Abused: Not on file    Physically Abused: Not on file    Sexually Abused: Not on file   Housing Stability:     Unable to Pay for Housing in the Last Year: Not on file    Number of [place] in the Last Year: Not on file    Unstable Housing in the Last Year: Not on file        History reviewed. No pertinent family history. Vitals:    01/21/22 0842   BP: 122/86   Weight: 194 lb 6.4 oz (88.2 kg)   Height: 5' 5\" (1.651 m)     Estimated body mass index is 32.35 kg/m² as calculated from the following:    Height as of this encounter: 5' 5\" (1.651 m).     Weight as of this encounter: 194 lb 6.4 oz (88.2 kg). Physical Exam  Vitals reviewed. Constitutional:       Appearance: She is well-developed. HENT:      Right Ear: Tympanic membrane, ear canal and external ear normal.      Left Ear: Tympanic membrane, ear canal and external ear normal.      Nose: Nose normal.      Mouth/Throat:      Mouth: Mucous membranes are moist.      Pharynx: Oropharynx is clear. Eyes:      Extraocular Movements: Extraocular movements intact. Conjunctiva/sclera: Conjunctivae normal.      Pupils: Pupils are equal, round, and reactive to light. Neck:      Thyroid: No thyromegaly. Cardiovascular:      Rate and Rhythm: Normal rate and regular rhythm. Pulses: Normal pulses. Heart sounds: Normal heart sounds. No murmur heard. Pulmonary:      Effort: Pulmonary effort is normal.      Breath sounds: Normal breath sounds. Abdominal:      General: Bowel sounds are normal.      Palpations: Abdomen is soft. Tenderness: There is no abdominal tenderness. Musculoskeletal:         General: Normal range of motion. Cervical back: Normal range of motion and neck supple. Skin:     General: Skin is warm and dry. Capillary Refill: Capillary refill takes less than 2 seconds. Neurological:      Mental Status: She is alert and oriented to person, place, and time. Psychiatric:         Mood and Affect: Mood normal.         Behavior: Behavior normal.         Judgment: Judgment normal.         ASSESSMENT/PLAN:  1. Well adult exam  All ages:   3. Exercise regularly. Ideally, we should all be getting 30 minutes of exercise 5 days a week to prevent weight gain, improve heart health, prevent arthritis, boost mood and immunity, and encourage good sleep. Exercise is better than any medication! 2. Eat a balanced diet with at least 5 servings of fruits and vegetables daily. Reduce salt and sodium, fats, and sugars. 3. Wear sunscreen when out in the sun.  Reapply every 2-3 hours or after swimming or excessive sweating. 4. Get a yearly flu vaccine and keep your tetanus booster up to date every 10 years. 5. Do not smoke or chew! If you smoke, ask your doctor for help to quit. 6. Alcohol is acceptable in moderation, but do not drink more than one drink daily. 2. Chronic pelvic pain in female  -     Daija Brewster MD, Urogynecology, [facility]  3. Gastroesophageal reflux disease without esophagitis   >Trial 6 week famotidine with protonix 80mg    Return in about 4 weeks (around 2/18/2022) for Re-evaluation.

## 2022-01-24 RX ORDER — FAMOTIDINE 20 MG/1
TABLET, FILM COATED ORAL
Qty: 90 TABLET | Refills: 0 | Status: SHIPPED | OUTPATIENT
Start: 2022-01-24 | End: 2022-01-26

## 2022-01-26 ENCOUNTER — HOSPITAL ENCOUNTER (EMERGENCY)
Age: 27
Discharge: HOME | End: 2022-01-26
Payer: COMMERCIAL

## 2022-01-26 ENCOUNTER — APPOINTMENT (OUTPATIENT)
Dept: ULTRASOUND IMAGING | Age: 27
End: 2022-01-26
Payer: COMMERCIAL

## 2022-01-26 VITALS
TEMPERATURE: 97.9 F | RESPIRATION RATE: 16 BRPM | SYSTOLIC BLOOD PRESSURE: 128 MMHG | DIASTOLIC BLOOD PRESSURE: 77 MMHG | OXYGEN SATURATION: 100 % | HEART RATE: 77 BPM | HEIGHT: 65 IN | BODY MASS INDEX: 31.04 KG/M2 | WEIGHT: 186.29 LBS

## 2022-01-26 DIAGNOSIS — O26.891 ABDOMINAL PAIN DURING PREGNANCY IN FIRST TRIMESTER: Primary | ICD-10-CM

## 2022-01-26 DIAGNOSIS — R10.9 ABDOMINAL PAIN DURING PREGNANCY IN FIRST TRIMESTER: Primary | ICD-10-CM

## 2022-01-26 DIAGNOSIS — O23.41 URINARY TRACT INFECTION IN MOTHER DURING FIRST TRIMESTER OF PREGNANCY: ICD-10-CM

## 2022-01-26 DIAGNOSIS — R11.2 NAUSEA AND VOMITING, INTRACTABILITY OF VOMITING NOT SPECIFIED, UNSPECIFIED VOMITING TYPE: ICD-10-CM

## 2022-01-26 LAB
A/G RATIO: 1.2 (ref 1.1–2.2)
ALBUMIN SERPL-MCNC: 4.5 G/DL (ref 3.4–5)
ALP BLD-CCNC: 56 U/L (ref 40–129)
ALT SERPL-CCNC: 34 U/L (ref 10–40)
ANION GAP SERPL CALCULATED.3IONS-SCNC: 18 MMOL/L (ref 3–16)
AST SERPL-CCNC: 25 U/L (ref 15–37)
BACTERIA: ABNORMAL /HPF
BASOPHILS ABSOLUTE: 0 K/UL (ref 0–0.2)
BASOPHILS RELATIVE PERCENT: 0.3 %
BILIRUB SERPL-MCNC: 0.9 MG/DL (ref 0–1)
BILIRUBIN URINE: ABNORMAL
BLOOD, URINE: NEGATIVE
BUN BLDV-MCNC: 7 MG/DL (ref 7–20)
CALCIUM SERPL-MCNC: 9.9 MG/DL (ref 8.3–10.6)
CHLORIDE BLD-SCNC: 101 MMOL/L (ref 99–110)
CHP ED QC CHECK: YES
CLARITY: ABNORMAL
CO2: 15 MMOL/L (ref 21–32)
COLOR: ABNORMAL
COMMENT UA: ABNORMAL
CREAT SERPL-MCNC: 0.6 MG/DL (ref 0.6–1.1)
EOSINOPHILS ABSOLUTE: 0 K/UL (ref 0–0.6)
EOSINOPHILS RELATIVE PERCENT: 0.3 %
EPITHELIAL CELLS, UA: 18 /HPF (ref 0–5)
GFR AFRICAN AMERICAN: >60
GFR NON-AFRICAN AMERICAN: >60
GLUCOSE BLD-MCNC: 121 MG/DL
GLUCOSE BLD-MCNC: 121 MG/DL (ref 70–99)
GLUCOSE BLD-MCNC: 69 MG/DL (ref 70–99)
GLUCOSE URINE: NEGATIVE MG/DL
GONADOTROPIN, CHORIONIC (HCG) QUANT: NORMAL MIU/ML
HCG QUALITATIVE: POSITIVE
HCG(URINE) PREGNANCY TEST: POSITIVE
HCT VFR BLD CALC: 44.4 % (ref 36–48)
HEMOGLOBIN: 15.3 G/DL (ref 12–16)
KETONES, URINE: >=80 MG/DL
LEUKOCYTE ESTERASE, URINE: NEGATIVE
LIPASE: 23 U/L (ref 13–60)
LYMPHOCYTES ABSOLUTE: 1.8 K/UL (ref 1–5.1)
LYMPHOCYTES RELATIVE PERCENT: 15.5 %
MCH RBC QN AUTO: 30.9 PG (ref 26–34)
MCHC RBC AUTO-ENTMCNC: 34.5 G/DL (ref 31–36)
MCV RBC AUTO: 89.5 FL (ref 80–100)
MICROSCOPIC EXAMINATION: YES
MONOCYTES ABSOLUTE: 0.7 K/UL (ref 0–1.3)
MONOCYTES RELATIVE PERCENT: 5.6 %
MUCUS: ABNORMAL /LPF
NEUTROPHILS ABSOLUTE: 9 K/UL (ref 1.7–7.7)
NEUTROPHILS RELATIVE PERCENT: 78.3 %
NITRITE, URINE: NEGATIVE
PDW BLD-RTO: 12.7 % (ref 12.4–15.4)
PERFORMED ON: ABNORMAL
PH UA: 6 (ref 5–8)
PLATELET # BLD: 300 K/UL (ref 135–450)
PMV BLD AUTO: 7.5 FL (ref 5–10.5)
POTASSIUM SERPL-SCNC: 3.1 MMOL/L (ref 3.5–5.1)
PROTEIN UA: ABNORMAL MG/DL
RBC # BLD: 4.96 M/UL (ref 4–5.2)
RBC UA: ABNORMAL /HPF (ref 0–4)
REASON FOR REJECTION: NORMAL
REJECTED TEST: NORMAL
SODIUM BLD-SCNC: 134 MMOL/L (ref 136–145)
SPECIFIC GRAVITY UA: 1.03 (ref 1–1.03)
TOTAL PROTEIN: 8.3 G/DL (ref 6.4–8.2)
URINE REFLEX TO CULTURE: YES
URINE TYPE: ABNORMAL
UROBILINOGEN, URINE: 0.2 E.U./DL
WBC # BLD: 11.5 K/UL (ref 4–11)
WBC UA: 33 /HPF (ref 0–5)

## 2022-01-26 PROCEDURE — 99283 EMERGENCY DEPT VISIT LOW MDM: CPT

## 2022-01-26 PROCEDURE — 76817 TRANSVAGINAL US OBSTETRIC: CPT

## 2022-01-26 PROCEDURE — 2500000003 HC RX 250 WO HCPCS: Performed by: PHYSICIAN ASSISTANT

## 2022-01-26 PROCEDURE — 6370000000 HC RX 637 (ALT 250 FOR IP): Performed by: PHYSICIAN ASSISTANT

## 2022-01-26 PROCEDURE — 83690 ASSAY OF LIPASE: CPT

## 2022-01-26 PROCEDURE — 84702 CHORIONIC GONADOTROPIN TEST: CPT

## 2022-01-26 PROCEDURE — 6360000002 HC RX W HCPCS: Performed by: PHYSICIAN ASSISTANT

## 2022-01-26 PROCEDURE — 2580000003 HC RX 258: Performed by: PHYSICIAN ASSISTANT

## 2022-01-26 PROCEDURE — 85025 COMPLETE CBC W/AUTO DIFF WBC: CPT

## 2022-01-26 PROCEDURE — 84703 CHORIONIC GONADOTROPIN ASSAY: CPT

## 2022-01-26 PROCEDURE — 96375 TX/PRO/DX INJ NEW DRUG ADDON: CPT

## 2022-01-26 PROCEDURE — 87086 URINE CULTURE/COLONY COUNT: CPT

## 2022-01-26 PROCEDURE — 81001 URINALYSIS AUTO W/SCOPE: CPT

## 2022-01-26 PROCEDURE — 96374 THER/PROPH/DIAG INJ IV PUSH: CPT

## 2022-01-26 PROCEDURE — 80053 COMPREHEN METABOLIC PANEL: CPT

## 2022-01-26 RX ORDER — POTASSIUM CHLORIDE 20 MEQ/1
40 TABLET, EXTENDED RELEASE ORAL ONCE
Status: COMPLETED | OUTPATIENT
Start: 2022-01-26 | End: 2022-01-26

## 2022-01-26 RX ORDER — METOCLOPRAMIDE 10 MG/1
10 TABLET ORAL 3 TIMES DAILY PRN
Qty: 20 TABLET | Refills: 0 | Status: SHIPPED | OUTPATIENT
Start: 2022-01-26 | End: 2022-02-04 | Stop reason: SDUPTHER

## 2022-01-26 RX ORDER — DIPHENHYDRAMINE HYDROCHLORIDE 50 MG/ML
12.5 INJECTION INTRAMUSCULAR; INTRAVENOUS ONCE
Status: COMPLETED | OUTPATIENT
Start: 2022-01-26 | End: 2022-01-26

## 2022-01-26 RX ORDER — DEXTROSE MONOHYDRATE 100 MG/ML
INJECTION, SOLUTION INTRAVENOUS ONCE
Status: COMPLETED | OUTPATIENT
Start: 2022-01-26 | End: 2022-01-26

## 2022-01-26 RX ORDER — 0.9 % SODIUM CHLORIDE 0.9 %
1000 INTRAVENOUS SOLUTION INTRAVENOUS ONCE
Status: COMPLETED | OUTPATIENT
Start: 2022-01-26 | End: 2022-01-26

## 2022-01-26 RX ORDER — FAMOTIDINE 20 MG/1
20 TABLET, FILM COATED ORAL 2 TIMES DAILY
Qty: 20 TABLET | Refills: 0 | Status: SHIPPED | OUTPATIENT
Start: 2022-01-26 | End: 2022-03-21

## 2022-01-26 RX ORDER — METOCLOPRAMIDE HYDROCHLORIDE 5 MG/ML
5 INJECTION INTRAMUSCULAR; INTRAVENOUS ONCE
Status: COMPLETED | OUTPATIENT
Start: 2022-01-26 | End: 2022-01-26

## 2022-01-26 RX ORDER — CEPHALEXIN 500 MG/1
500 CAPSULE ORAL 4 TIMES DAILY
Qty: 20 CAPSULE | Refills: 0 | Status: SHIPPED | OUTPATIENT
Start: 2022-01-26 | End: 2022-01-31

## 2022-01-26 RX ORDER — PNV NO.95/FERROUS FUM/FOLIC AC 28MG-0.8MG
1 TABLET ORAL DAILY
Qty: 30 TABLET | Refills: 0 | Status: SHIPPED | OUTPATIENT
Start: 2022-01-26

## 2022-01-26 RX ADMIN — SODIUM CHLORIDE 1000 ML: 9 INJECTION, SOLUTION INTRAVENOUS at 19:55

## 2022-01-26 RX ADMIN — DIPHENHYDRAMINE HYDROCHLORIDE 12.5 MG: 50 INJECTION, SOLUTION INTRAMUSCULAR; INTRAVENOUS at 19:49

## 2022-01-26 RX ADMIN — DEXTROSE MONOHYDRATE: 100 INJECTION, SOLUTION INTRAVENOUS at 20:06

## 2022-01-26 RX ADMIN — METOCLOPRAMIDE HYDROCHLORIDE 5 MG: 5 INJECTION INTRAMUSCULAR; INTRAVENOUS at 19:49

## 2022-01-26 RX ADMIN — POTASSIUM CHLORIDE 40 MEQ: 20 TABLET, EXTENDED RELEASE ORAL at 21:38

## 2022-01-26 RX ADMIN — FAMOTIDINE 20 MG: 10 INJECTION, SOLUTION INTRAVENOUS at 21:39

## 2022-01-26 ASSESSMENT — PAIN DESCRIPTION - PAIN TYPE: TYPE: ACUTE PAIN

## 2022-01-26 ASSESSMENT — PAIN SCALES - GENERAL: PAINLEVEL_OUTOF10: 7

## 2022-01-26 ASSESSMENT — PAIN DESCRIPTION - LOCATION: LOCATION: ABDOMEN

## 2022-01-27 LAB — URINE CULTURE, ROUTINE: NORMAL

## 2022-01-27 ASSESSMENT — ENCOUNTER SYMPTOMS
SHORTNESS OF BREATH: 0
ABDOMINAL PAIN: 1
COLOR CHANGE: 0
NAUSEA: 1
VOMITING: 1

## 2022-01-27 NOTE — ED NOTES
Called pharmacy and spoke to [place] about doing the ns and d 10 infusion at same time, just use the y port, Susi Martinez states that is fine , they are compatible.       Davion Roque RN  01/26/22 2001

## 2022-01-27 NOTE — ED PROVIDER NOTES
[address] [facility]      Pt Name: Luis Garcia  MRN: [id number]  [place] 1995  Date of evaluation: 1/26/2022  Provider: ALEXYS Freeman    This patient was not seen and evaluated by the attending physician No att. providers found. CHIEF COMPLAINT       Chief Complaint   Patient presents with    Emesis     x7days    Abdominal Pain       CRITICAL CARE TIME   I performed a total Critical Care time of 15 minutes, excluding separately reportable procedures. There was a high probability of clinically significant/life threatening deterioration in the patient's condition which required my urgent intervention. Not limited to multiple reexaminations, discussions with attending physician and consultants. HISTORY OF PRESENT ILLNESS  (Location/Symptom, Timing/Onset, Context/Setting, Quality, Duration, Modifying Factors, Severity.)   Luis Garcia is a 27 y.o. female who presents to the emergency department complaining of generalized abdominal pain and nausea vomiting. She states that she had something similar happen in the beginning of December end of November. At that time she had elevated liver enzymes and it was thought that this potentially was related to taking Tylenol. She is scheduled to follow-up with Dr. Magy Dominguez of GI. She states that the pain which she has travels and is all over has been going on since then worse recently and for the past 7 days she has been vomiting. She denies vaginal bleeding. Her last menstrual period was the beginning of December but she states she is very irregular. She has history of chronic pelvic pain, endometriosis and interstitial cystitis. She states she is  denies new sexual partners. G1, P0 was not aware she was pregnant. Nursing Notes were reviewed and I agree.     REVIEW OF SYSTEMS    (2-9 systems for level 4, 10 or more for level 5)     Review of Systems   Constitutional: Negative for fever.   Respiratory: Negative for shortness of breath. Cardiovascular: Negative for chest pain. Gastrointestinal: Positive for abdominal pain, nausea and vomiting. Genitourinary: Positive for pelvic pain. Negative for vaginal bleeding. Skin: Negative for color change, rash and wound. Psychiatric/Behavioral: Negative for agitation, behavioral problems and confusion. Except as noted above the remainder of the review of systems was reviewed and negative. PAST MEDICAL HISTORY   No past medical history on file. SURGICAL HISTORY           Procedure Laterality Date    LAPAROSCOPY         CURRENT MEDICATIONS       Discharge Medication List as of 1/26/2022 10:03 PM      CONTINUE these medications which have NOT CHANGED    Details   vitamin D (ERGOCALCIFEROL) 1.25 MG (74726 UT) CAPS capsule Take 1 capsule by mouth once a week, Disp-12 capsule, R-1Normal      norethindrone (MICRONOR) 0.35 MG tablet Take 1 tablet by mouth dailyHistorical Med      albuterol sulfate  (90 Base) MCG/ACT inhaler Inhale 2 puffs into the lungs every 6 hours as needed for WheezingHistorical Med             ALLERGIES     Other and Shrimp flavor    FAMILY HISTORY     No family history on file. No family status information on file. SOCIAL HISTORY      reports that she has never smoked. She has never used smokeless tobacco. She reports that she does not use drugs. PHYSICAL EXAM    (up to 7 for level 4, 8 or more for level 5)     ED Triage Vitals [01/26/22 1654]   BP Temp Temp Source Pulse Resp SpO2 Height Weight   130/85 97.6 °F (36.4 °C) Temporal 79 16 99 % 5' 5\" (1.651 m) 186 lb 4.6 oz (84.5 kg)       Physical Exam  Vitals and nursing note reviewed. Constitutional:       Appearance: She is well-developed. HENT:      Head: Normocephalic and atraumatic. Mouth/Throat:      Mouth: Mucous membranes are moist.   Cardiovascular:      Rate and Rhythm: Normal rate.    Pulmonary:      Effort: Pulmonary effort is normal.   Abdominal:      Tenderness: There is generalized abdominal tenderness. There is no guarding or rebound. Skin:     General: Skin is warm. Neurological:      General: No focal deficit present. Mental Status: She is alert and oriented to person, place, and time. Psychiatric:         Mood and Affect: Mood normal.         Behavior: Behavior normal.         DIAGNOSTIC RESULTS     RADIOLOGY:   Non-plain film images such as CT, Ultrasound and MRI are read by the radiologist. Plain radiographic images are visualized and preliminarily interpreted by ALEXYS Kent with the below findings:    Reviewed radiologist's interpretation. Interpretation per the Radiologist below, if available at the time of this note:    US OB TRANSVAGINAL   Final Result   Santos intrauterine pregnancy, with normal fetal heart motion,   corresponding to 7 weeks 4 days. As limitation the maternal ovaries are not identified.                LABS:  Labs Reviewed   COMPREHENSIVE METABOLIC PANEL - Abnormal; Notable for the following components:       Result Value    Sodium 134 (*)     Potassium 3.1 (*)     CO2 15 (*)     Anion Gap 18 (*)     Glucose 69 (*)     Total Protein 8.3 (*)     All other components within normal limits    Narrative:     Performed at:  [address] [address]   Phone [phone]   URINE RT REFLEX TO CULTURE - Abnormal; Notable for the following components:    Clarity, UA CLOUDY (*)     Bilirubin Urine SMALL (*)     Ketones, Urine >=80 (*)     Protein, UA TRACE (*)     All other components within normal limits    Narrative:     Performed at:  [facility]  [address] [facility] [address]   Phone [phone]   MICROSCOPIC URINALYSIS - Abnormal; Notable for the following components:    Mucus, UA 3+ (*)     Bacteria, UA 3+ (*)     WBC, UA 33 (*)     Epithelial Cells, UA 18 (*)     All other TempSrc: Temporal    SpO2: 99% 100%   Weight: 186 lb 4.6 oz (84.5 kg)    Height: 5' 5\" (1.651 m)      I discussed with Mik Lucas and/or family the exam results, diagnosis, care, prognosis, reasons to return and the importance of follow up. Patient and/or family is in full agreement with plan and all questions have been answered. Specific discharge instructions explained, including reasons to return to the emergency department. Mik Lucas is well appearing, non-toxic, and afebrile at the time of discharge. Patient was complaining of primarily pelvic with generalized abdominal pain not aware she was pregnant. She is 7 weeks 4 days pregnant on ultrasound imaging today. She is not bleeding. She has evidence of possible urinary tract infection however she has history of interstitial cystitis. Will prescribe antibiotic instructed to follow-up with her gynecologist/OB. She felt much better and is tolerating p.o. instructed to take prenatal vitamins follow-up with OB/GYN and return for new, worsening or other concerns including vaginal bleeding. We will instruct her to stop her Protonix. Switch to Pepcid. I estimate there is LOW risk for ACUTE APPENDICITIS, BOWEL OBSTRUCTION, CHOLECYSTITIS, DIVERTICULITIS, INCARCERATED HERNIA, PANCREATITIS, PELVIC INFLAMMATORY DISEASE, OVARIAN TORSION, PERFORATED BOWEL,  BOWEL ISCHEMIA, CARDIAC ISCHEMIA, ECTOPIC PREGNANCY, or TUBO-OVARIAN ABSCESS, thus I consider the discharge disposition reasonable. Also, there is no evidence or peritonitis, sepsis, or toxicity. CONSULTS:  None    PROCEDURES:  Procedures      FINAL IMPRESSION      1. Abdominal pain during pregnancy in first trimester    2. Nausea and vomiting, intractability of vomiting not specified, unspecified vomiting type    3.  Urinary tract infection in mother during first trimester of pregnancy          DISPOSITION/PLAN   DISPOSITION Decision To Discharge 01/26/2022 09:17:06 PM      PATIENT REFERRED TO:  [facility]  [address]  [address]  [phone]  Call in 1 day  For follow up with OBGYN      DISCHARGE MEDICATIONS:  Discharge Medication List as of 1/26/2022 10:03 PM      START taking these medications    Details   cephALEXin (KEFLEX) 500 MG capsule Take 1 capsule by mouth 4 times daily for 5 days, Disp-20 capsule, R-0Print      Prenatal Vit-Fe Fumarate-FA (PRENATAL VITAMIN AND MINERAL) 28-0.8 MG TABS Take 1 tablet by mouth daily, Disp-30 tablet, R-0Print      metoclopramide (REGLAN) 10 MG tablet Take 1 tablet by mouth 3 times daily as needed (nausea and vomiting), Disp-20 tablet, R-0Print             (Please note that portions of this note were completed with a voice recognition program.  Efforts were made to edit the dictations but occasionally words are mis-transcribed.)    Justin Barcenas, [address], [place]  01/27/22 0000

## 2022-01-27 NOTE — ED NOTES
Pt d/c to home with , IV d/c and intact, pt d/c instructions and f/u instructions discussed with pt, along with medications, pt and pts  verbally agree they understand, scripts given to pt, pt ambulated to lobby easily.       Vicente Liu RN  01/26/22 6041

## 2022-02-04 ENCOUNTER — TELEPHONE (OUTPATIENT)
Dept: FAMILY MEDICINE CLINIC | Age: 27
End: 2022-02-04

## 2022-02-04 RX ORDER — PANTOPRAZOLE SODIUM 40 MG/1
40 TABLET, DELAYED RELEASE ORAL
Qty: 30 TABLET | Refills: 3 | OUTPATIENT
Start: 2022-02-04

## 2022-02-04 RX ORDER — METOCLOPRAMIDE 10 MG/1
10 TABLET ORAL 3 TIMES DAILY PRN
Qty: 20 TABLET | Refills: 0 | Status: SHIPPED | OUTPATIENT
Start: 2022-02-04 | End: 2022-02-10

## 2022-02-04 NOTE — TELEPHONE ENCOUNTER
----- Message from Marie Taylor sent at 2/4/2022  9:37 AM EST -----  Subject: Refill Request    QUESTIONS  Name of Medication? metoclopramide (REGLAN) 10 MG tablet  Patient-reported dosage and instructions? Take 1 tablet by mouth 3 times   daily as needed (nausea and vomiting)  How many days do you have left? 0  [facility]? [id number]  Pharmacy phone number (if available)? [phone]  Additional Information for Provider? Dispense Quantity: 20 tablet  ---------------------------------------------------------------------------  --------------  CALL BACK INFO  What is the best way for the office to contact you? OK to leave message on   voicemail  Preferred Call Back Phone Number?  [phone]

## 2022-02-10 RX ORDER — METOCLOPRAMIDE 10 MG/1
TABLET ORAL
Qty: 20 TABLET | Refills: 0 | Status: SHIPPED | OUTPATIENT
Start: 2022-02-10 | End: 2022-02-21 | Stop reason: ALTCHOICE

## 2022-02-11 LAB
ABO, EXTERNAL RESULT: NORMAL
HEP B, EXTERNAL RESULT: NEGATIVE
HEPATITIS C ANTIBODY, EXTERNAL RESULT: NEGATIVE
HIV, EXTERNAL RESULT: NEGATIVE
RH FACTOR, EXTERNAL RESULT: POSITIVE
RPR, EXTERNAL RESULT: NORMAL
RUBELLA TITER, EXTERNAL RESULT: NORMAL

## 2022-02-16 ENCOUNTER — APPOINTMENT (OUTPATIENT)
Dept: GENERAL RADIOLOGY | Age: 27
End: 2022-02-16
Payer: COMMERCIAL

## 2022-02-16 ENCOUNTER — HOSPITAL ENCOUNTER (EMERGENCY)
Age: 27
Discharge: HOME | End: 2022-02-16
Attending: EMERGENCY MEDICINE
Payer: COMMERCIAL

## 2022-02-16 ENCOUNTER — APPOINTMENT (OUTPATIENT)
Dept: ULTRASOUND IMAGING | Age: 27
End: 2022-02-16
Payer: COMMERCIAL

## 2022-02-16 VITALS
BODY MASS INDEX: 31.18 KG/M2 | HEART RATE: 88 BPM | OXYGEN SATURATION: 97 % | HEIGHT: 65 IN | DIASTOLIC BLOOD PRESSURE: 69 MMHG | TEMPERATURE: 97.7 F | WEIGHT: 187.17 LBS | RESPIRATION RATE: 16 BRPM | SYSTOLIC BLOOD PRESSURE: 107 MMHG

## 2022-02-16 DIAGNOSIS — Z3A.10 10 WEEKS GESTATION OF PREGNANCY: ICD-10-CM

## 2022-02-16 DIAGNOSIS — K29.00 ACUTE GASTRITIS, PRESENCE OF BLEEDING UNSPECIFIED, UNSPECIFIED GASTRITIS TYPE: ICD-10-CM

## 2022-02-16 DIAGNOSIS — R10.13 ABDOMINAL PAIN, EPIGASTRIC: Primary | ICD-10-CM

## 2022-02-16 LAB
ABO/RH: NORMAL
ALBUMIN SERPL-MCNC: 4 G/DL (ref 3.4–5)
ALP BLD-CCNC: 33 U/L (ref 40–129)
ALT SERPL-CCNC: 11 U/L (ref 10–40)
ANION GAP SERPL CALCULATED.3IONS-SCNC: 15 MMOL/L (ref 3–16)
ANTIBODY SCREEN: NORMAL
AST SERPL-CCNC: 20 U/L (ref 15–37)
BASOPHILS ABSOLUTE: 0 K/UL (ref 0–0.2)
BASOPHILS RELATIVE PERCENT: 0.4 %
BILIRUB SERPL-MCNC: 0.7 MG/DL (ref 0–1)
BILIRUBIN DIRECT: <0.2 MG/DL (ref 0–0.3)
BILIRUBIN, INDIRECT: ABNORMAL MG/DL (ref 0–1)
BUN BLDV-MCNC: 5 MG/DL (ref 7–20)
CALCIUM SERPL-MCNC: 8.9 MG/DL (ref 8.3–10.6)
CHLORIDE BLD-SCNC: 103 MMOL/L (ref 99–110)
CO2: 17 MMOL/L (ref 21–32)
CREAT SERPL-MCNC: <0.5 MG/DL (ref 0.6–1.1)
EOSINOPHILS ABSOLUTE: 0.1 K/UL (ref 0–0.6)
EOSINOPHILS RELATIVE PERCENT: 0.7 %
GFR AFRICAN AMERICAN: >60
GFR NON-AFRICAN AMERICAN: >60
GLUCOSE BLD-MCNC: 83 MG/DL (ref 70–99)
GONADOTROPIN, CHORIONIC (HCG) QUANT: NORMAL MIU/ML
HCT VFR BLD CALC: 43.3 % (ref 36–48)
HEMOGLOBIN: 14.9 G/DL (ref 12–16)
LIPASE: 20 U/L (ref 13–60)
LYMPHOCYTES ABSOLUTE: 2.2 K/UL (ref 1–5.1)
LYMPHOCYTES RELATIVE PERCENT: 25.9 %
MCH RBC QN AUTO: 30.5 PG (ref 26–34)
MCHC RBC AUTO-ENTMCNC: 34.4 G/DL (ref 31–36)
MCV RBC AUTO: 88.7 FL (ref 80–100)
MONOCYTES ABSOLUTE: 0.4 K/UL (ref 0–1.3)
MONOCYTES RELATIVE PERCENT: 4.9 %
NEUTROPHILS ABSOLUTE: 5.7 K/UL (ref 1.7–7.7)
NEUTROPHILS RELATIVE PERCENT: 68.1 %
OCCULT BLOOD DIAGNOSTIC: NORMAL
PDW BLD-RTO: 12.9 % (ref 12.4–15.4)
PLATELET # BLD: 280 K/UL (ref 135–450)
PMV BLD AUTO: 7.4 FL (ref 5–10.5)
POTASSIUM SERPL-SCNC: 4 MMOL/L (ref 3.5–5.1)
RBC # BLD: 4.88 M/UL (ref 4–5.2)
SODIUM BLD-SCNC: 135 MMOL/L (ref 136–145)
TOTAL PROTEIN: 7 G/DL (ref 6.4–8.2)
WBC # BLD: 8.4 K/UL (ref 4–11)

## 2022-02-16 PROCEDURE — G0328 FECAL BLOOD SCRN IMMUNOASSAY: HCPCS

## 2022-02-16 PROCEDURE — 86850 RBC ANTIBODY SCREEN: CPT

## 2022-02-16 PROCEDURE — 76705 ECHO EXAM OF ABDOMEN: CPT

## 2022-02-16 PROCEDURE — 85025 COMPLETE CBC W/AUTO DIFF WBC: CPT

## 2022-02-16 PROCEDURE — 86901 BLOOD TYPING SEROLOGIC RH(D): CPT

## 2022-02-16 PROCEDURE — 6370000000 HC RX 637 (ALT 250 FOR IP): Performed by: PHYSICIAN ASSISTANT

## 2022-02-16 PROCEDURE — 84702 CHORIONIC GONADOTROPIN TEST: CPT

## 2022-02-16 PROCEDURE — 96374 THER/PROPH/DIAG INJ IV PUSH: CPT

## 2022-02-16 PROCEDURE — 71046 X-RAY EXAM CHEST 2 VIEWS: CPT

## 2022-02-16 PROCEDURE — 6360000002 HC RX W HCPCS: Performed by: PHYSICIAN ASSISTANT

## 2022-02-16 PROCEDURE — 99284 EMERGENCY DEPT VISIT MOD MDM: CPT

## 2022-02-16 PROCEDURE — 83690 ASSAY OF LIPASE: CPT

## 2022-02-16 PROCEDURE — 76801 OB US < 14 WKS SINGLE FETUS: CPT

## 2022-02-16 PROCEDURE — 86900 BLOOD TYPING SEROLOGIC ABO: CPT

## 2022-02-16 PROCEDURE — 80076 HEPATIC FUNCTION PANEL: CPT

## 2022-02-16 PROCEDURE — 80048 BASIC METABOLIC PNL TOTAL CA: CPT

## 2022-02-16 RX ORDER — MAGNESIUM HYDROXIDE/ALUMINUM HYDROXICE/SIMETHICONE 120; 1200; 1200 MG/30ML; MG/30ML; MG/30ML
5 SUSPENSION ORAL EVERY 6 HOURS PRN
Qty: 710 ML | Refills: 1 | Status: ON HOLD | OUTPATIENT
Start: 2022-02-16 | End: 2022-09-11 | Stop reason: HOSPADM

## 2022-02-16 RX ORDER — ONDANSETRON 2 MG/ML
4 INJECTION INTRAMUSCULAR; INTRAVENOUS ONCE
Status: COMPLETED | OUTPATIENT
Start: 2022-02-16 | End: 2022-02-16

## 2022-02-16 RX ORDER — ONDANSETRON 4 MG/1
4-8 TABLET, ORALLY DISINTEGRATING ORAL EVERY 12 HOURS PRN
Qty: 12 TABLET | Refills: 1 | Status: SHIPPED | OUTPATIENT
Start: 2022-02-16 | End: 2022-03-02 | Stop reason: SDUPTHER

## 2022-02-16 RX ORDER — MAGNESIUM HYDROXIDE/ALUMINUM HYDROXICE/SIMETHICONE 120; 1200; 1200 MG/30ML; MG/30ML; MG/30ML
30 SUSPENSION ORAL ONCE
Status: COMPLETED | OUTPATIENT
Start: 2022-02-16 | End: 2022-02-16

## 2022-02-16 RX ADMIN — ONDANSETRON 4 MG: 2 INJECTION INTRAMUSCULAR; INTRAVENOUS at 14:14

## 2022-02-16 RX ADMIN — MAGNESIUM HYDROXIDE/ALUMINUM HYDROXICE/SIMETHICONE 30 ML: 120; 1200; 1200 SUSPENSION ORAL at 14:46

## 2022-02-16 ASSESSMENT — PAIN SCALES - WONG BAKER
WONGBAKER_NUMERICALRESPONSE: 8
WONGBAKER_NUMERICALRESPONSE: 4

## 2022-02-16 ASSESSMENT — PAIN - FUNCTIONAL ASSESSMENT
PAIN_FUNCTIONAL_ASSESSMENT: PREVENTS OR INTERFERES SOME ACTIVE ACTIVITIES AND ADLS
PAIN_FUNCTIONAL_ASSESSMENT: PREVENTS OR INTERFERES SOME ACTIVE ACTIVITIES AND ADLS

## 2022-02-16 ASSESSMENT — PAIN SCALES - GENERAL
PAINLEVEL_OUTOF10: 5
PAINLEVEL_OUTOF10: 8

## 2022-02-16 ASSESSMENT — PAIN DESCRIPTION - PAIN TYPE
TYPE: ACUTE PAIN
TYPE: ACUTE PAIN

## 2022-02-16 ASSESSMENT — PAIN DESCRIPTION - LOCATION
LOCATION: ABDOMEN
LOCATION: ABDOMEN

## 2022-02-16 ASSESSMENT — PAIN DESCRIPTION - FREQUENCY
FREQUENCY: INTERMITTENT
FREQUENCY: INTERMITTENT

## 2022-02-16 NOTE — ED PROVIDER NOTES
Attending Note:    The patient was seen and examined by the mid-level provider. I also completed a face-to-face examination. HPI: Lisbeth Aldana is a 27 y.o. female who presents to the emergency department with a complaint of epigastric abdominal pain described as a dull constant throbbing sometimes burning pain. Pain is increased over the last few days and for the last couple days she has noticed that her stool was very black in appearance. She denies any bright red blood from the rectum. She does not take any iron supplementation. She denies any dizziness lightheadedness or syncope. She denies any back pain or flank pain. She is currently pregnant. She is  1 para 0. She is followed by Dr. Kari Urbina from [facility]. She was having some abdominal pain back in December and was diagnosed with possible peptic ulcer disease versus gastritis. She has been taking Protonix and Carafate. No previous endoscopy. She denies any fever chills cough or cold symptoms. No chest pain or shortness of breath. She currently rates her pain a 3/10 intensity. Physical Exam:     Constitutional: No apparent distress. Appears comfortable. Very pleasant. Head: No visible evidence of trauma. Normocephalic. Eyes: Pupils equal and reactive. No photophobia. Conjunctiva normal.    HENT: Oral mucosa moist.  Airway patent. Neck:  Soft and supple. Nontender. Heart:  Regular rate and rhythm. No murmur. Lungs:  Clear to auscultation. No wheezes, rales, or ronchi. No conversational dyspnea or accessory muscle use. Abdomen:  Soft, non-distended. Mild vague tenderness in the midepigastric area. No significant tenderness. The remainder the abdomen is nontender. No guarding rigidity or rebound. Musculoskeletal: Extremities non-tender with full range of motion. No calf tenderness erythema or edema. Neurological: Alert and oriented x 3. Speech clear. No acute focal motor or sensory deficits.   Skin: Skin is warm and dry. No rash. Psychiatric: Normal mood and affect. Behavior is normal.     DIAGNOSTIC RESULTS     EKG: All EKG's are interpreted by the Emergency Department Physician who either signs or Co-signs this chart in the absence of a cardiologist.        RADIOLOGY:   Non-plain film images such as CT, Ultrasound and MRI are read by the radiologist. Plain radiographic images are visualized and preliminarily interpreted by the emergency physician with the below findings:        Interpretation per the Radiologist below, if available at the time of this note:    XR CHEST (2 VW)   Final Result   No acute cardiopulmonary disease. US GALLBLADDER RUQ   Final Result   No sonographic abnormality. RECOMMENDATIONS:   Unavailable         US OB LESS THAN 14 WEEKS SINGLE OR FIRST GESTATION   Final Result   Single live intrauterine pregnancy with estimated gestational age by   ultrasound of 10 weeks 3 days. This corresponds to a due date of 09/11/2022. Fetal heart rate is 176 beats per minute. Unremarkable ultrasound appearance of the ovaries with normal color Doppler   flow.                ED BEDSIDE ULTRASOUND:   Performed by ED Physician - none    LABS:  Labs Reviewed   BASIC METABOLIC PANEL - Abnormal; Notable for the following components:       Result Value    Sodium 135 (*)     CO2 17 (*)     BUN 5 (*)     CREATININE <0.5 (*)     All other components within normal limits    Narrative:     Performed at:  [facility]  [address] [facility] [address]   Phone [phone]   HEPATIC FUNCTION PANEL - Abnormal; Notable for the following components:    Alkaline Phosphatase 33 (*)     All other components within normal limits    Narrative:     Performed at:  [facility]  [address] [place], [place] [address]   Phone [phone]   CBC WITH AUTO DIFFERENTIAL    Narrative:     Performed at:  [facility]  [address] [place], [place] [address]   Phone [phone]   LIPASE    Narrative:     Performed at:  [address] [facility]  [address] [place], [place] [address]   Phone [phone]   HCG, QUANTITATIVE, PREGNANCY    Narrative:     Performed at:  [facility]  [address] [place] [address]   Phone [phone]   BLOOD OCCULT STOOL DIAGNOSTIC    Narrative:     ORDER#: [id number]                          ORDERED BY: Court Castellanos  SOURCE: Stool                              COLLECTED:  02/16/22 12:25  ANTIBIOTICS AT SHAR.:                      RECEIVED :  02/16/22 13:26  Performed at:  [facility]  [address] [place] [address]   Phone [phone]   TYPE AND SCREEN    Narrative:     Performed at:  [facility]  [address] [address]   Phone [phone]       All other labs were within normal range or not returned as of this dictation. EMERGENCY DEPARTMENT COURSE and DIFFERENTIAL DIAGNOSIS/MDM:   Vitals:    Vitals:    02/16/22 1025 02/16/22 1234 02/16/22 1252 02/16/22 1715   BP: 136/88 108/62 (!) 102/54 107/69   Pulse: 88      Resp: 16      Temp: 97.5 °F (36.4 °C)   97.7 °F (36.5 °C)   TempSrc: Temporal   Oral   SpO2: 97%      Weight: 187 lb 2.7 oz (84.9 kg)      Height: 5' 5\" (1.651 m)          I personally saw the patient and was involved in the medical decision making. The patient presents with history of recurrent epigastric abdominal pain over the last couple of months. She has had 2 or 3 episodes of vomiting over the last 24 hours but no hematemesis. No coffee-ground emesis. She has had some nausea vomiting with pregnancy. She is currently 8 weeks gestation. There is some mild discomfort in the epigastric area on exam but no significant tenderness. She is hemodynamically stable.   She is not orthostatic. Hemoglobin is 14.9. Platelet count is 161. Creatinine 0.5. Lipase is normal.  Chest x-ray reveals no free air. There is no lower abdominal discomfort or tenderness. No vaginal bleeding or spotting. Pelvic ultrasound was completed. Single live intrauterine pregnancy at 10 weeks 3 days gestation. Gallbladder ultrasound is negative. A call was placed to gastroenterology on-call. The patient may need further evaluation with upper endoscopy. No current evidence of any active or ongoing bleeding. It is our consistent with acute gastritis versus peptic ulcer disease. She is hemodynamically stable and stable for outpatient management. The physician assistant spoke with Dr. Blake Bolton who is on-call for gastroenterology and they will see the patient as an outpatient. She is to call tomorrow for an appointment to be seen in 1 to 2 days. She will be placed on Mylanta and was advised to watch for any bright red blood in the stools, dizziness, fainting, worsening pain, fever, etc.  She was advised to drink plenty of fluids and follow a bland diet. MDM      CRITICAL CARE TIME     I personally saw the patient and independently provided 0 minutes of non-concurrent critical care out of the total shared critical care time provided. This excludes separately reportable procedures. There was a high probability of clinically significant/life threatening deterioration in the patient's condition which required my urgent intervention. CONSULTS:  IP CONSULT TO OB GYN  IP CONSULT TO GI    PROCEDURES:  Unless otherwise noted below, none     Procedures        FINAL IMPRESSION      1. Abdominal pain, epigastric    2. 10 weeks gestation of pregnancy    3.  Acute gastritis, presence of bleeding unspecified, unspecified gastritis type          DISPOSITION/PLAN   DISPOSITION        PATIENT REFERRED TO:  ERASMO Jacinto - CNP  [address]  [address] [place] [address]  [phone]    Call   As needed    Magda Rayo MD  [address]  [place]    Call in 1 day        DISCHARGE MEDICATIONS:  Discharge Medication List as of 2/16/2022  5:26 PM      START taking these medications    Details   aluminum & magnesium hydroxide-simethicone (MYLANTA) 200-200-20 MG/5ML SUSP suspension Take 5 mLs by mouth every 6 hours as needed for Indigestion, Disp-710 mL, R-1Print      ondansetron (ZOFRAN ODT) 4 MG disintegrating tablet Take 1-2 tablets by mouth every 12 hours as needed for Nausea, Disp-12 tablet, R-1Print           Controlled Substances Monitoring:     No flowsheet data found. (Please note that portions of this note were completed with a voice recognition program.  Efforts were made to edit the dictations but occasionally words are mis-transcribed. )    [address] Chun Nicole DO (electronically signed)  Attending Emergency Physician      Tom Mead DO  02/21/22 8950

## 2022-02-16 NOTE — ED NOTES
Discharge and education instructions reviewed. Patient verbalized understanding, teach-back successful. Patient denied questions at this time. No acute distress noted. Patient instructed to follow-up as noted - return to emergency department if symptoms worsen. Patient verbalized understanding. Discharged per EDMD with discharged instructions.        Leela Chua RN  02/16/22 5423

## 2022-02-16 NOTE — ED PROVIDER NOTES
[address] [facility]        Pt Name: Yamileth Johnson  MRN: [id number]  [place] 1995  Date of evaluation: 2/16/2022  Provider: Karon Le PA-C  PCP: ERASMO Mireles CNP  Note Started: 5:12 PM EST       I have seen and evaluated this patient with my supervising physician Radha Nicole DO. Triage CHIEF COMPLAINT       Chief Complaint   Patient presents with    Abdominal Pain     11 weeks preg, pt states she is seeing black stools. hx known ulcer but cant do anything since she is preg. associated n/v         HISTORY OF PRESENT ILLNESS   (Location/Symptom, Timing/Onset, Context/Setting, Quality, Duration, Modifying Factors, Severity)  Note limiting factors. Chief Complaint: Complaint of epigastric abdominal pain she is 11 weeks pregnant. History of gastritis. Says she sees Dr. Yanelis Gómez. She has been taking her Protonix and Zocor fate with no relief. Also sees  at the [address] [place] [address] clinic. Patient denies fever, does complain of some nausea. Says she cannot keep anything down. Also complaint of passing black tarry stool. No vaginal pain or vaginal bleeding. No lower abdominal pain. No dizziness or lightheadedness, no weakness, no other complaints. Yamileth Johnson is a 27 y.o. female who presents to emergency room with the above complaint. Nursing Notes were all reviewed and agreed with or any disagreements were addressed in the HPI. REVIEW OF SYSTEMS    (2-9 systems for level 4, 10 or more for level 5)     Review of Systems   Constitutional: Negative for chills and fever. HENT: Negative for congestion and sore throat. Eyes: Negative for pain and visual disturbance. Respiratory: Negative for cough and shortness of breath. Cardiovascular: Negative for chest pain and leg swelling. Gastrointestinal: Positive for abdominal pain, blood in stool, nausea and vomiting.    Genitourinary: Negative for dysuria, frequency, vaginal bleeding, vaginal discharge and vaginal pain. Musculoskeletal: Negative for back pain and neck pain. Skin: Negative for rash and wound. Neurological: Negative for dizziness and light-headedness. PAST MEDICAL HISTORY   No past medical history on file. SURGICAL HISTORY     Past Surgical History:   Procedure Laterality Date    LAPAROSCOPY         CURRENTMEDICATIONS       Previous Medications    ALBUTEROL SULFATE  (90 BASE) MCG/ACT INHALER    Inhale 2 puffs into the lungs every 6 hours as needed for Wheezing    FAMOTIDINE (PEPCID) 20 MG TABLET    Take 1 tablet by mouth 2 times daily    METOCLOPRAMIDE (REGLAN) 10 MG TABLET    TAKE 1 TABLET BY MOUTH THREE TIMES DAILY AS NEEDED FOR NAUSEA OR VOMITING    NORETHINDRONE (MICRONOR) 0.35 MG TABLET    Take 1 tablet by mouth daily    PRENATAL VIT-FE FUMARATE-FA (PRENATAL VITAMIN AND MINERAL) 28-0.8 MG TABS    Take 1 tablet by mouth daily    VITAMIN D (ERGOCALCIFEROL) 1.25 MG (33370 UT) CAPS CAPSULE    Take 1 capsule by mouth once a week       ALLERGIES     Other and Shrimp flavor    FAMILYHISTORY     No family history on file.      SOCIAL HISTORY       Social History     Socioeconomic History    Marital status:      Spouse name: Not on file    Number of children: Not on file    Years of education: Not on file    Highest education level: Not on file   Occupational History    Not on file   Tobacco Use    Smoking status: Never Smoker    Smokeless tobacco: Never Used   Vaping Use    Vaping Use: Never used   Substance and Sexual Activity    Alcohol use: Not on file    Drug use: Never    Sexual activity: Yes   Other Topics Concern    Not on file   Social History Narrative    Not on file     Social Determinants of Health     Financial Resource Strain: Low Risk     Difficulty of Paying Living Expenses: Not hard at all   Food Insecurity: No Food Insecurity    Worried About [address] in the Last Year: Never true    Jordon of Food in the Last Year: Never true   Transportation Needs:     Lack of Transportation (Medical): Not on file    Lack of Transportation (Non-Medical): Not on file   Physical Activity:     Days of Exercise per Week: Not on file    Minutes of Exercise per Session: Not on file   Stress:     Feeling of Stress : Not on file   Social Connections:     Frequency of Communication with Friends and Family: Not on file    Frequency of Social Gatherings with Friends and Family: Not on file    Attends [employer]ism Services: Not on file    Active Member of [address] [place] [address] [employer] or Organizations: Not on file    Attends Club or Organization Meetings: Not on file    Marital Status: Not on file   Intimate Partner Violence:     Fear of Current or Ex-Partner: Not on file    Emotionally Abused: Not on file    Physically Abused: Not on file    Sexually Abused: Not on file   Housing Stability:     Unable to Pay for Housing in the Last Year: Not on file    Number of [place] in the Last Year: Not on file    Unstable Housing in the Last Year: Not on file       SCREENINGS    [place] Coma Scale  Eye Opening: Spontaneous  Best Verbal Response: Oriented  Best Motor Response: Obeys commands  [place] Coma Scale Score: 15        PHYSICAL EXAM    (up to 7 for level 4, 8 or more for level 5)     ED Triage Vitals [02/16/22 1025]   BP Temp Temp Source Pulse Resp SpO2 Height Weight   136/88 97.5 °F (36.4 °C) Temporal 88 16 97 % 5' 5\" (1.651 m) 187 lb 2.7 oz (84.9 kg)       Physical Exam  Vitals and nursing note reviewed. Constitutional:       Appearance: She is well-developed. She is not diaphoretic. HENT:      Head: Normocephalic and atraumatic. Nose: Nose normal.      Mouth/Throat:      Mouth: Mucous membranes are moist.      Pharynx: Oropharynx is clear. No oropharyngeal exudate or posterior oropharyngeal erythema. Eyes:      General:         Right eye: No discharge. Left eye: No discharge.    Cardiovascular: Rate and Rhythm: Normal rate and regular rhythm. Heart sounds: Normal heart sounds. No murmur heard. No friction rub. No gallop. Pulmonary:      Effort: Pulmonary effort is normal. No respiratory distress. Breath sounds: Normal breath sounds. No wheezing or rales. Chest:      Chest wall: No tenderness. Abdominal:      General: Abdomen is flat. Bowel sounds are normal. There is no distension. Palpations: Abdomen is soft. There is no mass. Tenderness: There is abdominal tenderness in the epigastric area. There is no guarding or rebound. Musculoskeletal:         General: Normal range of motion. Cervical back: Normal range of motion and neck supple. Skin:     General: Skin is warm and dry. Neurological:      General: No focal deficit present. Mental Status: She is alert and oriented to person, place, and time.    Psychiatric:         Behavior: Behavior normal.         DIAGNOSTIC RESULTS   LABS:    Labs Reviewed   BASIC METABOLIC PANEL - Abnormal; Notable for the following components:       Result Value    Sodium 135 (*)     CO2 17 (*)     BUN 5 (*)     CREATININE <0.5 (*)     All other components within normal limits    Narrative:     Performed at:  [place] [address]   Phone [phone]   HEPATIC FUNCTION PANEL - Abnormal; Notable for the following components:    Alkaline Phosphatase 33 (*)     All other components within normal limits    Narrative:     Performed at:  [facility]  [address] [place] [address]   Phone [phone]   CBC WITH AUTO DIFFERENTIAL    Narrative:     Performed at:  [facility]  [address] [place] [address]   Phone [phone]   LIPASE    Narrative:     Performed at:  [facility]  [address] [place] [address]   Phone [phone] HCG, QUANTITATIVE, PREGNANCY    Narrative:     Performed at:  [facility]  [address] [place], [place] [address]   Phone [phone]   BLOOD OCCULT STOOL DIAGNOSTIC    Narrative:     ORDER#: [id number]                          ORDERED BY: Court Castellanos  SOURCE: Stool                              COLLECTED:  02/16/22 12:25  ANTIBIOTICS AT SHAR.:                      RECEIVED :  02/16/22 13:26  Performed at:  [facility]  [address] [place], [place] [address]   Phone [phone]   TYPE AND SCREEN    Narrative:     Performed at:  [address] [address]   Phone [phone]       When ordered, only abnormal lab results are displayed. All other labs were within normal range or not returned as of this dictation. EKG: When ordered, EKG's are interpreted by the Emergency Department Physician in the absence of a cardiologist.  Please see their note for interpretation of EKG. RADIOLOGY:   Non-plain film images such as CT, Ultrasound and MRI are read by the radiologist. [employer] radiographic images are visualized andpreliminarily interpreted by the  ED Provider with the below findings:        Interpretation pert Radiologist below, if available at the time of this note:    XR CHEST (2 VW)   Final Result   No acute cardiopulmonary disease. US GALLBLADDER RUQ   Final Result   No sonographic abnormality. RECOMMENDATIONS:   Unavailable         US OB LESS THAN 14 WEEKS SINGLE OR FIRST GESTATION   Preliminary Result   Single live intrauterine pregnancy with estimated gestational age by   ultrasound of 10 weeks 3 days. This corresponds to a due date of 09/11/2022. Fetal heart rate is 176 beats per minute. Unremarkable ultrasound appearance of the ovaries with normal color Doppler   flow.            XR CHEST (2 VW)    Result Date: 2/16/2022  EXAMINATION: TWO XRAY VIEWS OF THE CHEST 2/16/2022 11:59 am COMPARISON: 02/12/2021 HISTORY: ORDERING SYSTEM PROVIDED HISTORY: Chest Discomfort TECHNOLOGIST PROVIDED HISTORY: Reason for exam:->Chest Discomfort Reason for Exam: Chest Discomfort FINDINGS: No confluent airspace disease. No pleural effusion or pneumothorax. Cardiac and mediastinal silhouettes are unchanged. No acute cardiopulmonary disease. US OB LESS THAN 14 WEEKS SINGLE OR FIRST GESTATION    Result Date: 2/16/2022  EXAMINATION: FIRST TRIMESTER OBSTETRIC ULTRASOUND 2/16/2022 TECHNIQUE: Transabdominal and transvaginal first trimester obstetric pelvic ultrasound was performed with color Doppler flow evaluation. COMPARISON: 01/26/2022 HISTORY: ORDERING SYSTEM PROVIDED HISTORY: Pregnant and Pelvic Pain TECHNOLOGIST PROVIDED HISTORY: Reason for exam:->Pregnant and Pelvic Pain Reason for exam:->Right lower quadrant abdominal pain with epigastric pain. History of gastric ulcer. Approximately 11 weeks pregnant. FINDINGS: Uterus: 9.4 x 7.5 x 5.9 cm Gestational Sac(s):  Single normal appearing gestational sac. No evidence of subchorionic hemorrhage. Yolk Sac:  Present Fetal Pole:  Single fetal pole Crown Rump Length:  3.5 cm Fetal Heart Rate:  176 beats per minute Right ovary: 2.2 x 1.8 x 1.6 cm Left ovary: 3.0 x 2.6 x 2.2 cm Normal arterial and venous color Doppler flow is seen in the ovaries. Free fluid: None seen Measurements: Estimated gestational age by current ultrasound: 10 weeks 3 days Estimated gestational by LMP/prior ultrasound: 11 weeks 1 day Estimated Due Date: 09/11/2022     Single live intrauterine pregnancy with estimated gestational age by ultrasound of 10 weeks 3 days. This corresponds to a due date of 09/11/2022. Fetal heart rate is 176 beats per minute. Unremarkable ultrasound appearance of the ovaries with normal color Doppler flow.      US GALLBLADDER RUQ    Result Date: 2/16/2022  EXAMINATION: RIGHT UPPER QUADRANT ULTRASOUND 2/16/2022 11:24 am COMPARISON: None. HISTORY: ORDERING SYSTEM PROVIDED HISTORY: Epigastric abdominal pain with 11 weeks pregnant with black tarry stools. TECHNOLOGIST PROVIDED HISTORY: Reason for exam:->Epigastric abdominal pain with 11 weeks pregnant with black tarry stools. FINDINGS: LIVER:  The liver demonstrates normal echogenicity without evidence of intrahepatic biliary ductal dilatation. BILIARY SYSTEM:  Gallbladder is unremarkable without evidence of pericholecystic fluid, wall thickening or stones. Negative sonographic Parrish's sign. Common bile duct is within normal limits measuring 2 mm. RIGHT KIDNEY: The right kidney is grossly unremarkable without evidence of hydronephrosis. PANCREAS:  Visualized portions of the pancreas are unremarkable. OTHER: The appendix is not sonographically visible. No sonographic abnormality. RECOMMENDATIONS: Unavailable         PROCEDURES   Unless otherwise noted below, none     Procedures    CRITICAL CARE TIME   N/A    CONSULTS:  IP CONSULT TO OB GYN  IP CONSULT TO GI      EMERGENCY DEPARTMENT COURSE and DIFFERENTIAL DIAGNOSIS/MDM:   Vitals:    Vitals:    02/16/22 1025 02/16/22 1234 02/16/22 1252 02/16/22 1715   BP: 136/88 108/62 (!) 102/54 107/69   Pulse: 88      Resp: 16      Temp: 97.5 °F (36.4 °C)   97.7 °F (36.5 °C)   TempSrc: Temporal   Oral   SpO2: 97%      Weight: 187 lb 2.7 oz (84.9 kg)      Height: 5' 5\" (1.651 m)          Patient was given thefollowing medications:  Medications   ondansetron (ZOFRAN) injection 4 mg (4 mg IntraVENous Given 2/16/22 1414)   aluminum & magnesium hydroxide-simethicone (MAALOX) 243-012-54 MG/5ML suspension 30 mL (30 mLs Oral Given 2/16/22 1446)         Emergency room course: Patient on exam throat is clear nonerythematous no exudate. Cardiovascular regular rate rhythm, lungs are clear. No wheeze rales or rhonchi noted. No chest wall tenderness. Abdomen is soft with mild epigastric tenderness with palpation. No rebound or guarding noted.   Normal gestational age. No suprapubic tenderness. No CVA or flank tenderness. Patient has full range of motion all extremities. Alert oriented x4. Does not appear to be in acute distress. Lab result from today shows:  CBC within normal limits with a white count 8.4. RBC 4.88, hemoglobin 14.9 hematocrit 43.3. BMP unremarkable. Lipase 20.0. Quantitative hCG 108,168    Type and screen AB+ with negative antibodies. Transaminases are within normal limits. Chest x-ray show no acute cardiopulmonary disease. Ultrasound gallbladder shows no sonographic abnormality. Ultrasound of OB less than 14 weeks shows a single live intrauterine pregnancy with estimated gestational age by ultrasound 10 weeks 3 days. See above. Patient was given Maalox here in the ED. I placed a call out to her OB/GYN doctor as well as to Dr. Cj Rose. It did take several hours to hear back from GI. Did speak with Dr. Ladonna Herrera who did apologize for the confusion. He was okay with sending this patient home and have her follow-up with Dr. Heather Garcia as outpatient since she is very stable. As well as Dr. Madelyn Perez who states that it does not look like there is any problem with the pregnancy. Patient can be discharged. Patient on reevaluation show much improvement. The Maalox did help with her pain. The gastroenterologist Dr. Ladonna Herrera was okay with me giving the patient prescription for that for home. She can still continue to sulcal fate and the Protonix. Patient was okay with this plan. She will be discharged stable condition. Patient was seen and evaluated by my attending  [facility] who was okay with this plan as well. FINAL IMPRESSION      1. Abdominal pain, epigastric    2. 10 weeks gestation of pregnancy    3.  Acute gastritis, presence of bleeding unspecified, unspecified gastritis type          DISPOSITION/PLAN   DISPOSITION Decision To Discharge 02/16/2022 05:13:43 PM      PATIENT REFERREDTO:  ERASMO Zimmerman - CNP  [address]  [facility]    Call   As needed    Carri Saucedo MD  [address]  [address]  [address] [facility]    Call in 1 day        DISCHARGE MEDICATIONS:  New Prescriptions    ALUMINUM & MAGNESIUM HYDROXIDE-SIMETHICONE (MYLANTA) 200-200-20 MG/5ML SUSP SUSPENSION    Take 5 mLs by mouth every 6 hours as needed for Indigestion    ONDANSETRON (ZOFRAN ODT) 4 MG DISINTEGRATING TABLET    Take 1-2 tablets by mouth every 12 hours as needed for Nausea       DISCONTINUED MEDICATIONS:  Discontinued Medications    No medications on file              (Please note that portions ofthis note were completed with a voice recognition program.  Efforts were made to edit the dictations but occasionally words are mis-transcribed.)    James Lucero PA-C (electronically signed)             James Lucero PA-C  02/17/22 5493

## 2022-02-17 ASSESSMENT — ENCOUNTER SYMPTOMS
EYE PAIN: 0
SHORTNESS OF BREATH: 0
ABDOMINAL PAIN: 1
COUGH: 0
SORE THROAT: 0
BACK PAIN: 0
VOMITING: 1
NAUSEA: 1
BLOOD IN STOOL: 1

## 2022-02-18 ENCOUNTER — NURSE TRIAGE (OUTPATIENT)
Dept: OTHER | Facility: CLINIC | Age: 27
End: 2022-02-18

## 2022-02-18 NOTE — TELEPHONE ENCOUNTER
Received call from [facility] with Red Flag Complaint. Subjective: Caller states \"She has very bad morning sickness she has been to the ER twice. She has lost about 16 lbs and very weak. Most days she vomits between 2-3/day whenever she eats. They believe the vomiting and weakness is due to an ulcer. They don't know for sure it is an ulcer because they can't do a scope because she is pregnant. She was seen in the ER 2 days ago and she is better. \"     Current Symptoms: weight loss, vomiting, weakness. Onset: 5 weeks     Associated Symptoms: reduced appetite    Pain Severity: stomach pain, pelvic pain (unsure rating, patient not with caller). Temperature: none     What has been tried: ER visit      LMP: NA Pregnant: Yes  10 weeks   Recommended disposition: home care, requesting an ER follow up. Will request the next 5 days. Care advice provided, patient verbalizes understanding; denies any other questions or concerns; instructed to call back for any new or worsening symptoms. Patient/Caller agrees with recommended disposition; writer provided warm transfer to [facility] for appointment scheduling     Attention Provider: Thank you for allowing me to participate in the care of your patient. The patient was connected to triage in response to information provided to the [facility]. Please do not respond through this encounter as the response is not directed to a shared pool.       Reason for Disposition   [1] MODERATE vomiting (e.g., 3-5 times / day) AND [2] present > 3 days   [1] Caller has URGENT question (includes prescribed medication questions) AND [2] triager unable to answer question   [1] Recent medical visit within 24 hours AND [2] condition/symptoms BETTER (improving) AND [3] caller has additional questions triager can answer    Protocols used: PREGNANCY - MORNING SICKNESS (NAUSEA AND VOMITING OF PREGNANCY)-ADULT-AH, RECENT MEDICAL VISIT FOR ILLNESS FOLLOW-UP CALL-ADULT-AH

## 2022-02-21 ENCOUNTER — OFFICE VISIT (OUTPATIENT)
Dept: PRIMARY CARE CLINIC | Age: 27
End: 2022-02-21
Payer: COMMERCIAL

## 2022-02-21 VITALS
HEIGHT: 65 IN | SYSTOLIC BLOOD PRESSURE: 118 MMHG | WEIGHT: 182.6 LBS | OXYGEN SATURATION: 99 % | HEART RATE: 68 BPM | DIASTOLIC BLOOD PRESSURE: 70 MMHG | BODY MASS INDEX: 30.42 KG/M2

## 2022-02-21 DIAGNOSIS — R10.13 DYSPEPSIA: ICD-10-CM

## 2022-02-21 DIAGNOSIS — Z3A.11 11 WEEKS GESTATION OF PREGNANCY: Primary | ICD-10-CM

## 2022-02-21 DIAGNOSIS — O21.9 NAUSEA/VOMITING IN PREGNANCY: ICD-10-CM

## 2022-02-21 DIAGNOSIS — R10.13 EPIGASTRIC PAIN: ICD-10-CM

## 2022-02-21 PROCEDURE — 99215 OFFICE O/P EST HI 40 MIN: CPT | Performed by: NURSE PRACTITIONER

## 2022-02-21 RX ORDER — DICYCLOMINE HCL 20 MG
20 TABLET ORAL 4 TIMES DAILY
Qty: 120 TABLET | Refills: 0 | Status: SHIPPED | OUTPATIENT
Start: 2022-02-21 | End: 2022-02-21

## 2022-02-21 RX ORDER — DOXYLAMINE SUCCINATE AND PYRIDOXINE HYDROCHLORIDE, DELAYED RELEASE TABLETS 10 MG/10 MG 10; 10 MG/1; MG/1
1 TABLET, DELAYED RELEASE ORAL 3 TIMES DAILY
Qty: 60 TABLET | Refills: 1 | Status: SHIPPED | OUTPATIENT
Start: 2022-02-21 | End: 2022-03-25

## 2022-02-21 RX ORDER — DOXYLAMINE SUCCINATE AND PYRIDOXINE HYDROCHLORIDE, DELAYED RELEASE TABLETS 10 MG/10 MG 10; 10 MG/1; MG/1
1 TABLET, DELAYED RELEASE ORAL 3 TIMES DAILY
Qty: 60 TABLET | Refills: 1 | Status: SHIPPED | OUTPATIENT
Start: 2022-02-21 | End: 2022-02-21

## 2022-02-21 RX ORDER — DICYCLOMINE HCL 20 MG
20 TABLET ORAL 4 TIMES DAILY
Qty: 120 TABLET | Refills: 0 | Status: SHIPPED | OUTPATIENT
Start: 2022-02-21 | End: 2022-05-13

## 2022-02-21 RX ORDER — SUCRALFATE ORAL 1 G/10ML
1 SUSPENSION ORAL 4 TIMES DAILY
Qty: 1200 ML | Refills: 0 | Status: ON HOLD
Start: 2022-02-21 | End: 2022-09-11 | Stop reason: HOSPADM

## 2022-02-21 ASSESSMENT — ENCOUNTER SYMPTOMS
SHORTNESS OF BREATH: 0
BLOOD IN STOOL: 0
SORE THROAT: 0
COUGH: 0
RECTAL PAIN: 0
NAUSEA: 1
DIARRHEA: 0
ANAL BLEEDING: 0
SINUS PAIN: 0
WHEEZING: 0
EYE PAIN: 0
ABDOMINAL PAIN: 1
TROUBLE SWALLOWING: 0
VOMITING: 0
FACIAL SWELLING: 0
CHEST TIGHTNESS: 0
CONSTIPATION: 0

## 2022-02-21 NOTE — PATIENT INSTRUCTIONS
Patient Education        Indigestion (Dyspepsia or Heartburn): Care Instructions  Your Care Instructions  Sometimes it can be hard to pinpoint the cause of indigestion. (It is also called dyspepsia or heartburn.) Most cases of an upset stomach with bloating, burning, burping, and nausea are minor and go away within several hours. Home treatment and over-the-counter medicine often are able to control symptoms. But if you take medicine to relieve your indigestion without making diet and lifestyle changes, your symptoms are likely to return again and again. If you get indigestion often, it may be a sign of a more serious medical problem. Be sure to follow up with your doctor, who may want to do tests to be sure of the cause of your indigestion. Follow-up care is a key part of your treatment and safety. Be sure to make and go to all appointments, and call your doctor if you are having problems. It's also a good idea to know your test results and keep a list of the medicines you take. How can you care for yourself at home? · Your doctor may recommend over-the-counter medicine. For mild or occasional indigestion, antacids such as Gaviscon, Mylanta, Maalox, or Tums, may help. Be safe with medicines. Be careful when you take over-the-counter antacid medicines. Many of these medicines have aspirin in them. Read the label to make sure that you are not taking more than the recommended dose. Too much aspirin can be harmful. · Your doctor also may recommend over-the-counter acid reducers, such as Pepcid AC (famotidine), Tagamet HB (cimetidine), or Prilosec (omeprazole). Read and follow all instructions on the label. If you use these medicines often, talk with your doctor. · Change your eating habits. ? It's best to eat several small meals instead of two or three large meals. ? After you eat, wait 2 to 3 hours before you lie down. ? Avoid foods that make your symptoms worse.  These may include chocolate, mint, alcohol, pepper, spicy foods, high-fat foods, or drinks with caffeine in them, such as tea, coffee, kodak, or energy drinks. If your symptoms are worse after you eat a certain food, you may want to stop eating it to see if your symptoms get better. · Do not smoke or chew tobacco. Smoking can make GERD worse. If you need help quitting, talk to your doctor about stop-smoking programs and medicines. These can increase your chances of quitting for good. · If you have GERD symptoms at night, raise the head of your bed 6 to 8 inches. You can do this by putting the frame on blocks or placing a foam wedge under the head of your mattress. (Adding extra pillows does not work.)  · Do not wear tight clothing around your middle. · Lose weight if you need to. Losing just 5 to 10 pounds can help. · Do not take anti-inflammatory medicines, such as aspirin, ibuprofen (Advil, Motrin), or naproxen (Aleve). These can irritate the stomach. If you need a pain medicine, try acetaminophen (Tylenol), which does not cause stomach upset. When should you call for help? Call your doctor now or seek immediate medical care if:    · You have new or worse belly pain.     · You are vomiting. Watch closely for changes in your health, and be sure to contact your doctor if:    · You have new or worse symptoms of indigestion.     · You have trouble or pain swallowing.     · You are losing weight.     · You do not get better as expected. Where can you learn more? Go to https://Verisimxiomara.[employer]. org and sign in to your [employer] account. Enter C566 in the [facility] box to learn more about \"Indigestion (Dyspepsia or Heartburn): Care Instructions. \"     If you do not have an account, please click on the \"Sign Up Now\" link. Current as of: September 8, 2021               Content Version: 13.1  © 3610-1206 Healthwise, Incorporated. Care instructions adapted under license by [facility]  If you have questions about a medical condition or this instruction, always ask your healthcare professional. [address] any warranty or liability for your use of this information. Patient Education        Abdominal Pain: Care Instructions  Your Care Instructions     Abdominal pain has many possible causes. Some aren't serious and get better on their own in a few days. Others need more testing and treatment. If your pain continues or gets worse, you need to be rechecked and may need more tests to find out what is wrong. You may need surgery to correct the problem. Don't ignore new symptoms, such as fever, nausea and vomiting, urination problems, pain that gets worse, and dizziness. These may be signs of a more serious problem. Your doctor may have recommended a follow-up visit in the next 8 to 12 hours. If you are not getting better, you may need more tests or treatment. The doctor has checked you carefully, but problems can develop later. If you notice any problems or new symptoms, get medical treatment right away. Follow-up care is a key part of your treatment and safety. Be sure to make and go to all appointments, and call your doctor if you are having problems. It's also a good idea to know your test results and keep a list of the medicines you take. How can you care for yourself at home? · Rest until you feel better. · To prevent dehydration, drink plenty of fluids. Choose water and other clear liquids until you feel better. If you have kidney, heart, or liver disease and have to limit fluids, talk with your doctor before you increase the amount of fluids you drink. · If your stomach is upset, eat mild foods, such as rice, dry toast or crackers, bananas, and applesauce. Try eating several small meals instead of two or three large ones. · Wait until 48 hours after all symptoms have gone away before you have spicy foods, alcohol, and drinks that contain caffeine.   · Do not eat foods that are high in fat.  · Avoid anti-inflammatory medicines such as aspirin, ibuprofen (Advil, Motrin), and naproxen (Aleve). These can cause stomach upset. Talk to your doctor if you take daily aspirin for another health problem. When should you call for help? Call 911 anytime you think you may need emergency care. For example, call if:    · You passed out (lost consciousness).     · You pass maroon or very bloody stools.     · You vomit blood or what looks like coffee grounds.     · You have new, severe belly pain. Call your doctor now or seek immediate medical care if:    · Your pain gets worse, especially if it becomes focused in one area of your belly.     · You have a new or higher fever.     · Your stools are black and look like tar, or they have streaks of blood.     · You have unexpected vaginal bleeding.     · You have symptoms of a urinary tract infection. These may include:  ? Pain when you urinate. ? Urinating more often than usual.  ? Blood in your urine.     · You are dizzy or lightheaded, or you feel like you may faint. Watch closely for changes in your health, and be sure to contact your doctor if:    · You are not getting better after 1 day (24 hours). Where can you learn more? Go to https://lark.[employer]. org and sign in to your [employer] account. Enter P724 in the [employer] box to learn more about \"Abdominal Pain: Care Instructions. \"     If you do not have an account, please click on the \"Sign Up Now\" link. Current as of: July 1, 2021               Content Version: 13.1  © 2352-8629 Healthwise, Incorporated. Care instructions adapted under license by [facility] ([facility]). If you have questions about a medical condition or this instruction, always ask your healthcare professional. [address] any warranty or liability for your use of this information.

## 2022-02-21 NOTE — PROGRESS NOTES
2022    Kristen Mckeon (:  1995) joseph 27 y.o. female, here for evaluation of the following medical concerns:    HPI    Chief Complaint: Complaint of epigastric abdominal pain she is 11 weeks and 1  pregnant. History of gastritis. Says she sees Dr. Nicholas Pablo. She has been taking her Protonix and carafate with no relief. States that it is difficult for her to swallow large carafate pills as she is unable to keep anything down. Also sees  at the [address] [place] [address] clinic. She has been taking both metoclopramide and zofran without relief. Patient denies fever, does complain of some nausea. Highly tearful stating she doesn't \"want to keep the baby if this is how I'll feel through the pregnancy\". Reports that she has not been able to get any relief between medications given to her by GI and OB. No vaginal pain or vaginal bleeding. No lower abdominal pain. No dizziness or lightheadedness, no weakness, no other complaints.     She denies any fever chills cough or cold symptoms. No chest pain or shortness of breath. She currently rates her pain a 7/10 intensity. Interpretation per the Radiologist below, if available at the time of this note:     XR CHEST (2 VW)   Final Result   No acute cardiopulmonary disease.           US GALLBLADDER RUQ   Final Result   No sonographic abnormality.       RECOMMENDATIONS:   Unavailable           US OB LESS THAN 14 WEEKS SINGLE OR FIRST GESTATION   Final Result   Single live intrauterine pregnancy with estimated gestational age by   ultrasound of 10 weeks 3 days. This corresponds to a due date of 2022.       Fetal heart rate is 176 beats per minute.       Unremarkable ultrasound appearance of the ovaries with normal color Doppler   flow.                 Review of Systems   Constitutional: Negative for chills and fever. HENT: Negative for congestion, ear pain, facial swelling, sinus pain, sore throat and trouble swallowing.     Eyes: Negative for pain and visual disturbance. Respiratory: Negative for cough, chest tightness, shortness of breath and wheezing. Cardiovascular: Negative for chest pain, palpitations and leg swelling. Gastrointestinal: Positive for abdominal pain and nausea. Negative for anal bleeding, blood in stool, constipation, diarrhea, rectal pain and vomiting. Endocrine: Negative for polydipsia and polyuria. Genitourinary: Negative for difficulty urinating and hematuria. Musculoskeletal: Negative for arthralgias and myalgias. Skin: Negative for pallor and rash. Allergic/Immunologic: Negative for environmental allergies and food allergies. Neurological: Negative for dizziness, syncope, weakness, numbness and headaches. Hematological: Negative for adenopathy. Does not bruise/bleed easily. Psychiatric/Behavioral: Positive for dysphoric mood and sleep disturbance. Negative for suicidal ideas. The patient is nervous/anxious. Prior to Visit Medications    Medication Sig Taking?  Authorizing Provider   sucralfate (CARAFATE) 1 GM/10ML suspension Take 10 mLs by mouth 4 times daily Yes ERASMO Willson CNP   dicyclomine (BENTYL) 20 MG tablet Take 1 tablet by mouth 4 times daily Yes ERASMO Willson CNP   doxylamine-pyridoxine (DICLEGIS) 10-10 MG TBEC Take 1 tablet by mouth 3 times daily Take 1 tablet in the morning, one tablet in the afternoon and 2 tablets in the evening for prevention of morning sickness Yes ERASMO Willson CNP   aluminum & magnesium hydroxide-simethicone (MYLANTA) 046-218-83 MG/5ML SUSP suspension Take 5 mLs by mouth every 6 hours as needed for Indigestion Yes Seth Ardon PA-C   ondansetron (ZOFRAN ODT) 4 MG disintegrating tablet Take 1-2 tablets by mouth every 12 hours as needed for Nausea Yes Seth Ardon PA-C   Prenatal Vit-Fe Fumarate-FA (PRENATAL VITAMIN AND MINERAL) 28-0.8 MG TABS Take 1 tablet by mouth daily Yes ALEXYS Weber   famotidine (PEPCID) 20 MG tablet Take 1 tablet by mouth 2 times daily Yes ALEXYS Machado   pyridoxine (RA VITAMIN B-6) 50 MG tablet Take 1 tablet by mouth daily  Rosa Maria Lee MD   pantoprazole (PROTONIX) 40 MG tablet Take 1 tablet by mouth 2 times daily  ERASMO Snowden CNP   vitamin D (ERGOCALCIFEROL) 1.25 MG (76595 UT) CAPS capsule Take 1 capsule by mouth once a week  Patient not taking: Reported on 2/21/2022  ERASMO Snowden CNP   norethindrone (MICRONOR) 0.35 MG tablet Take 1 tablet by mouth daily  Patient not taking: Reported on 12/20/2021  Historical Provider, MD   albuterol sulfate  (90 Base) MCG/ACT inhaler Inhale 2 puffs into the lungs every 6 hours as needed for Wheezing  Patient not taking: Reported on 12/20/2021  Historical Provider, MD        Allergies   Allergen Reactions    Other Anaphylaxis    Shrimp Flavor        No past medical history on file. Past Surgical History:   Procedure Laterality Date    LAPAROSCOPY         Social History     Socioeconomic History    Marital status:      Spouse name: Not on file    Number of children: Not on file    Years of education: Not on file    Highest education level: Not on file   Occupational History    Not on file   Tobacco Use    Smoking status: Never Smoker    Smokeless tobacco: Never Used   Vaping Use    Vaping Use: Never used   Substance and Sexual Activity    Alcohol use: Not on file    Drug use: Never    Sexual activity: Yes   Other Topics Concern    Not on file   Social History Narrative    Not on file     Social Determinants of Health     Financial Resource Strain: Low Risk     Difficulty of Paying Living Expenses: Not hard at all   Food Insecurity: No Food Insecurity    Worried About Running Out of Food in the Last Year: Never true    [address] in the Last Year: Never true   Transportation Needs:     Lack of Transportation (Medical): Not on file    Lack of Transportation (Non-Medical):  Not on file   Physical Activity:  Days of Exercise per Week: Not on file    Minutes of Exercise per Session: Not on file   Stress:     Feeling of Stress : Not on file   Social Connections:     Frequency of Communication with Friends and Family: Not on file    Frequency of Social Gatherings with Friends and Family: Not on file    Attends Sikhism Services: Not on file    Active Member of Clubs or Organizations: Not on file    Attends Club or Organization Meetings: Not on file    Marital Status: Not on file   Intimate Partner Violence:     Fear of Current or Ex-Partner: Not on file    Emotionally Abused: Not on file    Physically Abused: Not on file    Sexually Abused: Not on file   Housing Stability:     Unable to Pay for Housing in the Last Year: Not on file    Number of [place] in the Last Year: Not on file    Unstable Housing in the Last Year: Not on file        No family history on file. Vitals:    02/21/22 1344   BP: 118/70   Pulse: 68   SpO2: 99%   Weight: 182 lb 9.6 oz (82.8 kg)   Height: 5' 5\" (1.651 m)     Estimated body mass index is 30.39 kg/m² as calculated from the following:    Height as of this encounter: 5' 5\" (1.651 m). Weight as of this encounter: 182 lb 9.6 oz (82.8 kg). Physical Exam  Vitals reviewed. Constitutional:       Appearance: She is well-developed. HENT:      Right Ear: Tympanic membrane, ear canal and external ear normal.      Left Ear: Tympanic membrane, ear canal and external ear normal.      Nose: Nose normal.      Mouth/Throat:      Mouth: Mucous membranes are moist.      Pharynx: Oropharynx is clear. Eyes:      Extraocular Movements: Extraocular movements intact. Conjunctiva/sclera: Conjunctivae normal.      Pupils: Pupils are equal, round, and reactive to light. Neck:      Thyroid: No thyromegaly. Cardiovascular:      Rate and Rhythm: Normal rate and regular rhythm. Heart sounds: Normal heart sounds.    Pulmonary:      Effort: Pulmonary effort is normal. Breath sounds: Normal breath sounds. Abdominal:      General: Bowel sounds are normal.      Palpations: Abdomen is soft. Tenderness: There is abdominal tenderness in the epigastric area. Musculoskeletal:         General: Normal range of motion. Cervical back: Normal range of motion and neck supple. Skin:     General: Skin is warm and dry. Neurological:      Mental Status: She is alert and oriented to person, place, and time. Psychiatric:         Mood and Affect: Mood is anxious. Affect is tearful. Speech: Speech normal.         Behavior: Behavior normal. Behavior is cooperative. Thought Content: Thought content normal.         Cognition and Memory: Cognition normal.         Judgment: Judgment normal.         ASSESSMENT/PLAN:  1. 11 weeks gestation of pregnancy  -     AFL - Esteban Cosby MD, Gynecology, [facility]  2. Dyspepsia  -     sucralfate (CARAFATE) 1 GM/10ML suspension; Take 10 mLs by mouth 4 times daily, Disp-1200 mL, R-0Normal  -     dicyclomine (BENTYL) 20 MG tablet; Take 1 tablet by mouth 4 times daily, Disp-120 tablet, R-0Normal  3. Epigastric pain  -     sucralfate (CARAFATE) 1 GM/10ML suspension; Take 10 mLs by mouth 4 times daily, Disp-1200 mL, R-0Normal  -     dicyclomine (BENTYL) 20 MG tablet; Take 1 tablet by mouth 4 times daily, Disp-120 tablet, R-0Normal  4. Nausea/vomiting in pregnancy  -     Shiva Goetz MD, Gynecology, [address] [place] [address] [facility] 10-10 MG TBEC; Take 1 tablet by mouth 3 times daily Take 1 tablet in the morning, one tablet in the afternoon and 2 tablets in the evening for prevention of morning sickness, Disp-60 tablet, R-1Normal        I have spent a total 40 minutes face-to-face with this patient and/or guardian. Over 50% of this time was spent on counseling and care coordination. Return in about 2 weeks (around 3/7/2022) for Re-evaluation.

## 2022-02-23 ENCOUNTER — HOSPITAL ENCOUNTER (EMERGENCY)
Age: 27
Discharge: HOME | End: 2022-02-23
Attending: STUDENT IN AN ORGANIZED HEALTH CARE EDUCATION/TRAINING PROGRAM
Payer: COMMERCIAL

## 2022-02-23 VITALS
HEART RATE: 64 BPM | SYSTOLIC BLOOD PRESSURE: 100 MMHG | BODY MASS INDEX: 30.38 KG/M2 | RESPIRATION RATE: 17 BRPM | HEIGHT: 65 IN | WEIGHT: 182.32 LBS | DIASTOLIC BLOOD PRESSURE: 64 MMHG | TEMPERATURE: 98.5 F | OXYGEN SATURATION: 99 %

## 2022-02-23 DIAGNOSIS — R10.9 ABDOMINAL PAIN, UNSPECIFIED ABDOMINAL LOCATION: Primary | ICD-10-CM

## 2022-02-23 DIAGNOSIS — R11.2 NAUSEA AND VOMITING, INTRACTABILITY OF VOMITING NOT SPECIFIED, UNSPECIFIED VOMITING TYPE: ICD-10-CM

## 2022-02-23 DIAGNOSIS — Z3A.12 12 WEEKS GESTATION OF PREGNANCY: ICD-10-CM

## 2022-02-23 LAB
A/G RATIO: 1.1 (ref 1.1–2.2)
ALBUMIN SERPL-MCNC: 3.6 G/DL (ref 3.4–5)
ALP BLD-CCNC: 33 U/L (ref 40–129)
ALT SERPL-CCNC: 11 U/L (ref 10–40)
ANION GAP SERPL CALCULATED.3IONS-SCNC: 19 MMOL/L (ref 3–16)
AST SERPL-CCNC: 20 U/L (ref 15–37)
BASOPHILS ABSOLUTE: 0 K/UL (ref 0–0.2)
BASOPHILS RELATIVE PERCENT: 0.2 %
BILIRUB SERPL-MCNC: 0.4 MG/DL (ref 0–1)
BILIRUBIN URINE: NEGATIVE
BLOOD, URINE: NEGATIVE
BUN BLDV-MCNC: 6 MG/DL (ref 7–20)
CALCIUM SERPL-MCNC: 9.3 MG/DL (ref 8.3–10.6)
CHLORIDE BLD-SCNC: 103 MMOL/L (ref 99–110)
CLARITY: CLEAR
CO2: 12 MMOL/L (ref 21–32)
COLOR: YELLOW
CREAT SERPL-MCNC: 0.6 MG/DL (ref 0.6–1.1)
EOSINOPHILS ABSOLUTE: 0.1 K/UL (ref 0–0.6)
EOSINOPHILS RELATIVE PERCENT: 0.4 %
GFR AFRICAN AMERICAN: >60
GFR NON-AFRICAN AMERICAN: >60
GLUCOSE BLD-MCNC: 66 MG/DL (ref 70–99)
GLUCOSE URINE: NEGATIVE MG/DL
HCT VFR BLD CALC: 47.5 % (ref 36–48)
HEMOGLOBIN: 16 G/DL (ref 12–16)
KETONES, URINE: >=80 MG/DL
LEUKOCYTE ESTERASE, URINE: NEGATIVE
LIPASE: 27 U/L (ref 13–60)
LYMPHOCYTES ABSOLUTE: 2.4 K/UL (ref 1–5.1)
LYMPHOCYTES RELATIVE PERCENT: 20.6 %
MCH RBC QN AUTO: 30.8 PG (ref 26–34)
MCHC RBC AUTO-ENTMCNC: 33.7 G/DL (ref 31–36)
MCV RBC AUTO: 91.2 FL (ref 80–100)
MICROSCOPIC EXAMINATION: ABNORMAL
MONOCYTES ABSOLUTE: 0.6 K/UL (ref 0–1.3)
MONOCYTES RELATIVE PERCENT: 5 %
NEUTROPHILS ABSOLUTE: 8.6 K/UL (ref 1.7–7.7)
NEUTROPHILS RELATIVE PERCENT: 73.8 %
NITRITE, URINE: NEGATIVE
OCCULT BLOOD DIAGNOSTIC: NORMAL
PDW BLD-RTO: 12.8 % (ref 12.4–15.4)
PH UA: 7 (ref 5–8)
PLATELET # BLD: 256 K/UL (ref 135–450)
PMV BLD AUTO: 7.7 FL (ref 5–10.5)
POTASSIUM REFLEX MAGNESIUM: 3.9 MMOL/L (ref 3.5–5.1)
PROTEIN UA: NEGATIVE MG/DL
RBC # BLD: 5.21 M/UL (ref 4–5.2)
REASON FOR REJECTION: NORMAL
REJECTED TEST: NORMAL
SODIUM BLD-SCNC: 134 MMOL/L (ref 136–145)
SPECIFIC GRAVITY UA: 1.02 (ref 1–1.03)
TOTAL PROTEIN: 6.9 G/DL (ref 6.4–8.2)
URINE REFLEX TO CULTURE: ABNORMAL
URINE TYPE: ABNORMAL
UROBILINOGEN, URINE: 0.2 E.U./DL
WBC # BLD: 11.6 K/UL (ref 4–11)

## 2022-02-23 PROCEDURE — 85025 COMPLETE CBC W/AUTO DIFF WBC: CPT

## 2022-02-23 PROCEDURE — 2580000003 HC RX 258: Performed by: STUDENT IN AN ORGANIZED HEALTH CARE EDUCATION/TRAINING PROGRAM

## 2022-02-23 PROCEDURE — 96374 THER/PROPH/DIAG INJ IV PUSH: CPT

## 2022-02-23 PROCEDURE — 80053 COMPREHEN METABOLIC PANEL: CPT

## 2022-02-23 PROCEDURE — 6370000000 HC RX 637 (ALT 250 FOR IP): Performed by: STUDENT IN AN ORGANIZED HEALTH CARE EDUCATION/TRAINING PROGRAM

## 2022-02-23 PROCEDURE — G0328 FECAL BLOOD SCRN IMMUNOASSAY: HCPCS

## 2022-02-23 PROCEDURE — 81003 URINALYSIS AUTO W/O SCOPE: CPT

## 2022-02-23 PROCEDURE — 6360000002 HC RX W HCPCS: Performed by: STUDENT IN AN ORGANIZED HEALTH CARE EDUCATION/TRAINING PROGRAM

## 2022-02-23 PROCEDURE — 83690 ASSAY OF LIPASE: CPT

## 2022-02-23 PROCEDURE — 99285 EMERGENCY DEPT VISIT HI MDM: CPT

## 2022-02-23 RX ORDER — SODIUM CHLORIDE 9 MG/ML
10 INJECTION INTRAVENOUS DAILY
Status: DISCONTINUED | OUTPATIENT
Start: 2022-02-23 | End: 2022-02-24 | Stop reason: HOSPADM

## 2022-02-23 RX ORDER — 0.9 % SODIUM CHLORIDE 0.9 %
1000 INTRAVENOUS SOLUTION INTRAVENOUS ONCE
Status: COMPLETED | OUTPATIENT
Start: 2022-02-23 | End: 2022-02-23

## 2022-02-23 RX ORDER — ONDANSETRON 2 MG/ML
4 INJECTION INTRAMUSCULAR; INTRAVENOUS ONCE
Status: COMPLETED | OUTPATIENT
Start: 2022-02-23 | End: 2022-02-23

## 2022-02-23 RX ORDER — PYRIDOXINE HCL (VITAMIN B6) 50 MG
50 TABLET ORAL DAILY
Qty: 30 TABLET | Refills: 3 | Status: ON HOLD | OUTPATIENT
Start: 2022-02-23 | End: 2022-09-11 | Stop reason: HOSPADM

## 2022-02-23 RX ORDER — PANTOPRAZOLE SODIUM 40 MG/10ML
40 INJECTION, POWDER, LYOPHILIZED, FOR SOLUTION INTRAVENOUS DAILY
Status: DISCONTINUED | OUTPATIENT
Start: 2022-02-23 | End: 2022-02-24 | Stop reason: HOSPADM

## 2022-02-23 RX ORDER — LANOLIN ALCOHOL/MO/W.PET/CERES
50 CREAM (GRAM) TOPICAL ONCE
Status: COMPLETED | OUTPATIENT
Start: 2022-02-23 | End: 2022-02-23

## 2022-02-23 RX ADMIN — SODIUM CHLORIDE 1000 ML: 9 INJECTION, SOLUTION INTRAVENOUS at 20:30

## 2022-02-23 RX ADMIN — LIDOCAINE HYDROCHLORIDE: 20 SOLUTION ORAL; TOPICAL at 21:44

## 2022-02-23 RX ADMIN — ONDANSETRON 4 MG: 2 INJECTION INTRAMUSCULAR; INTRAVENOUS at 22:17

## 2022-02-23 RX ADMIN — PYRIDOXINE HCL TAB 50 MG 50 MG: 50 TAB at 22:19

## 2022-02-23 RX ADMIN — SODIUM CHLORIDE 1000 ML: 9 INJECTION, SOLUTION INTRAVENOUS at 20:37

## 2022-02-23 ASSESSMENT — ENCOUNTER SYMPTOMS
BACK PAIN: 0
COUGH: 0
EYE PAIN: 0
DIARRHEA: 0
VOMITING: 0
NAUSEA: 1
CONSTIPATION: 0
BLOOD IN STOOL: 1
ABDOMINAL PAIN: 1
SORE THROAT: 0
SHORTNESS OF BREATH: 0

## 2022-02-23 ASSESSMENT — PAIN SCALES - GENERAL
PAINLEVEL_OUTOF10: 9
PAINLEVEL_OUTOF10: 0

## 2022-02-23 ASSESSMENT — PAIN DESCRIPTION - LOCATION: LOCATION: ABDOMEN

## 2022-02-23 ASSESSMENT — PAIN DESCRIPTION - PAIN TYPE: TYPE: ACUTE PAIN

## 2022-02-23 ASSESSMENT — PAIN - FUNCTIONAL ASSESSMENT: PAIN_FUNCTIONAL_ASSESSMENT: 0-10

## 2022-02-23 ASSESSMENT — PAIN DESCRIPTION - ORIENTATION: ORIENTATION: MID

## 2022-02-23 NOTE — ED PROVIDER NOTES
[address] [facility]        Pt Name: Michelle Mcmullen  MRN: [id number]  [place] 1995  Date of evaluation: 2/23/2022  Provider: Jocelyn Wiley MD  PCP: ERASMO Brennan CNP  Note Started: 3:10 PM EST       I have seen and evaluated this patient with my supervising physician Jocelyn Wiley MD.      Triage CHIEF COMPLAINT       Chief Complaint   Patient presents with    Abdominal Pain     11 weeks pregnant, doctor thinks she has an ulcer.  had some rectal bleeding today          HISTORY OF PRESENT ILLNESS   (Location/Symptom, Timing/Onset, Context/Setting, Quality, Duration, Modifying Factors, Severity)  Note limiting factors. Chief Complaint: abdominal pain,     Michelle Mcmullen is a 27 y.o. female who presents to the ED with complaints of abdominal pain that has been present for past week. Patient has been evaluated for this here in the ED on 2/16 and saw PCP on 2/21. She comes in today because she saw bright red blood on the toilet paper when she wiped. Her doctor believes that she may have a upper GI bleed, but she wants to ensure that this is not getting worse. She has associated lightheadedness, nausea, vomiting but this has been going on and she thinks it is related to her pregnancy. She denies fever, chills, hematuria, dysuria    Nursing Notes were all reviewed and agreed with or any disagreements were addressed in the HPI. REVIEW OF SYSTEMS    (2-9 systems for level 4, 10 or more for level 5)     Review of Systems   Constitutional: Negative for chills and fever. HENT: Negative for ear pain and sore throat. Eyes: Negative for pain and visual disturbance. Respiratory: Negative for cough and shortness of breath. Cardiovascular: Negative for chest pain and leg swelling. Gastrointestinal: Positive for abdominal pain, blood in stool and nausea. Negative for constipation, diarrhea and vomiting.    Genitourinary: Negative for dysuria and hematuria. Musculoskeletal: Negative for back pain and neck pain. Skin: Negative for rash and wound. Neurological: Negative for light-headedness and headaches. PAST MEDICAL HISTORY   No past medical history on file. SURGICAL HISTORY     Past Surgical History:   Procedure Laterality Date    LAPAROSCOPY         CURRENTMEDICATIONS       Discharge Medication List as of 2/23/2022 10:20 PM      CONTINUE these medications which have NOT CHANGED    Details   sucralfate (CARAFATE) 1 GM/10ML suspension Take 10 mLs by mouth 4 times daily, Disp-1200 mL, R-0Normal      dicyclomine (BENTYL) 20 MG tablet Take 1 tablet by mouth 4 times daily, Disp-120 tablet, R-0Normal      doxylamine-pyridoxine (DICLEGIS) 10-10 MG TBEC Take 1 tablet by mouth 3 times daily Take 1 tablet in the morning, one tablet in the afternoon and 2 tablets in the evening for prevention of morning sickness, Disp-60 tablet, R-1Normal      aluminum & magnesium hydroxide-simethicone (MYLANTA) 200-200-20 MG/5ML SUSP suspension Take 5 mLs by mouth every 6 hours as needed for Indigestion, Disp-710 mL, R-1Print      ondansetron (ZOFRAN ODT) 4 MG disintegrating tablet Take 1-2 tablets by mouth every 12 hours as needed for Nausea, Disp-12 tablet, R-1Print      Prenatal Vit-Fe Fumarate-FA (PRENATAL VITAMIN AND MINERAL) 28-0.8 MG TABS Take 1 tablet by mouth daily, Disp-30 tablet, R-0Print      famotidine (PEPCID) 20 MG tablet Take 1 tablet by mouth 2 times daily, Disp-20 tablet, R-0Print      vitamin D (ERGOCALCIFEROL) 1.25 MG (94133 UT) CAPS capsule Take 1 capsule by mouth once a week, Disp-12 capsule, R-1Normal      norethindrone (MICRONOR) 0.35 MG tablet Take 1 tablet by mouth dailyHistorical Med      albuterol sulfate  (90 Base) MCG/ACT inhaler Inhale 2 puffs into the lungs every 6 hours as needed for WheezingHistorical Med             ALLERGIES     Other and Shrimp flavor    FAMILYHISTORY     No family history on file. SOCIAL HISTORY       Social History     Socioeconomic History    Marital status:      Spouse name: Not on file    Number of children: Not on file    Years of education: Not on file    Highest education level: Not on file   Occupational History    Not on file   Tobacco Use    Smoking status: Never Smoker    Smokeless tobacco: Never Used   Vaping Use    Vaping Use: Never used   Substance and Sexual Activity    Alcohol use: Not on file    Drug use: Never    Sexual activity: Yes   Other Topics Concern    Not on file   Social History Narrative    Not on file     Social Determinants of Health     Financial Resource Strain: Low Risk     Difficulty of Paying Living Expenses: Not hard at all   Food Insecurity: No Food Insecurity    Worried About [address] in the Last Year: Never true    [address] [employer] in the Last Year: Never true   Transportation Needs:     Lack of Transportation (Medical): Not on file    Lack of Transportation (Non-Medical):  Not on file   Physical Activity:     Days of Exercise per Week: Not on file    Minutes of Exercise per Session: Not on file   Stress:     Feeling of Stress : Not on file   Social Connections:     Frequency of Communication with Friends and Family: Not on file    Frequency of Social Gatherings with Friends and Family: Not on file    Attends Zoroastrianism Services: Not on file    Active Member of [address] [place] [address] [employer] or Organizations: Not on file    Attends Club or Organization Meetings: Not on file    Marital Status: Not on file   Intimate Partner Violence:     Fear of Current or Ex-Partner: Not on file    Emotionally Abused: Not on file    Physically Abused: Not on file    Sexually Abused: Not on file   Housing Stability:     Unable to Pay for Housing in the Last Year: Not on file    Number of [place] in the Last Year: Not on file    Unstable Housing in the Last Year: Not on file       SCREENINGS    [place] Coma Scale  Eye Opening: Spontaneous  Best Verbal Response: Oriented  Best Motor Response: Obeys commands  [place] Coma Scale Score: 15        PHYSICAL EXAM    (up to 7 for level 4, 8 or more for level 5)     ED Triage Vitals [02/23/22 1353]   BP Temp Temp Source Pulse Resp SpO2 Height Weight   102/75 98.8 °F (37.1 °C) Temporal 76 16 98 % 5' 5\" (1.651 m) 182 lb 5.1 oz (82.7 kg)       Physical Exam  Constitutional:       General: She is not in acute distress. Appearance: Normal appearance. She is not ill-appearing, toxic-appearing or diaphoretic. HENT:      Head: Normocephalic and atraumatic. Right Ear: External ear normal.      Left Ear: External ear normal.      Nose: Nose normal.   Eyes:      General:         Right eye: No discharge. Left eye: No discharge. Cardiovascular:      Rate and Rhythm: Normal rate and regular rhythm. Pulmonary:      Effort: Pulmonary effort is normal. No respiratory distress. Abdominal:      General: Abdomen is flat. There is no distension. Comments: Diffuse tenderness  No erythema, ecchymosis, edema   Genitourinary:     Vagina: Normal.      Cervix: No discharge, erythema or cervical bleeding. Musculoskeletal:         General: Normal range of motion. Cervical back: Normal range of motion. Skin:     General: Skin is warm and dry. Neurological:      General: No focal deficit present. Mental Status: She is alert and oriented to person, place, and time.    Psychiatric:         Mood and Affect: Mood normal.         Behavior: Behavior normal.         DIAGNOSTIC RESULTS   LABS:    Labs Reviewed   CBC WITH AUTO DIFFERENTIAL - Abnormal; Notable for the following components:       Result Value    WBC 11.6 (*)     RBC 5.21 (*)     Neutrophils Absolute 8.6 (*)     All other components within normal limits    Narrative:     Performed at:  [address] [address]   Phone [phone]   COMPREHENSIVE METABOLIC PANEL W/ REFLEX TO MG FOR LOW K - Abnormal; Notable for the following components:    Sodium 134 (*)     CO2 12 (*)     Anion Gap 19 (*)     Glucose 66 (*)     BUN 6 (*)     Alkaline Phosphatase 33 (*)     All other components within normal limits    Narrative:     Ember Radhacelina tel. [phone],  Chemistry results called to and read back by Juanito Tiwari RN, 02/23/2022 16:39,  by [facility] AD[facility]N TORRES  Performed at:  [address] [place] [address]   Phone [phone]   URINALYSIS WITH REFLEX TO CULTURE - Abnormal; Notable for the following components:    Ketones, Urine >=80 (*)     All other components within normal limits    Narrative:     Performed at:  [address] [place] [address]   Phone [phone]   BLOOD OCCULT STOOL DIAGNOSTIC    Narrative:     ORDER#: [id number]                          ORDERED BY: Vicki Wilson  SOURCE: Stool                              COLLECTED:  02/23/22 20:43  ANTIBIOTICS AT SHAR.:                      RECEIVED :  02/23/22 20:43  Performed at:  [address] [place] [address]   Phone [phone]   LIPASE    Narrative:     Ember Quintero tel. [phone],  Chemistry results called to and read back by Laisha Oliveira, 02/23/2022 16:39,  by [facility] AD[facility]N TORRES  Performed at:  [address] [facility] [place] [address]   Phone [phone]   SPECIMEN REJECTION    Narrative:     Performed at:  [address] [facility] [place] [address]   Phone [phone]   HCG, QUANTITATIVE, PREGNANCY       When ordered, only abnormal lab results are displayed. All other labs were within normal range or not returned as of this dictation. EKG:  When ordered, EKG's are interpreted by the Emergency Department Physician in the absence of a cardiologist.  Please see their note for interpretation of EKG. RADIOLOGY:   Non-plain film images such as CT, Ultrasound and MRI are read by the radiologist. Plain radiographic images are visualized andpreliminarily interpreted by the  ED Provider with the below findings:        Interpretation perthe Radiologist below, if available at the time of this note:    No orders to display     No results found. PROCEDURES   Unless otherwise noted below, none     Procedures    CRITICAL CARE TIME   N/A    CONSULTS:  IP CONSULT TO GI      EMERGENCY DEPARTMENT COURSE and DIFFERENTIAL DIAGNOSIS/MDM:   Vitals:    Vitals:    02/23/22 1635 02/23/22 1935 02/23/22 2104 02/23/22 2231   BP:  99/63  100/64   Pulse:  56  64   Resp: 18 14 16 17   Temp:  98.5 °F (36.9 °C)     TempSrc:  Oral     SpO2:  99%  99%   Weight:       Height:           Patient was given thefollowing medications:  Medications   0.9 % sodium chloride bolus (0 mLs IntraVENous Stopped 2/23/22 2232)   0.9 % sodium chloride bolus (0 mLs IntraVENous Stopped 2/23/22 2232)   vitamin B-6 (PYRIDOXINE) tablet 50 mg (50 mg Oral Given 2/23/22 2219)   aluminum & magnesium hydroxide-simethicone (MAALOX) 30 mL, lidocaine viscous hcl (XYLOCAINE) 5 mL (GI COCKTAIL) ( Oral Given 2/23/22 2144)   ondansetron (ZOFRAN) injection 4 mg (4 mg IntraVENous Given 2/23/22 2217)           To the ED with complaints of this is a 21-year-old female who is 11 weeks pregnant and who was recently evaluated in the ED for bleeding stomach ulcer. Patient states that she has been having harder dark stool. This morning when she went to wipe she states that there was bright red blood on the toilet paper, which made her come into the ED. Patient is currently on medication for her upper GI bleed. Her vitals upon arrival are within normal limits. Glucose is 66 and patient was given some orange juice.   I performed a pelvic exam and there was no visible blood seen in vaginal canal.  I then performed a fecal occult and there was no blood visualized at that time. No hemorrhoids visualized or felt at that time. CMP shows that patient CO2 is 12 and an anion gap of 19. At this time we attempted to start IV fluids and Protonix. However patient is a difficult stick and my attending Dr. Kia Davis was unable to get IV access with ultrasound. At this time PICC line team was consulted. I then handed patient over to Dr. Kia Davis for further evaluation and treatment at the end of my shift. FINAL IMPRESSION      1. Abdominal pain, unspecified abdominal location    2.  Nausea and vomiting, intractability of vomiting not specified, unspecified vomiting type    3. 12 weeks gestation of pregnancy          DISPOSITION/PLAN   DISPOSITION        PATIENT REFERREDTO:  Please follow-up with your OB/GYN    Schedule an appointment as soon as possible for a visit in 2 days        DISCHARGE MEDICATIONS:  Discharge Medication List as of 2/23/2022 10:20 PM      START taking these medications    Details   pyridoxine (RA VITAMIN B-6) 50 MG tablet Take 1 tablet by mouth daily, Disp-30 tablet, R-3Print             DISCONTINUED MEDICATIONS:  Discharge Medication List as of 2/23/2022 10:20 PM      STOP taking these medications       pantoprazole (PROTONIX) 40 MG tablet Comments:   Reason for Stopping:                      (Please note that portions ofthis note were completed with a voice recognition program.  Efforts were made to edit the dictations but occasionally words are mis-transcribed.)    Amari George MD (electronically signed)              Cooper Mauro PA-C  02/23/22 2013       Rosa Maria Lee MD  02/28/22 1210

## 2022-02-23 NOTE — ED PROVIDER NOTES
MidLevel Attestation   I havepersonally performed and/or participated in the history, exam and medical decision making and agree with all pertinent clinical information. I have also reviewed and agree with the past medical, family and social historyunless otherwise noted. I have personally performed a face to face diagnostic evaluation onthis patient. I have reviewed the mid-levels findings and agree. In brief, Mary Correa is a 27 y.o. female that presented to the emergency department with   Chief Complaint   Patient presents with    Abdominal Pain     11 weeks pregnant, doctor thinks she has an ulcer.  had some rectal bleeding today    . CONSTITUTIONAL: Well appearing, in no acute distress   EYES: PERRL, No injection, discharge or scleral icterus. NECK: Normal ROM, NO LAD and Moist mucous membranes. CARDIOVASCULAR: Regular rate and rhythm. No murmurs or gallop. PULMONARY/CHEST: Airway patent. No retractions. Breath sounds clear with good air entry bilaterally. ABDOMEN: Soft, Non-distended and non-tender, without guarding or rebound. SKIN: Acyanotic, warm, dry   MUSCULOSKELETAL: No swelling, tenderness or deformity   NEUROLOGICAL: Awake. Pulses intact. Grossly nonfocal     27year-old presents with nausea vomiting abdominal discomfort. On exam nontoxic in no acute distress. Labs obtained with no significant findings. Patient was treated with symptom management with significant improvement. I believe her symptoms are secondary to her pregnancy. Patient stable discharge home.     I have reviewed and interpreted all of the currently available lab results and diagnostics from this visit:  Results for orders placed or performed during the hospital encounter of 02/23/22   CBC with Auto Differential   Result Value Ref Range    WBC 11.6 (H) 4.0 - 11.0 K/uL    RBC 5.21 (H) 4.00 - 5.20 M/uL    Hemoglobin 16.0 12.0 - 16.0 g/dL    Hematocrit 47.5 36.0 - 48.0 %    MCV 91.2 80.0 - 100.0 fL    MCH 30.8 26.0 - 34.0 pg    MCHC 33.7 31.0 - 36.0 g/dL    RDW 12.8 12.4 - 15.4 %    Platelets 617 444 - 047 K/uL    MPV 7.7 5.0 - 10.5 fL    Neutrophils % 73.8 %    Lymphocytes % 20.6 %    Monocytes % 5.0 %    Eosinophils % 0.4 %    Basophils % 0.2 %    Neutrophils Absolute 8.6 (H) 1.7 - 7.7 K/uL    Lymphocytes Absolute 2.4 1.0 - 5.1 K/uL    Monocytes Absolute 0.6 0.0 - 1.3 K/uL    Eosinophils Absolute 0.1 0.0 - 0.6 K/uL    Basophils Absolute 0.0 0.0 - 0.2 K/uL   Comprehensive Metabolic Panel w/ Reflex to MG   Result Value Ref Range    Sodium 134 (L) 136 - 145 mmol/L    Potassium reflex Magnesium 3.9 3.5 - 5.1 mmol/L    Chloride 103 99 - 110 mmol/L    CO2 12 (LL) 21 - 32 mmol/L    Anion Gap 19 (H) 3 - 16    Glucose 66 (L) 70 - 99 mg/dL    BUN 6 (L) 7 - 20 mg/dL    CREATININE 0.6 0.6 - 1.1 mg/dL    GFR Non-African American >60 >60    GFR African American >60 >60    Calcium 9.3 8.3 - 10.6 mg/dL    Total Protein 6.9 6.4 - 8.2 g/dL    Albumin 3.6 3.4 - 5.0 g/dL    Albumin/Globulin Ratio 1.1 1.1 - 2.2    Total Bilirubin 0.4 0.0 - 1.0 mg/dL    Alkaline Phosphatase 33 (L) 40 - 129 U/L    ALT 11 10 - 40 U/L    AST 20 15 - 37 U/L   Urinalysis with Reflex to Culture    Specimen: Urine   Result Value Ref Range    Color, UA YELLOW Straw/Yellow    Clarity, UA Clear Clear    Glucose, Ur Negative Negative mg/dL    Bilirubin Urine Negative Negative    Ketones, Urine >=80 (A) Negative mg/dL    Specific Gravity, UA 1.017 1.005 - 1.030    Blood, Urine Negative Negative    pH, UA 7.0 5.0 - 8.0    Protein, UA Negative Negative mg/dL    Urobilinogen, Urine 0.2 <2.0 E.U./dL    Nitrite, Urine Negative Negative    Leukocyte Esterase, Urine Negative Negative    Microscopic Examination Not Indicated     Urine Type NotGiven     Urine Reflex to Culture Not Indicated    Blood Occult Stool #1   Result Value Ref Range    Occult Blood Diagnostic Result: Negative  Normal range: Negative      Lipase   Result Value Ref Range    Lipase 27.0 13.0 - 60.0 U/L   SPECIMEN REJECTION   Result Value Ref Range    Rejected Test [facility]     Reason for Rejection see below      No results found. ED Medication Orders (From admission, onward)    Start Ordered     Status Ordering Provider    02/23/22 2230 02/23/22 2216  ondansetron (ZOFRAN) injection 4 mg  ONCE         Last MAR action: Given - by Siena Lo on 02/23/22 at 2217 [facility]    02/23/22 2130 02/23/22 2121  vitamin B-6 (PYRIDOXINE) tablet 50 mg  ONCE         Last MAR action: Given - by Siena Lo on 02/23/22 at 454 [facility]    02/23/22 2130 02/23/22 2121  aluminum & magnesium hydroxide-simethicone (MAALOX) 30 mL, lidocaine viscous hcl (XYLOCAINE) 5 mL (GI COCKTAIL)  ONCE         Last MAR action: Given - by Kandice Shpeard on 02/23/22 at 2144 [facility] [place] [facility]    02/23/22 1730 02/23/22 1723  0.9 % sodium chloride bolus  ONCE         Last MAR action: Stopped - by Siena Lo on 02/23/22 at 2232 [facility] [place] [facility]    02/23/22 1730 02/23/22 1724  0.9 % sodium chloride bolus  ONCE         Last MAR action: Stopped - by Siena Lo on 02/23/22 at 2232 [facility]          Final Impression      1. Abdominal pain, unspecified abdominal location    2. Nausea and vomiting, intractability of vomiting not specified, unspecified vomiting type    3. 12 weeks gestation of pregnancy        DISPOSITION Decision To Discharge 02/23/2022 10:03:48 PM       This record is transcribed utilizing voice recognition technology. There are inherent limitations in this technology. In addition, there may be limitations in editing of this report. If there are any discrepancies, please contact me directly.     Annabelle Hollins MD   2/26/2022         Rosa Maria Doherty MD  02/26/22 1832

## 2022-02-23 NOTE — ED NOTES
Called picc team [phone] at [address] [facility]  02/23/22 4394

## 2022-02-24 NOTE — PROGRESS NOTES
DIT VASCULAR ACCESS SERVICES:  Arrived to facility to place Midline or PIV as ordered; pt currently has working PIV R arm; ERMD at bedside and verified that the Midline is not needed at this time; discussed same w Sophia TALBOT RN; Midline/PIV is canceled     ELHAM Zapata RN, BSN, [facility]

## 2022-02-24 NOTE — ED NOTES
IVF infusing, site WNL, pt given warm blankets. She denies any needs at this time.       Kristin Gray RN  02/23/22 7403

## 2022-03-02 RX ORDER — ONDANSETRON 4 MG/1
4 TABLET, ORALLY DISINTEGRATING ORAL EVERY 8 HOURS PRN
Qty: 30 TABLET | Refills: 0 | Status: ON HOLD | OUTPATIENT
Start: 2022-03-02 | End: 2022-09-11 | Stop reason: HOSPADM

## 2022-03-10 ENCOUNTER — OFFICE VISIT (OUTPATIENT)
Dept: PRIMARY CARE CLINIC | Age: 27
End: 2022-03-10
Payer: COMMERCIAL

## 2022-03-10 VITALS
DIASTOLIC BLOOD PRESSURE: 74 MMHG | HEIGHT: 65 IN | HEART RATE: 71 BPM | OXYGEN SATURATION: 98 % | SYSTOLIC BLOOD PRESSURE: 126 MMHG | WEIGHT: 180 LBS | BODY MASS INDEX: 29.99 KG/M2

## 2022-03-10 DIAGNOSIS — R11.2 INTRACTABLE NAUSEA AND VOMITING: ICD-10-CM

## 2022-03-10 DIAGNOSIS — R10.84 GENERALIZED ABDOMINAL PAIN: Primary | ICD-10-CM

## 2022-03-10 DIAGNOSIS — Z3A.13 13 WEEKS GESTATION OF PREGNANCY: ICD-10-CM

## 2022-03-10 DIAGNOSIS — K59.00 CONSTIPATION, UNSPECIFIED CONSTIPATION TYPE: ICD-10-CM

## 2022-03-10 PROCEDURE — 99213 OFFICE O/P EST LOW 20 MIN: CPT | Performed by: NURSE PRACTITIONER

## 2022-03-10 RX ORDER — POLYETHYLENE GLYCOL 3350 17 G/17G
17 POWDER, FOR SOLUTION ORAL DAILY
Qty: 510 G | Refills: 0 | Status: SHIPPED | OUTPATIENT
Start: 2022-03-10 | End: 2022-04-09

## 2022-03-10 ASSESSMENT — ENCOUNTER SYMPTOMS
TROUBLE SWALLOWING: 0
RECTAL PAIN: 0
FACIAL SWELLING: 0
SINUS PAIN: 0
CHEST TIGHTNESS: 0
ABDOMINAL PAIN: 1
VOMITING: 0
SORE THROAT: 0
BLOOD IN STOOL: 0
NAUSEA: 1
DIARRHEA: 0
ANAL BLEEDING: 0
WHEEZING: 0
EYE PAIN: 0
COUGH: 0
SHORTNESS OF BREATH: 0

## 2022-03-10 NOTE — PATIENT INSTRUCTIONS
Patient Education        Belly Pain in Pregnancy: Care Instructions  Overview     When you're pregnant, any belly pain can be a worry. You may not want to call your doctor or midwife about every pain you have. But you don't want to miss something that is dangerous for you or your baby. Even if it feels familiar, belly pain can mean something new when you're pregnant. It's important to know when to call your doctor or midwife. It will also help to know how to care for yourself at home when your pain is not caused by anything harmful. · When belly pain is more severe or constant, see a doctor or midwife right away. · If you're sure your belly pain is a sign of labor, call your doctor or midwife. · When belly pain is brief, it's usually a normal part of pregnancy. It might be related to changes in the growing uterus. Or it could be the stretching of ligaments called round ligaments. These ligaments help support the uterus. Round ligament pain can be on either side of your belly. It can also be felt in your hips or groin. Follow-up care is a key part of your treatment and safety. Be sure to make and go to all appointments, and call your doctor if you are having problems. It's also a good idea to know your test results and keep a list of the medicines you take. How can you tell if belly pain is a sign of labor? When belly pain is caused by labor, it can feel like mild or menstrual-like cramps in your lower belly. These cramps are probably contractions. They can happen in your second or third trimester. You may also have:  · A steady, dull ache in your lower back, pelvis, or thighs. · A feeling of pressure in your pelvis or lower belly. · Changes in your vaginal discharge or a sudden release of fluid from the vagina. If you think you are in labor, call your doctor. How can you care for yourself at home? When belly pain is mild and is not a symptom of labor:  · Rest until you feel better.   · Take a warm bath.  · Think about what you drink and eat:  ? Drink plenty of fluids. Choose water and other clear liquids until you feel better. ? Try eating small, frequent meals. If your stomach is upset, try bland, low-fat foods like plain rice, broiled chicken, toast, and yogurt. · Think about how you move if you are having brief pains from stretching of the round ligaments. ? Try gentle stretching. ? Move a little more slowly when turning in bed or getting up from a chair, so those ligaments don't stretch quickly. ? Lean forward a bit if you think you are going to cough or sneeze. When should you call for help? Call 911  anytime you think you may need emergency care. For example, call if:    · You have sudden, severe pain in your belly.     · You have severe vaginal bleeding.     · You passed out (lost consciousness).     · You have a seizure. Call your doctor now or seek immediate medical care if:    · You have new or worse belly pain or cramping.     · You have any vaginal bleeding.     · You have a fever.     · You have symptoms of preeclampsia, such as:  ? Sudden swelling of your face, hands, or feet. ? New vision problems (such as dimness, blurring, or seeing spots). ? A severe headache.     · You think that you may be in labor. This means that you've had at least 8 contractions within 1 hour or at least 4 contractions within 20 minutes, even after you change your position and drink fluids.     · You have symptoms of a urinary tract infection. These may include:  ? Pain or burning when you urinate. ? A frequent need to urinate without being able to pass much urine. ? Pain in the flank, which is just below the rib cage and above the waist on either side of the back. ? Blood in your urine. Watch closely for changes in your health, and be sure to contact your doctor if you are worried about your or your baby's health. Where can you learn more? Go to https://sergio.health-partners. org and sign in to your need to know about any problems, and what you might do after you have the test results. ? Quadruple (quad) blood test. This screening test can be done between 15 and 22 weeks of pregnancy. It checks the amount of four substances in your blood. The doctor looks at these test results, along with your age and other factors, to find out the chance that your baby may have certain problems. ? Amniocentesis. This diagnostic test is used to look for chromosomal problems in the baby's cells. It can be done between 15 and 20 weeks of pregnancy, usually around week 16.  ? Nuchal translucency test. This test uses ultrasound to measure the thickness of the area at the back of the baby's neck. An increase in the thickness can be an early sign of Down syndrome. ? Chorionic villus sampling (CVS). This is a test that looks for certain genetic problems with your baby. The same genes that are in your baby are in the placenta. A small piece of the placenta is taken out and tested. This test is done when you are 10 to 13 weeks pregnant. Ease discomfort  · Slow down and take naps when you feel tired. · If your emotions swing, talk to someone. · If your gums bleed, try a softer toothbrush. If your gums are puffy and bleed a lot, see your dentist.  · If you feel dizzy:  ? Get up slowly after sitting or lying down. ? Drink plenty of fluids. ? Eat small snacks to keep your blood sugar stable. ? Put your head between your legs as though you were tying your shoelaces. ? Lie down with your legs higher than your head. Use pillows to prop up your feet. · If you have a headache:  ? Lie down. ? Ask your partner or a good friend for a neck massage. ? Try cool cloths over your forehead or across the back of your neck. ? Use acetaminophen (Tylenol) for pain relief. Do not use nonsteroidal anti-inflammatory drugs (NSAIDs), such as ibuprofen (Advil, Motrin) or naproxen (Aleve), unless your doctor says it is okay.   · If you have a nosebleed, pinch your nose gently, and hold it for a short while. To prevent nosebleeds, try massaging a small dab of petroleum jelly, such as Vaseline, in your nostrils. · If your nose is stuffed up, try saline (saltwater) nose sprays. Do not use decongestant sprays. Care for your breasts  · Wear a bra that gives you good support. · Know that changes in your breasts are normal.  ? Your breasts may get larger and more tender. Tenderness usually gets better by 12 weeks. ? Your nipples may get darker and larger, and small bumps around your nipples may show more. ? The veins in your chest and breasts may show more. Where can you learn more? Go to https://Equity Administration Solutions.Shopping Mail. org and sign in to your VoipSwitch account. Enter K165 in the [employer] box to learn more about \"Weeks 10 to 14 of Your Pregnancy: Care Instructions. \"     If you do not have an account, please click on the \"Sign Up Now\" link. Current as of: June 16, 2021               Content Version: 13.1  © 5149-0793 [employer]. Care instructions adapted under license by [facility] ([facility]). If you have questions about a medical condition or this instruction, always ask your healthcare professional. [place] [address] any warranty or liability for your use of this information. Patient Education        Learning About When to Call Your Doctor During Pregnancy (Up to 20 Weeks)  Overview     It's common to have concerns about what might be a problem during your pregnancy. Most pregnancies don't have any serious problems. But it's still important to know when to call your doctor if you have certain symptoms. These are general suggestions. Your doctor may give you some more information about when to call. When to call your doctor (up to 20 weeks)  Call 911 anytime you think you may need emergency care. For example, call if:  · You passed out (lost consciousness).   Call your doctor now or seek immediate medical care if:  · You have a fever. · You have vaginal bleeding. · You are dizzy or lightheaded, or you feel like you may faint. · You have symptoms of a urinary tract infection. These may include:  ? Pain or burning when you urinate. ? A frequent need to urinate without being able to pass much urine. ? Pain in the flank, which is just below the rib cage and above the waist on either side of the back. ? Blood in your urine. · You have belly pain. · You think you are having contractions. · You have a sudden release of fluid from your vagina. Watch closely for changes in your health, and be sure to contact your doctor if:  · You have vaginal discharge that smells bad. · You have other concerns about your pregnancy. Follow-up care is a key part of your treatment and safety. Be sure to make and go to all appointments, and call your doctor if you are having problems. It's also a good idea to know your test results and keep a list of the medicines you take. Where can you learn more? Go to https://MessageGatepemarylouJollyDeck.TitanX Engine Cooling. org and sign in to your Applied StemCell account. Enter K979 in the [facility] box to learn more about \"Learning About When to Call Your Doctor During Pregnancy (Up to 20 Weeks). \"     If you do not have an account, please click on the \"Sign Up Now\" link. Current as of: June 16, 2021               Content Version: 13.1  © 4912-7699 Space Apart. Care instructions adapted under license by [facility] ([facility]). If you have questions about a medical condition or this instruction, always ask your healthcare professional. [place] [address] any warranty or liability for your use of this information. Patient Education        Constipation: Care Instructions  Overview     Constipation means that you have a hard time passing stools (bowel movements). People pass stools from 3 times a day to once every 3 days. What is normal for you may be different. Constipation may occur with pain in the rectum and cramping. The pain may get worse when you try to pass stools. Sometimes there are small amounts of bright red blood on toilet paper or the surface of stools. This is because of enlarged veins near the rectum (hemorrhoids). A few changes in your diet and lifestyle may help you avoid ongoing constipation. Your doctor may also prescribe medicine to help loosen your stool. Some medicines can cause constipation. These include pain medicines and antidepressants. Tell your doctor about all the medicines you take. Your doctor may want to make a medicine change to ease your symptoms. Follow-up care is a key part of your treatment and safety. Be sure to make and go to all appointments, and call your doctor if you are having problems. It's also a good idea to know your test results and keep a list of the medicines you take. How can you care for yourself at home? · Drink plenty of fluids. If you have kidney, heart, or liver disease and have to limit fluids, talk with your doctor before you increase the amount of fluids you drink. · Include high-fiber foods in your diet each day. These include fruits, vegetables, beans, and whole grains. · Get at least 30 minutes of exercise on most days of the week. Walking is a good choice. You also may want to do other activities, such as running, swimming, cycling, or playing tennis or team sports. · Take a fiber supplement, such as Citrucel or Metamucil, every day. Read and follow all instructions on the label. · Schedule time each day for a bowel movement. A daily routine may help. Take your time having a bowel movement, but don't sit for more than 10 minutes at a time. And don't strain too much. · Support your feet with a small step stool when you sit on the toilet. This helps flex your hips and places your pelvis in a squatting position. · Your doctor may recommend an over-the-counter laxative to relieve your constipation. Examples are Milk of Magnesia and MiraLax. Read and follow all instructions on the label. Do not use laxatives on a long-term basis. When should you call for help? Call your doctor now or seek immediate medical care if:    · You have new or worse belly pain.     · You have new or worse nausea or vomiting.     · You have blood in your stools. Watch closely for changes in your health, and be sure to contact your doctor if:    · Your constipation is getting worse.     · You do not get better as expected. Where can you learn more? Go to https://Glooplepepiceweb.Nabto. org and sign in to your [employer] account. Enter 21 [phone] in the [facility] box to learn more about \"Constipation: Care Instructions. \"     If you do not have an account, please click on the \"Sign Up Now\" link. Current as of: July 1, 2021               Content Version: 13.1  © 4971-4735 Healthwise, Incorporated. Care instructions adapted under license by [facility] ([facility]). If you have questions about a medical condition or this instruction, always ask your healthcare professional. [place] [address] any warranty or liability for your use of this information.

## 2022-03-10 NOTE — PROGRESS NOTES
3/10/2022    Kristen Mckeon (:  1995) joseph 27 y.o. female, here for evaluation of the following medical concerns:    HPI      72-year-old female comes to clinic for follow-up on abdominal pain. She reports that she went to [facility] ED on  for rectal bleeding. Patient has been taking  Bentyl and Zofran with efficacy. Was unable to get the Carafate solution as pharmacy was asking $200 for medication. She does continue to have nausea and vomiting, but feels well overall. She did have a pelvic exam that did not show any blood in vaginal vault. She denies dizziness or lightheadedness, weakness. She has been having some constipation. Has not been using OTC medication. She denies any other complaints. Mood is much less tearful today. F/u  Dinora Alexander   F/u  Art Ax. Interpretation per the Radiologist below, if available at the time of this note:     XR CHEST (2 VW)   Final Result   No acute cardiopulmonary disease.           US GALLBLADDER RUQ   Final Result   No sonographic abnormality.       RECOMMENDATIONS:   Unavailable           US OB LESS THAN 14 WEEKS SINGLE OR FIRST GESTATION   Final Result   Single live intrauterine pregnancy with estimated gestational age by   ultrasound of 10 weeks 3 days. This corresponds to a due date of 2022.       Fetal heart rate is 176 beats per minute.       Unremarkable ultrasound appearance of the ovaries with normal color Doppler   flow.                 Review of Systems   Constitutional: Negative for chills and fever. HENT: Negative for congestion, ear pain, facial swelling, sinus pain, sore throat and trouble swallowing. Eyes: Negative for pain and visual disturbance. Respiratory: Negative for cough, chest tightness, shortness of breath and wheezing. Cardiovascular: Negative for chest pain, palpitations and leg swelling. Gastrointestinal: Positive for abdominal pain, constipation and nausea.  Negative for anal bleeding, blood in stool, diarrhea, rectal pain and vomiting. Endocrine: Negative for polydipsia and polyuria. Genitourinary: Negative for difficulty urinating and hematuria. Musculoskeletal: Negative for arthralgias and myalgias. Skin: Negative for pallor and rash. Allergic/Immunologic: Negative for environmental allergies and food allergies. Neurological: Negative for dizziness, syncope, weakness, numbness and headaches. Hematological: Negative for adenopathy. Does not bruise/bleed easily. Psychiatric/Behavioral: Positive for sleep disturbance. Negative for dysphoric mood and suicidal ideas. The patient is nervous/anxious. Prior to Visit Medications    Medication Sig Taking?  Authorizing Provider   polyethylene glycol (GLYCOLAX) 17 GM/SCOOP powder Take 17 g by mouth daily Yes Clem Even, APRN - CNP   ondansetron (ZOFRAN ODT) 4 MG disintegrating tablet Take 1 tablet by mouth every 8 hours as needed for Nausea Yes Clem Even, APRN - CNP   pyridoxine (RA VITAMIN B-6) 50 MG tablet Take 1 tablet by mouth daily Yes Rosa Maria Lee MD   sucralfate (CARAFATE) 1 GM/10ML suspension Take 10 mLs by mouth 4 times daily Yes Clem Even, APRN - CNP   dicyclomine (BENTYL) 20 MG tablet Take 1 tablet by mouth 4 times daily Yes Clem Even, APRN - CNP   doxylamine-pyridoxine (DICLEGIS) 10-10 MG TBEC Take 1 tablet by mouth 3 times daily Take 1 tablet in the morning, one tablet in the afternoon and 2 tablets in the evening for prevention of morning sickness Yes Clem Even, APRN - CNP   aluminum & magnesium hydroxide-simethicone (MYLANTA) 735-717-58 MG/5ML SUSP suspension Take 5 mLs by mouth every 6 hours as needed for Indigestion Yes Robert Mooney PA-C   Prenatal Vit-Fe Fumarate-FA (PRENATAL VITAMIN AND MINERAL) 28-0.8 MG TABS Take 1 tablet by mouth daily Yes ALEXYS Gonsales   famotidine (PEPCID) 20 MG tablet Take 1 tablet by mouth 2 times daily Yes ALEXYS Gonsales   vitamin D (ERGOCALCIFEROL) 1.25 MG (38477 UT) CAPS capsule Take 1 capsule by mouth once a week Yes ERASMO Jacobson CNP   pantoprazole (PROTONIX) 40 MG tablet Take 1 tablet by mouth 2 times daily  ERASMO Jacobson CNP   norethindrone (MICRONOR) 0.35 MG tablet Take 1 tablet by mouth daily  Patient not taking: Reported on 12/20/2021  Historical Provider, MD   albuterol sulfate  (90 Base) MCG/ACT inhaler Inhale 2 puffs into the lungs every 6 hours as needed for Wheezing  Patient not taking: Reported on 12/20/2021  Historical Provider, MD        Allergies   Allergen Reactions    Other Anaphylaxis    Shrimp Flavor        No past medical history on file. Past Surgical History:   Procedure Laterality Date    LAPAROSCOPY         Social History     Socioeconomic History    Marital status:      Spouse name: Not on file    Number of children: Not on file    Years of education: Not on file    Highest education level: Not on file   Occupational History    Not on file   Tobacco Use    Smoking status: Never Smoker    Smokeless tobacco: Never Used   Vaping Use    Vaping Use: Never used   Substance and Sexual Activity    Alcohol use: Not on file    Drug use: Never    Sexual activity: Yes   Other Topics Concern    Not on file   Social History Narrative    Not on file     Social Determinants of Health     Financial Resource Strain: Low Risk     Difficulty of Paying Living Expenses: Not hard at all   Food Insecurity: No Food Insecurity    Worried About Running Out of Food in the Last Year: Never true    [address] in the Last Year: Never true   Transportation Needs:     Lack of Transportation (Medical): Not on file    Lack of Transportation (Non-Medical):  Not on file   Physical Activity:     Days of Exercise per Week: Not on file    Minutes of Exercise per Session: Not on file   Stress:     Feeling of Stress : Not on file   Social Connections:     Frequency of Communication with Friends and Family: Not on file    Frequency of Social Gatherings with Friends and Family: Not on file    Attends Spiritism Services: Not on file    Active Member of Clubs or Organizations: Not on file    Attends Club or Organization Meetings: Not on file    Marital Status: Not on file   Intimate Partner Violence:     Fear of Current or Ex-Partner: Not on file    Emotionally Abused: Not on file    Physically Abused: Not on file    Sexually Abused: Not on file   Housing Stability:     Unable to Pay for Housing in the Last Year: Not on file    Number of [place] in the Last Year: Not on file    Unstable Housing in the Last Year: Not on file        No family history on file. Vitals:    03/10/22 1625   BP: 126/74   Pulse: 71   SpO2: 98%   Weight: 180 lb (81.6 kg)   Height: 5' 5\" (1.651 m)     Estimated body mass index is 29.95 kg/m² as calculated from the following:    Height as of this encounter: 5' 5\" (1.651 m). Weight as of this encounter: 180 lb (81.6 kg). Physical Exam  Vitals reviewed. Constitutional:       Appearance: She is well-developed. HENT:      Right Ear: Tympanic membrane, ear canal and external ear normal.      Left Ear: Tympanic membrane, ear canal and external ear normal.      Nose: Nose normal.      Mouth/Throat:      Mouth: Mucous membranes are moist.      Pharynx: Oropharynx is clear. Eyes:      Extraocular Movements: Extraocular movements intact. Conjunctiva/sclera: Conjunctivae normal.      Pupils: Pupils are equal, round, and reactive to light. Neck:      Thyroid: No thyromegaly. Cardiovascular:      Rate and Rhythm: Normal rate and regular rhythm. Heart sounds: Normal heart sounds. Pulmonary:      Effort: Pulmonary effort is normal.      Breath sounds: Normal breath sounds. Abdominal:      General: Bowel sounds are normal.      Palpations: Abdomen is soft. Tenderness: There is abdominal tenderness in the epigastric area.    Musculoskeletal: General: Normal range of motion. Cervical back: Normal range of motion and neck supple. Skin:     General: Skin is warm and dry. Neurological:      Mental Status: She is alert and oriented to person, place, and time. Psychiatric:         Attention and Perception: Attention normal.         Mood and Affect: Mood is anxious. Affect is not tearful. Speech: Speech normal.         Behavior: Behavior normal. Behavior is cooperative. Thought Content: Thought content normal.         Cognition and Memory: Cognition normal.         Judgment: Judgment normal.         ASSESSMENT/PLAN:  1. Generalized abdominal pain  -     CBC with Auto Differential; Future  -     Basic Metabolic Panel; Future  2. Intractable nausea and vomiting  -     CBC with Auto Differential; Future  -     Basic Metabolic Panel; Future  3. 13 weeks gestation of pregnancy  -     CBC with Auto Differential; Future  -     Basic Metabolic Panel; Future  4. Constipation, unspecified constipation type  -     polyethylene glycol (GLYCOLAX) 17 GM/SCOOP powder; Take 17 g by mouth daily, Disp-510 g, R-0Normal        I have spent a total 40 minutes face-to-face with this patient and/or guardian. Over 50% of this time was spent on counseling and care coordination. Return in about 6 months (around 9/10/2022) for Lab Work.

## 2022-03-17 ASSESSMENT — ENCOUNTER SYMPTOMS: CONSTIPATION: 1

## 2022-03-21 RX ORDER — FAMOTIDINE 20 MG/1
TABLET, FILM COATED ORAL
Qty: 90 TABLET | Refills: 1 | Status: ON HOLD | OUTPATIENT
Start: 2022-03-21 | End: 2022-09-11 | Stop reason: HOSPADM

## 2022-03-25 DIAGNOSIS — O21.9 NAUSEA/VOMITING IN PREGNANCY: ICD-10-CM

## 2022-03-25 RX ORDER — DOXYLAMINE SUCCINATE AND PYRIDOXINE HYDROCHLORIDE, DELAYED RELEASE TABLETS 10 MG/10 MG 10; 10 MG/1; MG/1
TABLET, DELAYED RELEASE ORAL
Qty: 60 TABLET | Refills: 1 | Status: SHIPPED | OUTPATIENT
Start: 2022-03-25 | End: 2022-05-02

## 2022-04-18 DIAGNOSIS — K21.9 GASTROESOPHAGEAL REFLUX DISEASE WITHOUT ESOPHAGITIS: ICD-10-CM

## 2022-04-18 RX ORDER — PANTOPRAZOLE SODIUM 40 MG/1
TABLET, DELAYED RELEASE ORAL
Qty: 60 TABLET | Refills: 2 | Status: SHIPPED | OUTPATIENT
Start: 2022-04-18 | End: 2022-04-22

## 2022-04-22 DIAGNOSIS — K21.9 GASTROESOPHAGEAL REFLUX DISEASE WITHOUT ESOPHAGITIS: ICD-10-CM

## 2022-04-22 RX ORDER — PANTOPRAZOLE SODIUM 40 MG/1
TABLET, DELAYED RELEASE ORAL
Qty: 60 TABLET | Refills: 2 | Status: ON HOLD | OUTPATIENT
Start: 2022-04-22 | End: 2022-09-11 | Stop reason: HOSPADM

## 2022-04-29 DIAGNOSIS — O21.9 NAUSEA/VOMITING IN PREGNANCY: ICD-10-CM

## 2022-04-29 DIAGNOSIS — R10.13 DYSPEPSIA: ICD-10-CM

## 2022-04-29 DIAGNOSIS — R10.13 EPIGASTRIC PAIN: ICD-10-CM

## 2022-05-02 RX ORDER — DOXYLAMINE SUCCINATE AND PYRIDOXINE HYDROCHLORIDE, DELAYED RELEASE TABLETS 10 MG/10 MG 10; 10 MG/1; MG/1
TABLET, DELAYED RELEASE ORAL
Qty: 60 TABLET | Refills: 2 | Status: ON HOLD | OUTPATIENT
Start: 2022-05-02 | End: 2022-09-11 | Stop reason: HOSPADM

## 2022-05-13 DIAGNOSIS — R10.13 EPIGASTRIC PAIN: ICD-10-CM

## 2022-05-13 DIAGNOSIS — R10.13 DYSPEPSIA: ICD-10-CM

## 2022-05-13 RX ORDER — DICYCLOMINE HCL 20 MG
TABLET ORAL
Qty: 120 TABLET | Refills: 0 | Status: SHIPPED | OUTPATIENT
Start: 2022-05-13 | End: 2022-05-16

## 2022-05-16 RX ORDER — DICYCLOMINE HCL 20 MG
TABLET ORAL
Qty: 360 TABLET | Refills: 0 | Status: ON HOLD | OUTPATIENT
Start: 2022-05-16 | End: 2022-09-11 | Stop reason: HOSPADM

## 2022-08-12 LAB — GBS, EXTERNAL RESULT: NEGATIVE

## 2022-08-26 ENCOUNTER — HOSPITAL ENCOUNTER (OUTPATIENT)
Age: 27
Discharge: HOME | End: 2022-08-26
Attending: OBSTETRICS & GYNECOLOGY | Admitting: OBSTETRICS & GYNECOLOGY
Payer: COMMERCIAL

## 2022-08-26 VITALS
DIASTOLIC BLOOD PRESSURE: 70 MMHG | HEART RATE: 97 BPM | RESPIRATION RATE: 18 BRPM | TEMPERATURE: 97.6 F | SYSTOLIC BLOOD PRESSURE: 119 MMHG | OXYGEN SATURATION: 97 %

## 2022-08-26 PROCEDURE — 59025 FETAL NON-STRESS TEST: CPT

## 2022-08-26 PROCEDURE — 99215 OFFICE O/P EST HI 40 MIN: CPT

## 2022-08-26 PROCEDURE — 99213 OFFICE O/P EST LOW 20 MIN: CPT

## 2022-08-26 NOTE — FLOWSHEET NOTE
Patient at 37.5 weeks, ambulatory to triage for evaluation of possible rupture of membranes. Pt felt small gush x1 clear fluid today at home at 1700. Patient given gown to change into and oriented to room. Plan of care reviewed. Patient verbalized understanding. EFM applied with consent to central monitor bank with alarms on. Uterus soft and non-tender btwn irregular/mild cx. Positive fetal movement per pt. Denies vaginal bleeding. Pt pad dry at this time in triage. Dr. Tawnya Aden called to bedside to evaluate.

## 2022-08-26 NOTE — PROGRESS NOTES
Department of Obstetrics and Gynecology  Triage Evaluation Note    SUBJECTIVE:  Nirali Smith is a 27 y.o.,  at 36w4d who presents for LOF. She experienced a small gush of fluid while cooking dinner. She denies vaginal bleeding or contractions, just low back pain that is coming and going. She states the baby is moving well. She denies fever, shortness of breath, palpitations, nausea, vomiting, diarrhea on ROS. I personally reviewed the past medical and surgical histories, as well as the problem list.    OBJECTIVE:  Vital Signs: /70   Pulse 97   Temp 97.6 °F (36.4 °C) (Oral)   Resp 18   LMP 2021   SpO2 97%   Appearance/Psychiatric: alert and oriented X3  Constitutional: appears well, no distress  Cardiovascular: She does not have edema. Respiratory:Respiratory effort is normal.  Gastrointestinal: soft, non tender, Gravid. The uterine size is equal to dates. Pelvic: Cervix closed/50/-3. NST read: 145, mod variability, +accels, no decels  [place]: no contraction    LABS:    Negative pool, nit, fern    ASSESSMENT AND PLAN:  27 y.o.  at 36w4d presenting with LOF    +FWB - cat 1 tracing  LOF - testing negative for PROM        - discussed monitoring and to call if she soaks through a pad at home  OK for discharge home      The patient will follow up in the office this week as scheduled. She was counseled to call or return for vaginal bleeding, regular contractions, leakage of fluid or decreased fetal movement.     Electronically signed by Leia Wellington MD on 2022 at 6:45 PM

## 2022-08-26 NOTE — DISCHARGE INSTRUCTIONS
Home Undelivered Discharge Instructions    After completion of the medical screening evaluation, it has been determined that a medical emergency does not exist and the patient is not in active labor. Therefore, the patient may be discharged home. After Discharge Orders:            Diet:  normal diet as tolerated    Rest: normal activity as tolerated\        Diet/Activity/Restrictions:  Regular  Limit caffeine consumption  Increase your fluid intake  Eat small meals-but often. Rise from laying/sitting position to standing slowly. Avoid laying on your back, sidelying positions are best, left side is preferred. Weigh yourself daily and write down what you weigh. If you had a vaginal exam you may have some bloody mucous or brown vaginal discharge. If your doctor/midwife has ordered antibiotics, get it filled right away and take all of the medication as it has been prescribed. When to call your doctor: If you have severe back pain. Period-like cramps that may come and go. May feel like baby is balling up. Low, dull backache, not relieved by rest.  Pressure in your vagina or lower abdomen that may feel like the baby is pushing down. Change in the type or amount of vaginal discharge. Abdominal cramps that may be accompanied by diarrhea. 4-5 uterine contractions in one hour. Fluid leaking from your vagina (may or may not be bloody)  If you have vaginal bleeding. If you have a temperature of 100.6 or more. If your baby has a decrease in activity. Less than 5 times in an hour. If you feel you are not getting better or you are concerned. If you develop a headache, not resolved with your usual interventions. If you have changes in your vision (blurring or spots in your vision). If you have burning with urination, urgency or frequency. If you have pain in your abdomen, up by your ribs.                 General Instructions:    Nausea and Vomiting  Keep dry toast/crackers with you to munch on  Eat small frequent meals  Try to keep something in your stomach (don't let your stomach get empty)  Take your time getting up  Avoid smells that make you feel sick    Travel  Wear seatbelts or safety/lap belts  Walk frequently, every 1-2 hours  Wear clothing that does not constrict and comfortable shoes  Keep a light snack (e.g. Dry crackers) with you at all times to prevent nausea  Drink plenty of water, low sodium and noncaffeinated drinks. DO NOT take any medication that is not approved by your physician first    Swelling (Edema)  Avoid standing for long periods, keep legs up when you can  When resting, lie on your side (left side is best)  Limit the amount of salty foods you eat  Try to wear support hose as much as possible    Exercise  Avoid situations that would cause you to become overheated  Exercise outdoors only if the weather is reasonable and not too hot  Do not over exert yourself when you exercise  Drink plenty of fluids, especially water  Wear good support hose, bra and shoes when exercising    Varicose Veins  Try to keep your legs elevated when you are sitting  When lying down, keep your legs elevated  Do not stand for long periods of time  When wearing stockings or socks, make sure they are not too tight and bind your legs  Wear support hose/stockings at all times  If you have a job where you sit a lot, get up periodically and walk around      Other instructions: Do kick counts once a day on your baby. Choose the time of day your baby is most active. Get in a comfortable lying or sitting position and time how long it takes to feel 10 kicks, twists, turns, swishes, or rolls.  Call your physician or midwife if there have not been 10 kicks in 1 hours    Call physician or midwife, return to Labor and Delivery, call 911, or go to the nearest Emergency Room if: increased leakage or fluid, decreased fetal movement, persistent low back pain or cramping, bleeding from vaginal area, difficulty urinating, pain with urination, difficulty breathing, new calf pain, persistent headache, or vision change        Fetal Movement Chart    A daily diary of your baby's movements provides useful information. Please reyes down anytime the baby moves during at least one convenient hour each day. Pick an hour when the baby is usually active. It is also important that you have a regular meal within two hours. Marii Lobo on your left side, in this position the circulation to the baby is improved, and count the number of movements. If the baby moves less than 5-6 times in an hour, or you are concerned about you or your baby call your doctor or midwife.         Date Time Counts Total Date Time Counts Total

## 2022-08-26 NOTE — FLOWSHEET NOTE
Triage discharge instructions reviewed with pt. Patient instructed to increase oral fluids to 8-10 glasses of water or fruit juice everyday, to resume regular diet and activity. All pregnancy warning signs and symptoms reviewed. Fetal kick counts reviewed. Patient aware of when to follow up with provider and all questions answered. Patient ambulatory home with instructions. Verbalized understanding, discharge papers signed by pt and RN. Denies questions. No new scripts. Pt to follow up at next scheduled apt-next wednesday.

## 2022-08-26 NOTE — FLOWSHEET NOTE
Patient in triage. Spec exam by Dr. Jenny aRmos. Patient's cervix is closed and effacement 50%. No pooling of fluid noted. Amnio test negative.  Fern test negative per MD.

## 2022-08-26 NOTE — FLOWSHEET NOTE
08/26/22 1830   Fetal Heart Rate   Mode External US   Baseline Rate 145 bpm   Baseline Classification Normal   Variability 6-25 BPM   Pattern Accelerations   Patient Feels Fetal Movement Yes   OB Bladder Status Non-distended   Multiple birth? No   Fetal Monitoring Strip   FMS Reviewed? Yes   FMS Reviewed By? AMISHA RN   Uterine Activity   UA Mode Palpation; [place]   Contractions Irregular  (irritabilty)   Contraction Frequency irregular  (Irregular)   Contraction Duration 60-80   Contraction Intensity Mild   Resting Tone Palpated Soft

## 2022-09-07 ENCOUNTER — APPOINTMENT (OUTPATIENT)
Dept: LABOR AND DELIVERY | Age: 27
End: 2022-09-07
Payer: COMMERCIAL

## 2022-09-07 ENCOUNTER — ANESTHESIA (OUTPATIENT)
Dept: LABOR AND DELIVERY | Age: 27
End: 2022-09-07
Payer: COMMERCIAL

## 2022-09-07 ENCOUNTER — HOSPITAL ENCOUNTER (INPATIENT)
Age: 27
LOS: 4 days | Discharge: HOME | End: 2022-09-11
Attending: OBSTETRICS & GYNECOLOGY | Admitting: OBSTETRICS & GYNECOLOGY
Payer: COMMERCIAL

## 2022-09-07 ENCOUNTER — ANESTHESIA EVENT (OUTPATIENT)
Dept: LABOR AND DELIVERY | Age: 27
End: 2022-09-07
Payer: COMMERCIAL

## 2022-09-07 DIAGNOSIS — G89.18 POST-OP PAIN: Primary | ICD-10-CM

## 2022-09-07 PROBLEM — Z3A.40 40 WEEKS GESTATION OF PREGNANCY: Status: ACTIVE | Noted: 2022-09-07

## 2022-09-07 LAB
ABO/RH: NORMAL
AMPHETAMINE SCREEN, URINE: NORMAL
ANTIBODY SCREEN: NORMAL
BARBITURATE SCREEN URINE: NORMAL
BASOPHILS ABSOLUTE: 0 K/UL (ref 0–0.2)
BASOPHILS RELATIVE PERCENT: 0.4 %
BENZODIAZEPINE SCREEN, URINE: NORMAL
BUPRENORPHINE URINE: NORMAL
CANNABINOID SCREEN URINE: NORMAL
COCAINE METABOLITE SCREEN URINE: NORMAL
EOSINOPHILS ABSOLUTE: 0.1 K/UL (ref 0–0.6)
EOSINOPHILS RELATIVE PERCENT: 0.6 %
FENTANYL SCREEN, URINE: NORMAL
HCT VFR BLD CALC: 38.5 % (ref 36–48)
HEMOGLOBIN: 13.1 G/DL (ref 12–16)
LYMPHOCYTES ABSOLUTE: 2.2 K/UL (ref 1–5.1)
LYMPHOCYTES RELATIVE PERCENT: 22.6 %
Lab: NORMAL
MCH RBC QN AUTO: 30.9 PG (ref 26–34)
MCHC RBC AUTO-ENTMCNC: 34.1 G/DL (ref 31–36)
MCV RBC AUTO: 90.8 FL (ref 80–100)
METHADONE SCREEN, URINE: NORMAL
MONOCYTES ABSOLUTE: 0.6 K/UL (ref 0–1.3)
MONOCYTES RELATIVE PERCENT: 6 %
NEUTROPHILS ABSOLUTE: 7 K/UL (ref 1.7–7.7)
NEUTROPHILS RELATIVE PERCENT: 70.4 %
OPIATE SCREEN URINE: NORMAL
OXYCODONE URINE: NORMAL
PDW BLD-RTO: 13.4 % (ref 12.4–15.4)
PH UA: 7
PHENCYCLIDINE SCREEN URINE: NORMAL
PLATELET # BLD: 262 K/UL (ref 135–450)
PMV BLD AUTO: 7.1 FL (ref 5–10.5)
RBC # BLD: 4.25 M/UL (ref 4–5.2)
WBC # BLD: 10 K/UL (ref 4–11)

## 2022-09-07 PROCEDURE — 85025 COMPLETE CBC W/AUTO DIFF WBC: CPT

## 2022-09-07 PROCEDURE — 6370000000 HC RX 637 (ALT 250 FOR IP): Performed by: OBSTETRICS & GYNECOLOGY

## 2022-09-07 PROCEDURE — 59025 FETAL NON-STRESS TEST: CPT

## 2022-09-07 PROCEDURE — 2580000003 HC RX 258: Performed by: OBSTETRICS & GYNECOLOGY

## 2022-09-07 PROCEDURE — A4216 STERILE WATER/SALINE, 10 ML: HCPCS | Performed by: OBSTETRICS & GYNECOLOGY

## 2022-09-07 PROCEDURE — 2500000003 HC RX 250 WO HCPCS: Performed by: OBSTETRICS & GYNECOLOGY

## 2022-09-07 PROCEDURE — 80307 DRUG TEST PRSMV CHEM ANLYZR: CPT

## 2022-09-07 PROCEDURE — 86850 RBC ANTIBODY SCREEN: CPT

## 2022-09-07 PROCEDURE — 86780 TREPONEMA PALLIDUM: CPT

## 2022-09-07 PROCEDURE — 1220000000 HC SEMI PRIVATE OB R&B

## 2022-09-07 PROCEDURE — 59200 INSERT CERVICAL DILATOR: CPT

## 2022-09-07 PROCEDURE — 86900 BLOOD TYPING SEROLOGIC ABO: CPT

## 2022-09-07 PROCEDURE — 86901 BLOOD TYPING SEROLOGIC RH(D): CPT

## 2022-09-07 RX ORDER — SODIUM CHLORIDE, SODIUM LACTATE, POTASSIUM CHLORIDE, CALCIUM CHLORIDE 600; 310; 30; 20 MG/100ML; MG/100ML; MG/100ML; MG/100ML
INJECTION, SOLUTION INTRAVENOUS CONTINUOUS
Status: DISCONTINUED | OUTPATIENT
Start: 2022-09-07 | End: 2022-09-11 | Stop reason: HOSPADM

## 2022-09-07 RX ORDER — SODIUM CHLORIDE 9 MG/ML
25 INJECTION, SOLUTION INTRAVENOUS PRN
Status: DISCONTINUED | OUTPATIENT
Start: 2022-09-07 | End: 2022-09-09 | Stop reason: SDUPTHER

## 2022-09-07 RX ORDER — SODIUM CHLORIDE, SODIUM LACTATE, POTASSIUM CHLORIDE, AND CALCIUM CHLORIDE .6; .31; .03; .02 G/100ML; G/100ML; G/100ML; G/100ML
1000 INJECTION, SOLUTION INTRAVENOUS PRN
Status: DISCONTINUED | OUTPATIENT
Start: 2022-09-07 | End: 2022-09-11 | Stop reason: HOSPADM

## 2022-09-07 RX ORDER — SODIUM CHLORIDE 0.9 % (FLUSH) 0.9 %
5-40 SYRINGE (ML) INJECTION PRN
Status: DISCONTINUED | OUTPATIENT
Start: 2022-09-07 | End: 2022-09-09 | Stop reason: SDUPTHER

## 2022-09-07 RX ORDER — ONDANSETRON 2 MG/ML
4 INJECTION INTRAMUSCULAR; INTRAVENOUS EVERY 6 HOURS PRN
Status: DISCONTINUED | OUTPATIENT
Start: 2022-09-07 | End: 2022-09-11 | Stop reason: HOSPADM

## 2022-09-07 RX ORDER — DOCUSATE SODIUM 100 MG/1
100 CAPSULE, LIQUID FILLED ORAL 2 TIMES DAILY
Status: DISCONTINUED | OUTPATIENT
Start: 2022-09-07 | End: 2022-09-09 | Stop reason: SDUPTHER

## 2022-09-07 RX ORDER — SODIUM CHLORIDE 0.9 % (FLUSH) 0.9 %
5-40 SYRINGE (ML) INJECTION EVERY 12 HOURS SCHEDULED
Status: DISCONTINUED | OUTPATIENT
Start: 2022-09-07 | End: 2022-09-09 | Stop reason: SDUPTHER

## 2022-09-07 RX ORDER — ACETAMINOPHEN 325 MG/1
650 TABLET ORAL EVERY 4 HOURS PRN
Status: DISCONTINUED | OUTPATIENT
Start: 2022-09-07 | End: 2022-09-11 | Stop reason: HOSPADM

## 2022-09-07 RX ORDER — MISOPROSTOL 200 UG/1
800 TABLET ORAL PRN
Status: DISCONTINUED | OUTPATIENT
Start: 2022-09-07 | End: 2022-09-11 | Stop reason: HOSPADM

## 2022-09-07 RX ORDER — TERBUTALINE SULFATE 1 MG/ML
0.25 INJECTION, SOLUTION SUBCUTANEOUS
Status: ACTIVE | OUTPATIENT
Start: 2022-09-07 | End: 2022-09-07

## 2022-09-07 RX ORDER — METHYLERGONOVINE MALEATE 0.2 MG/ML
200 INJECTION INTRAVENOUS PRN
Status: DISCONTINUED | OUTPATIENT
Start: 2022-09-07 | End: 2022-09-11 | Stop reason: HOSPADM

## 2022-09-07 RX ORDER — SODIUM CHLORIDE, SODIUM LACTATE, POTASSIUM CHLORIDE, AND CALCIUM CHLORIDE .6; .31; .03; .02 G/100ML; G/100ML; G/100ML; G/100ML
500 INJECTION, SOLUTION INTRAVENOUS PRN
Status: DISCONTINUED | OUTPATIENT
Start: 2022-09-07 | End: 2022-09-11 | Stop reason: HOSPADM

## 2022-09-07 RX ORDER — CARBOPROST TROMETHAMINE 250 UG/ML
250 INJECTION, SOLUTION INTRAMUSCULAR PRN
Status: DISCONTINUED | OUTPATIENT
Start: 2022-09-07 | End: 2022-09-11 | Stop reason: HOSPADM

## 2022-09-07 RX ADMIN — Medication 25 MCG: at 22:10

## 2022-09-07 RX ADMIN — FAMOTIDINE 20 MG: 10 INJECTION INTRAVENOUS at 20:40

## 2022-09-07 RX ADMIN — Medication 25 MCG: at 18:09

## 2022-09-07 RX ADMIN — SODIUM CHLORIDE, POTASSIUM CHLORIDE, SODIUM LACTATE AND CALCIUM CHLORIDE: 600; 310; 30; 20 INJECTION, SOLUTION INTRAVENOUS at 17:47

## 2022-09-07 ASSESSMENT — ENCOUNTER SYMPTOMS: SHORTNESS OF BREATH: 0

## 2022-09-07 NOTE — FLOWSHEET NOTE
Plan of care discussed with patient. Patient verbalized understanding. Cytotec placed vaginally at this time. Instructed to lay on her side for one hour and remain in bed.

## 2022-09-07 NOTE — PLAN OF CARE
Problem: Pain  Goal: Verbalizes/displays adequate comfort level or baseline comfort level  Outcome: Progressing     Problem: Vaginal Birth or  Section  Goal: Fetal and maternal status remain reassuring during the birth process  Description:  Birth OB-Pregnancy care plan goal which identifies if the fetal and maternal status remain reassuring during the birth process  Outcome: Progressing     Problem: Safety - Adult  Goal: Free from fall injury  Outcome: Progressing     Problem: Chronic Conditions and Co-morbidities  Goal: Patient's chronic conditions and co-morbidity symptoms are monitored and maintained or improved  Outcome: Progressing

## 2022-09-07 NOTE — FLOWSHEET NOTE
09/07/22 1735   Fetal Heart Rate   Mode External US   Baseline Rate 140 bpm   Baseline Classification Normal   Variability 6-25 BPM   Pattern Accelerations   Patient Feels Fetal Movement Yes   OB Bladder Status Non-distended;Voiding   OB Bladder Intervention Voiding with Relief   Fetal Monitoring Strip   FMS Reviewed? Yes   FMS Reviewed By? tw   Uterine Activity   UA Mode Palpation; [place]   Contractions Irregular   Contraction Frequency 5-7 w/ irritability   Contraction Duration    Contraction Intensity Mild   Resting Tone Palpated Soft     NST

## 2022-09-07 NOTE — ANESTHESIA PRE PROCEDURE
Department of Anesthesiology  Preprocedure Note       Name:  Diedra Simmonds   Age:  27 y. o.  :  1995                                          MRN:  [id number]         Date:  2022        Procedure: Labor Epidural    Medications prior to admission:   Prior to Admission medications    Medication Sig Start Date End Date Taking?  Authorizing Provider   dicyclomine (BENTYL) 20 MG tablet TAKE 1 TABLET BY MOUTH FOUR TIMES DAILY  Patient not taking: Reported on 2022   ERASMO Sanderson CNP   doxylamine-pyridoxine 10-10 MG TBEC TAKE 1 TABLET BY MOUTH EVERY MORNING, 1 TABLET EVERY AFTERNOON AND 2 TABLETS EVERY EVENING( THREE TIMES DAILY) FOR PREVENTION OF MORNING SICKNESS  Patient not taking: Reported on 2022   ERASMO Sanderson CNP   pantoprazole (PROTONIX) 40 MG tablet TAKE 1 TABLET BY MOUTH TWICE DAILY  Patient not taking: Reported on 2022   ERASMO Sanderson CNP   famotidine (PEPCID) 20 MG tablet TAKE 1 TABLET BY MOUTH EVERY NIGHT AS NEEDED FOR REFLUX  Patient not taking: Reported on 2022 3/21/22   ERASMO Sanderson CNP   ondansetron (ZOFRAN ODT) 4 MG disintegrating tablet Take 1 tablet by mouth every 8 hours as needed for Nausea  Patient not taking: Reported on 2022 3/2/22   ERASMO Sanderson CNP   pyridoxine (RA VITAMIN B-6) 50 MG tablet Take 1 tablet by mouth daily  Patient not taking: Reported on 2022   Rosa Maria Lee MD   sucralfate (CARAFATE) 1 GM/10ML suspension Take 10 mLs by mouth 4 times daily  Patient not taking: Reported on 2022   ERASMO Sanderson CNP   aluminum & magnesium hydroxide-simethicone (MYLANTA) 677-519-59 MG/5ML SUSP suspension Take 5 mLs by mouth every 6 hours as needed for Indigestion  Patient not taking: Reported on 2022   Maria G Westbrook PA-C   Prenatal Vit-Fe Fumarate-FA (PRENATAL VITAMIN AND MINERAL) 28-0.8 MG TABS Take 1 tablet by mouth daily 1/26/22   ALEXYS Hernadez   vitamin D (ERGOCALCIFEROL) 1.25 MG (13997 UT) CAPS capsule Take 1 capsule by mouth once a week  Patient not taking: Reported on 8/26/2022 1/14/22   ERASMO Blackwell CNP   norethindrone (MICRONOR) 0.35 MG tablet Take 1 tablet by mouth daily  Patient not taking: No sig reported 12/1/21   Historical Provider, MD   albuterol sulfate  (90 Base) MCG/ACT inhaler Inhale 2 puffs into the lungs every 6 hours as needed for Wheezing  Patient not taking: No sig reported    Historical Provider, MD       Current medications:    Current Facility-Administered Medications   Medication Dose Route Frequency Provider Last Rate Last Admin    lactated ringers infusion   IntraVENous Continuous Roman Gutierrez  mL/hr at 09/07/22 1747 New Bag at 09/07/22 1747    lactated ringers bolus  500 mL IntraVENous PRN Roman Gutierrez MD        Or    lactated ringers bolus  1,000 mL IntraVENous PRN Roman Gutierrez MD        sodium chloride flush 0.9 % injection 5-40 mL  5-40 mL IntraVENous 2 times per day Roman Gutierrez MD        sodium chloride flush 0.9 % injection 5-40 mL  5-40 mL IntraVENous PRN Roman Gutierrez MD        0.9 % sodium chloride infusion  25 mL IntraVENous PRN Roman Gutierrez MD        methylergonovine (METHERGINE) injection 200 mcg  200 mcg IntraMUSCular PRN Roman Gutierrez MD        carboprost (HEMABATE) injection 250 mcg  250 mcg IntraMUSCular PRN Roman Gutierrez MD        miSOPROStol (CYTOTEC) tablet 800 mcg  800 mcg Rectal PRN Roman Gutierrez MD        tranexamic acid (CYCLOKAPRON) 1000 mg in sodium chloride 0.9 % 50 mL IVPB  1,000 mg IntraVENous Once PRN Roman Gutierrez MD        oxytocin (PITOCIN) 30 units in 500 mL infusion  87.3 tyrel-units/min IntraVENous Continuous PRN Roman Gutierrez MD        And    oxytocin (PITOCIN) 10 unit bolus from the bag  10 Units IntraVENous PRN Roman Gutierrez MD        ondansetron [facility] injection 4 mg  4 mg IntraVENous Q6H PRN Phyllis Crowder MD        docusate sodium (COLACE) capsule 100 mg  100 mg Oral BID Phyllis Crowder MD        [START ON 9/8/2022] oxytocin (PITOCIN) 30 units in 500 mL infusion  1-20 tyrel-units/min IntraVENous Continuous Phyllis Crowder MD        terbutaline (BRETHINE) injection 0.25 mg  0.25 mg SubCUTAneous Once PRN Phyllis Crowder MD        acetaminophen (TYLENOL) tablet 650 mg  650 mg Oral Q4H PRN Phyllis Crowder MD        miSOPROStol (CYTOTEC) pre-split tablet TABS 25 mcg  25 mcg Vaginal Q4H Phyllis Crowder MD   25 mcg at 09/07/22 1809       Allergies:     Allergies   Allergen Reactions    Latex      Irritation    Other Anaphylaxis    Shrimp Flavor        Problem List:    Patient Active Problem List   Diagnosis Code    Gastroesophageal reflux disease without esophagitis K21.9    Hyperbilirubinemia E80.6    Elevated LFTs R79.89    Intractable nausea and vomiting R11.2    Syncope R55    Headache R51.9    Syncope and collapse R55    Endometriosis N80.9    Irregular menstrual cycle N92.6    40 weeks gestation of pregnancy Z3A.40       Past Medical History:        Diagnosis Date    Asthma     Interstitial cystitis     PCOS (polycystic ovarian syndrome)     Stomach ulcer 2022       Past Surgical History:        Procedure Laterality Date    LAPAROSCOPY         Social History:    Social History     Tobacco Use    Smoking status: Never    Smokeless tobacco: Never   Substance Use Topics    Alcohol use: Never                                Counseling given: Not Answered      Vital Signs (Current):   Vitals:    09/07/22 1717 09/07/22 1720   BP:  120/62   Pulse:  91   Resp:  16   Temp:  36.8 °C (98.3 °F)   TempSrc:  Oral   Weight: 223 lb (101.2 kg)    Height: 5' 4\" (1.626 m)                                               BP Readings from Last 3 Encounters:   09/07/22 120/62   08/26/22 119/70   03/10/22 126/74       NPO Status: BMI:   Wt Readings from Last 3 Encounters:   09/07/22 223 lb (101.2 kg)   03/10/22 180 lb (81.6 kg)   02/23/22 182 lb 5.1 oz (82.7 kg)     Body mass index is 38.28 kg/m².     CBC:   Lab Results   Component Value Date/Time    WBC 10.0 09/07/2022 05:30 PM    RBC 4.25 09/07/2022 05:30 PM    HGB 13.1 09/07/2022 05:30 PM    HCT 38.5 09/07/2022 05:30 PM    MCV 90.8 09/07/2022 05:30 PM    RDW 13.4 09/07/2022 05:30 PM     09/07/2022 05:30 PM       CMP:   Lab Results   Component Value Date/Time     02/23/2022 03:36 PM    K 3.9 02/23/2022 03:36 PM     02/23/2022 03:36 PM    CO2 12 02/23/2022 03:36 PM    BUN 6 02/23/2022 03:36 PM    CREATININE 0.6 02/23/2022 03:36 PM    GFRAA >60 02/23/2022 03:36 PM    AGRATIO 1.1 02/23/2022 03:36 PM    LABGLOM >60 02/23/2022 03:36 PM    GLUCOSE 66 02/23/2022 03:36 PM    PROT 6.9 02/23/2022 03:36 PM    CALCIUM 9.3 02/23/2022 03:36 PM    BILITOT 0.4 02/23/2022 03:36 PM    ALKPHOS 33 02/23/2022 03:36 PM    AST 20 02/23/2022 03:36 PM    ALT 11 02/23/2022 03:36 PM       Coags:   Lab Results   Component Value Date/Time    PROTIME 12.1 12/07/2021 06:10 AM    INR 1.07 12/07/2021 06:10 AM    APTT 38.2 12/02/2021 09:45 PM       HCG (If Applicable):   Lab Results   Component Value Date    PREGTESTUR POSITIVE 01/26/2022        COVID-19 Screening (If Applicable):   Lab Results   Component Value Date/Time    COVID19 Not Detected 12/02/2021 10:05 PM           Anesthesia Evaluation  Patient summary reviewed and Nursing notes reviewed no history of anesthetic complications:   Airway: Mallampati: II  TM distance: >3 FB   Neck ROM: full  Mouth opening: > = 3 FB   Dental: normal exam         Pulmonary:Negative Pulmonary ROS and normal exam  breath sounds clear to auscultation  (+) asthma: allergic asthma,     (-) shortness of breath, recent URI and sleep apnea                           Cardiovascular:Negative CV ROS  Exercise tolerance: good (>4 METS),       (-) hypertension and CAD      Rhythm: regular  Rate: normal                    Neuro/Psych:   Negative Neuro/Psych ROS  (+) headaches (No medications. Had migraines as teenager.): migraine headaches,             GI/Hepatic/Renal: Neg GI/Hepatic/Renal ROS  (+) GERD: well controlled,      (-) liver disease and no renal disease       Endo/Other: Negative Endo/Other ROS       (-) diabetes mellitus               Abdominal:   (+) obese,           Vascular: negative vascular ROS. - DVT and PE. Other Findings:           Anesthesia Plan      epidural     ASA 2     (Plan for a labor epidural. Plan and risks discussed with patient including but not limited to changes in HR, B/P and oxygen status; N/V; infection; headache; inadequate block or need to replace epidural. Questions answered. Patient agrees to [facility].       )        Anesthetic plan and risks discussed with patient. OB History        1    Para        Term                AB        Living           SAB        IAB        Ectopic        Molar        Multiple        Live Births                    Hari Tanner is a 27y.o. year-old female admitted to [facility], for IOL for post dates. Gestational age is 44w3d. Her Body mass index is 38.28 kg/m². She was seen, examined and her chart was reviewed (including anesthesia, drug and allergy history). No interval changes are noted to her history and physical examination. (except as noted above).     Risks/benefits/alternatives of both neuraxial and general anesthesia were discussed and she agrees to proceed at the direction of the care team.    ERASMO Puri - BENITO  2022  6:28 PM      ERASMO Puri CRNA   2022

## 2022-09-08 LAB — TOTAL SYPHILLIS IGG/IGM: NORMAL

## 2022-09-08 PROCEDURE — 3700000025 EPIDURAL BLOCK: Performed by: ANESTHESIOLOGY

## 2022-09-08 PROCEDURE — 2500000003 HC RX 250 WO HCPCS: Performed by: NURSE ANESTHETIST, CERTIFIED REGISTERED

## 2022-09-08 PROCEDURE — 6360000002 HC RX W HCPCS: Performed by: OBSTETRICS & GYNECOLOGY

## 2022-09-08 PROCEDURE — 51702 INSERT TEMP BLADDER CATH: CPT

## 2022-09-08 PROCEDURE — 2580000003 HC RX 258: Performed by: OBSTETRICS & GYNECOLOGY

## 2022-09-08 PROCEDURE — 6370000000 HC RX 637 (ALT 250 FOR IP): Performed by: OBSTETRICS & GYNECOLOGY

## 2022-09-08 PROCEDURE — 59025 FETAL NON-STRESS TEST: CPT

## 2022-09-08 PROCEDURE — 2500000003 HC RX 250 WO HCPCS

## 2022-09-08 PROCEDURE — 2580000003 HC RX 258

## 2022-09-08 PROCEDURE — 1220000000 HC SEMI PRIVATE OB R&B

## 2022-09-08 RX ORDER — NALOXONE HYDROCHLORIDE 0.4 MG/ML
INJECTION, SOLUTION INTRAMUSCULAR; INTRAVENOUS; SUBCUTANEOUS PRN
Status: DISCONTINUED | OUTPATIENT
Start: 2022-09-08 | End: 2022-09-08 | Stop reason: SDUPTHER

## 2022-09-08 RX ORDER — LIDOCAINE HYDROCHLORIDE 10 MG/ML
INJECTION, SOLUTION INFILTRATION; PERINEURAL PRN
Status: DISCONTINUED | OUTPATIENT
Start: 2022-09-08 | End: 2022-09-09 | Stop reason: SDUPTHER

## 2022-09-08 RX ORDER — LIDOCAINE HYDROCHLORIDE AND EPINEPHRINE 15; 5 MG/ML; UG/ML
INJECTION, SOLUTION EPIDURAL PRN
Status: DISCONTINUED | OUTPATIENT
Start: 2022-09-08 | End: 2022-09-09 | Stop reason: SDUPTHER

## 2022-09-08 RX ORDER — ONDANSETRON 2 MG/ML
4 INJECTION INTRAMUSCULAR; INTRAVENOUS EVERY 6 HOURS PRN
Status: DISCONTINUED | OUTPATIENT
Start: 2022-09-08 | End: 2022-09-08 | Stop reason: SDUPTHER

## 2022-09-08 RX ORDER — NALOXONE HYDROCHLORIDE 0.4 MG/ML
INJECTION, SOLUTION INTRAMUSCULAR; INTRAVENOUS; SUBCUTANEOUS PRN
Status: DISCONTINUED | OUTPATIENT
Start: 2022-09-08 | End: 2022-09-09 | Stop reason: HOSPADM

## 2022-09-08 RX ORDER — NALBUPHINE HCL 10 MG/ML
5 AMPUL (ML) INJECTION
Status: DISCONTINUED | OUTPATIENT
Start: 2022-09-08 | End: 2022-09-11 | Stop reason: HOSPADM

## 2022-09-08 RX ORDER — BUPIVACAINE HYDROCHLORIDE 2.5 MG/ML
INJECTION, SOLUTION EPIDURAL; INFILTRATION; INTRACAUDAL PRN
Status: DISCONTINUED | OUTPATIENT
Start: 2022-09-08 | End: 2022-09-09 | Stop reason: SDUPTHER

## 2022-09-08 RX ORDER — DEXTROSE, SODIUM CHLORIDE, SODIUM LACTATE, POTASSIUM CHLORIDE, AND CALCIUM CHLORIDE 5; .6; .31; .03; .02 G/100ML; G/100ML; G/100ML; G/100ML; G/100ML
INJECTION, SOLUTION INTRAVENOUS CONTINUOUS
Status: DISCONTINUED | OUTPATIENT
Start: 2022-09-08 | End: 2022-09-11 | Stop reason: HOSPADM

## 2022-09-08 RX ORDER — ONDANSETRON 2 MG/ML
4 INJECTION INTRAMUSCULAR; INTRAVENOUS EVERY 6 HOURS PRN
Status: DISCONTINUED | OUTPATIENT
Start: 2022-09-08 | End: 2022-09-09 | Stop reason: HOSPADM

## 2022-09-08 RX ORDER — DEXTROSE, SODIUM CHLORIDE, SODIUM LACTATE, POTASSIUM CHLORIDE, AND CALCIUM CHLORIDE 5; .6; .31; .03; .02 G/100ML; G/100ML; G/100ML; G/100ML; G/100ML
INJECTION, SOLUTION INTRAVENOUS
Status: COMPLETED
Start: 2022-09-08 | End: 2022-09-08

## 2022-09-08 RX ADMIN — Medication 10 MILLI-UNITS/MIN: at 21:31

## 2022-09-08 RX ADMIN — SODIUM CHLORIDE, POTASSIUM CHLORIDE, SODIUM LACTATE AND CALCIUM CHLORIDE: 600; 310; 30; 20 INJECTION, SOLUTION INTRAVENOUS at 01:42

## 2022-09-08 RX ADMIN — SODIUM CHLORIDE, POTASSIUM CHLORIDE, SODIUM LACTATE AND CALCIUM CHLORIDE 500 ML: 600; 310; 30; 20 INJECTION, SOLUTION INTRAVENOUS at 10:44

## 2022-09-08 RX ADMIN — Medication 1 MILLI-UNITS/MIN: at 10:11

## 2022-09-08 RX ADMIN — SODIUM CHLORIDE, SODIUM LACTATE, POTASSIUM CHLORIDE, CALCIUM CHLORIDE AND DEXTROSE MONOHYDRATE: 5; 600; 310; 30; 20 INJECTION, SOLUTION INTRAVENOUS at 10:13

## 2022-09-08 RX ADMIN — LIDOCAINE HYDROCHLORIDE AND EPINEPHRINE 3 ML: 15; 5 INJECTION, SOLUTION EPIDURAL at 10:52

## 2022-09-08 RX ADMIN — Medication 12 ML/HR: at 11:20

## 2022-09-08 RX ADMIN — Medication 25 MCG: at 02:10

## 2022-09-08 RX ADMIN — Medication 25 MCG: at 06:10

## 2022-09-08 RX ADMIN — SODIUM CHLORIDE, SODIUM LACTATE, POTASSIUM CHLORIDE, CALCIUM CHLORIDE AND DEXTROSE MONOHYDRATE: 5; 600; 310; 30; 20 INJECTION, SOLUTION INTRAVENOUS at 20:08

## 2022-09-08 RX ADMIN — SODIUM CHLORIDE, POTASSIUM CHLORIDE, SODIUM LACTATE AND CALCIUM CHLORIDE: 600; 310; 30; 20 INJECTION, SOLUTION INTRAVENOUS at 09:50

## 2022-09-08 RX ADMIN — SODIUM CHLORIDE, POTASSIUM CHLORIDE, SODIUM LACTATE AND CALCIUM CHLORIDE: 600; 310; 30; 20 INJECTION, SOLUTION INTRAVENOUS at 22:47

## 2022-09-08 RX ADMIN — LIDOCAINE HYDROCHLORIDE 5 ML: 10 INJECTION, SOLUTION INFILTRATION; PERINEURAL at 10:47

## 2022-09-08 RX ADMIN — BUPIVACAINE HYDROCHLORIDE 10 ML: 2.5 INJECTION, SOLUTION EPIDURAL; INFILTRATION; INTRACAUDAL at 10:57

## 2022-09-08 RX ADMIN — Medication 12 ML/HR: at 19:47

## 2022-09-08 ASSESSMENT — PAIN DESCRIPTION - ORIENTATION: ORIENTATION: LOWER

## 2022-09-08 ASSESSMENT — PAIN DESCRIPTION - PAIN TYPE: TYPE: ACUTE PAIN

## 2022-09-08 ASSESSMENT — PAIN DESCRIPTION - LOCATION: LOCATION: ABDOMEN

## 2022-09-08 ASSESSMENT — PAIN DESCRIPTION - FREQUENCY: FREQUENCY: INTERMITTENT

## 2022-09-08 ASSESSMENT — PAIN DESCRIPTION - ONSET: ONSET: ON-GOING

## 2022-09-08 ASSESSMENT — PAIN DESCRIPTION - DESCRIPTORS: DESCRIPTORS: CRAMPING;ACHING;DISCOMFORT

## 2022-09-08 ASSESSMENT — PAIN - FUNCTIONAL ASSESSMENT: PAIN_FUNCTIONAL_ASSESSMENT: ACTIVITIES ARE NOT PREVENTED

## 2022-09-08 ASSESSMENT — PAIN SCALES - GENERAL: PAINLEVEL_OUTOF10: 6

## 2022-09-08 NOTE — FLOWSHEET NOTE
Pt contractions have been coupling and Pt has been C/O lowe back pain. Pt assisted into hands and knee positioning in bed supported with pillows and a peanut ball. Pt states that she is comfortable at this time. Will continue to monitor.

## 2022-09-08 NOTE — FLOWSHEET NOTE
09/08/22 0200   Fetal Heart Rate   Mode External US   Baseline Rate 130 bpm   Baseline Classification Normal   Variability 6-25 BPM   Pattern Accelerations   Patient Feels Fetal Movement Yes   Interventions RN at Bedside   Fetal Monitoring Strip   FMS Reviewed? Yes   FMS Reviewed By? MPH, RN   Uterine Activity   UA Mode Palpation; [place]   Contractions Irregular   Contraction Frequency 1-4   Contraction Duration 30-80   Contraction Intensity Mild   Resting Tone Palpated Soft

## 2022-09-08 NOTE — PLAN OF CARE
Problem: Pain  Goal: Verbalizes/displays adequate comfort level or baseline comfort level  2022 by Mike Hatch RN  Outcome: Progressing  2022 by Ian Nation. Loni Jeffers RN  Outcome: Progressing     Problem: Vaginal Birth or  Section  Goal: Fetal and maternal status remain reassuring during the birth process  Description:  Birth OB-Pregnancy care plan goal which identifies if the fetal and maternal status remain reassuring during the birth process  2022 by Mike Hatch RN  Outcome: Progressing  2022 by Ian Nation. Loni Jeffers RN  Outcome: Progressing     Problem: Safety - Adult  Goal: Free from fall injury  2022 by Mike Hatch RN  Outcome: Progressing  2022 by Ian Nation. Loni Jeffers RN  Outcome: Progressing     Problem: Chronic Conditions and Co-morbidities  Goal: Patient's chronic conditions and co-morbidity symptoms are monitored and maintained or improved  2022 by Mike Hatch RN  Outcome: Progressing  2022 by Ian Nation.  Loni Jeffers RN  Outcome: Progressing

## 2022-09-08 NOTE — FLOWSHEET NOTE
Shift handoff received from SABA Youngblood RN. Pt currently sleeping/resting, pt instructed to call out if needing anything before morning/ shift assessments.

## 2022-09-08 NOTE — FLOWSHEET NOTE
Pt C/O 7/10 pain with contractions. RN brought a birthing ball to the bedside and instructed Pt how to sit and rock on the ball to help with contraction and hip pain. Pt states she immediately felt better after sitting on the ball and demonstrated rocking and breathing well through contractions. Pt is satisfied at this time. Call light is within reach. Pt states she will call for any needs. RN will continue to monitor.

## 2022-09-08 NOTE — FLOWSHEET NOTE
Pt assisted out of hand and knee positioning and turned onto her left side with a peanut ball between her legs. Pt states that she is comfortable at this time and is breathing well during and able to relax between contractions. Will continue to monitor.

## 2022-09-08 NOTE — PROGRESS NOTES
Comfortable  Ctrxs q 2-3 min but weak, on 2 mu of pitocin   EFM: cat1   SVE: 3/80/-3, ceph  P: start titration of pitocin 1x1 up to 4 mu/min. Labor expectations discussed with patient and her mother in law.

## 2022-09-08 NOTE — PLAN OF CARE
Problem: Pain  Goal: Verbalizes/displays adequate comfort level or baseline comfort level  2022 by Carlos Chau RN  Outcome: Progressing     Problem: Vaginal Birth or  Section  Goal: Fetal and maternal status remain reassuring during the birth process  Description:  Birth OB-Pregnancy care plan goal which identifies if the fetal and maternal status remain reassuring during the birth process  2022 by Carlos Chau RN  Outcome: Progressing     Problem: Safety - Adult  Goal: Free from fall injury  2022 by Carlos Chau RN  Outcome: Progressing     Problem: Chronic Conditions and Co-morbidities  Goal: Patient's chronic conditions and co-morbidity symptoms are monitored and maintained or improved  2022 by Carlos Chau RN  Outcome: Progressing

## 2022-09-08 NOTE — FLOWSHEET NOTE
Pt with some relief after epidural, then pain started to increase again. [facility] CRNA called and to bedside. CRNA unable to push medication through epidural line, line found to be kinked. Will replace last epidural with new. Pt sitting up for epi at this time with ZBIGNIEW Beth CRNA. TO performed.

## 2022-09-08 NOTE — FLOWSHEET NOTE
Santino Olson & RN at bedside. Pt positioned for epidural.   Epidural placed @ 1051. Pt tolerated well. Test Dose given @ 1052. / 63. No reported side effects noted at this time.

## 2022-09-08 NOTE — ANESTHESIA PROCEDURE NOTES
Epidural Block    Patient location during procedure: OB  Start time: 9/8/2022 10:45 AM  End time: 9/8/2022 11:00 AM  Reason for block: labor epidural  Staffing  Resident/CRNA: Matt Urena [facility], APRN - CRNA  Epidural  Patient position: sitting  Prep: ChloraPrep  Patient monitoring: cardiac monitor, continuous pulse ox and frequent blood pressure checks  Approach: midline  Location: L3-4  Injection technique: MARICRUZ air  Guidance: paresthesia technique  Provider prep: mask and sterile gloves  Needle  Needle type: Tuohy   Needle gauge: 17 G  Needle length: 3.5 in  Needle insertion depth: 5 cm  Catheter type: side hole  Catheter size: 19 G  Catheter at skin depth: 11 cm  Test dose: negativeCatheter Secured: tegaderm and tape  Assessment  Sensory level: T8  Hemodynamics: stable  Attempts: 1  Outcomes: uncomplicated and patient tolerated procedure well  Preanesthetic Checklist  Completed: patient identified, IV checked, site marked, risks and benefits discussed, surgical/procedural consents, equipment checked, pre-op evaluation, timeout performed, anesthesia consent given, oxygen available, monitors applied/VS acknowledged, fire risk safety assessment completed and verbalized and blood product R/B/A discussed and consented

## 2022-09-08 NOTE — H&P
Department of Obstetrics and Gynecology   Obstetrics History and Physical        CHIEF COMPLAINT:  IOL at term    HISTORY OF PRESENT ILLNESS:    Sandra Carcamo  is a 27 y.o.  female at 41w4d presents with a chief complaint as above and is being admitted for IOL    Estimated Due Date: Estimated Date of Delivery: 22    PRENATAL CARE: Complicated by: 1. H/o PCOS  2.  H/o Interstitial cystitis     PAST OB HISTORY:  OB History          1    Para        Term                AB        Living             SAB        IAB        Ectopic        Molar        Multiple        Live Births                  Past Medical History:        Diagnosis Date    Asthma     Interstitial cystitis     PCOS (polycystic ovarian syndrome)     Stomach ulcer      Past Surgical History:        Procedure Laterality Date    LAPAROSCOPY       Allergies:  Latex, Other, and Shrimp flavor  Social History:    Social History     Socioeconomic History    Marital status:      Spouse name: Not on file    Number of children: Not on file    Years of education: Not on file    Highest education level: Not on file   Occupational History    Not on file   Tobacco Use    Smoking status: Never    Smokeless tobacco: Never   Vaping Use    Vaping Use: Never used   Substance and Sexual Activity    Alcohol use: Never    Drug use: Never    Sexual activity: Yes   Other Topics Concern    Not on file   Social History Narrative    Not on file     Social Determinants of Health     Financial Resource Strain: Low Risk     Difficulty of Paying Living Expenses: Not hard at all   Food Insecurity: No Food Insecurity    Worried About Running Out of Food in the Last Year: Never true    Ran Out of Food in the Last Year: Never true   Transportation Needs: Not on file   Physical Activity: Not on file   Stress: Not on file   Social Connections: Not on file   Intimate Partner Violence: Not on file   Housing Stability: Not on file     Family History:       Problem Relation Age of Onset    Diabetes Mother     High Blood Pressure Mother     Diabetes Maternal Grandmother     High Blood Pressure Other      Medications Prior to Admission:  Medications Prior to Admission: dicyclomine (BENTYL) 20 MG tablet, TAKE 1 TABLET BY MOUTH FOUR TIMES DAILY (Patient not taking: Reported on 8/26/2022)  doxylamine-pyridoxine 10-10 MG TBEC, TAKE 1 TABLET BY MOUTH EVERY MORNING, 1 TABLET EVERY AFTERNOON AND 2 TABLETS EVERY EVENING( THREE TIMES DAILY) FOR PREVENTION OF MORNING SICKNESS (Patient not taking: Reported on 8/26/2022)  pantoprazole (PROTONIX) 40 MG tablet, TAKE 1 TABLET BY MOUTH TWICE DAILY (Patient not taking: Reported on 8/26/2022)  famotidine (PEPCID) 20 MG tablet, TAKE 1 TABLET BY MOUTH EVERY NIGHT AS NEEDED FOR REFLUX (Patient not taking: Reported on 8/26/2022)  ondansetron (ZOFRAN ODT) 4 MG disintegrating tablet, Take 1 tablet by mouth every 8 hours as needed for Nausea (Patient not taking: Reported on 8/26/2022)  pyridoxine (RA VITAMIN B-6) 50 MG tablet, Take 1 tablet by mouth daily (Patient not taking: Reported on 8/26/2022)  sucralfate (CARAFATE) 1 GM/10ML suspension, Take 10 mLs by mouth 4 times daily (Patient not taking: Reported on 8/26/2022)  aluminum & magnesium hydroxide-simethicone (MYLANTA) 200-200-20 MG/5ML SUSP suspension, Take 5 mLs by mouth every 6 hours as needed for Indigestion (Patient not taking: Reported on 8/26/2022)  Prenatal Vit-Fe Fumarate-FA (PRENATAL VITAMIN AND MINERAL) 28-0.8 MG TABS, Take 1 tablet by mouth daily  vitamin D (ERGOCALCIFEROL) 1.25 MG (97441 UT) CAPS capsule, Take 1 capsule by mouth once a week (Patient not taking: Reported on 8/26/2022)  norethindrone (MICRONOR) 0.35 MG tablet, Take 1 tablet by mouth daily (Patient not taking: No sig reported)  albuterol sulfate  (90 Base) MCG/ACT inhaler, Inhale 2 puffs into the lungs every 6 hours as needed for Wheezing (Patient not taking: No sig reported)    REVIEW OF SYSTEMS:  negative PHYSICAL EXAM:    Vitals:    09/08/22 0138 09/08/22 0420 09/08/22 0553 09/08/22 0800   BP: 104/65  110/73 (!) 101/58   Pulse: 74  73 71   Resp: 16 18 18 16   Temp: 98.3 °F (36.8 °C)  97.8 °F (36.6 °C) 97.6 °F (36.4 °C)   TempSrc: Oral  Oral Oral   SpO2: 97%  97% 98%   Weight:       Height:         General appearance:  awake, alert, cooperative, no apparent distress, and appears stated age  Neurologic:  Awake, alert, oriented to name, place and time. Lungs:  No increased work of breathing, good air exchange  Abdomen:  Soft, non tender, gravid, fundal height consistent with the gestational age, EFW by Leopold's maneuvers is 3200 grams  Pelvis:  Adequate pelvis  Cervix: 1/thick, s/p cytotec x 4  Contraction frequency: every 4 minutes  Membranes:  intact  Labs: CBC:   Lab Results   Component Value Date/Time    WBC 10.0 09/07/2022 05:30 PM    RBC 4.25 09/07/2022 05:30 PM    HGB 13.1 09/07/2022 05:30 PM    HCT 38.5 09/07/2022 05:30 PM    MCV 90.8 09/07/2022 05:30 PM    MCH 30.9 09/07/2022 05:30 PM    MCHC 34.1 09/07/2022 05:30 PM    RDW 13.4 09/07/2022 05:30 PM     09/07/2022 05:30 PM    MPV 7.1 09/07/2022 05:30 PM       ASSESSMENT: 28 yo G1 @ 36 2/7 w   IOL at term       PLAN: Admit  Labor: Routine labor orders  Fetus: Reassuring  GBS: Negative    I have presented reasonable alternatives to the patient's proposed care, treatment, and services. The discussion I have done encompassed risks, benefits, and side effects related to the alternatives and the risks related to not receiving the proposed care, treatment, and services. All questions answered. Patient wishes to proceed. The surgical site was confirmed by the patient and me.       Electronically signed by Vincent Dominguez MD on 9/8/2022 at 8:35 AM

## 2022-09-08 NOTE — FLOWSHEET NOTE
Dr. Debra Suazo at bedside. SVE performed. 1-2/60/-2. AROM performed @1003 , clear fluid noted. Pt tolerated well. Orders to start pit and D5 LR.

## 2022-09-09 PROCEDURE — 2709999900 HC NON-CHARGEABLE SUPPLY: Performed by: OBSTETRICS & GYNECOLOGY

## 2022-09-09 PROCEDURE — 6360000002 HC RX W HCPCS: Performed by: OBSTETRICS & GYNECOLOGY

## 2022-09-09 PROCEDURE — 3700000000 HC ANESTHESIA ATTENDED CARE: Performed by: OBSTETRICS & GYNECOLOGY

## 2022-09-09 PROCEDURE — 6370000000 HC RX 637 (ALT 250 FOR IP): Performed by: NURSE ANESTHETIST, CERTIFIED REGISTERED

## 2022-09-09 PROCEDURE — 6360000002 HC RX W HCPCS: Performed by: ANESTHESIOLOGY

## 2022-09-09 PROCEDURE — 7100000000 HC PACU RECOVERY - FIRST 15 MIN: Performed by: OBSTETRICS & GYNECOLOGY

## 2022-09-09 PROCEDURE — 6370000000 HC RX 637 (ALT 250 FOR IP): Performed by: OBSTETRICS & GYNECOLOGY

## 2022-09-09 PROCEDURE — 2580000003 HC RX 258: Performed by: OBSTETRICS & GYNECOLOGY

## 2022-09-09 PROCEDURE — 2500000003 HC RX 250 WO HCPCS: Performed by: OBSTETRICS & GYNECOLOGY

## 2022-09-09 PROCEDURE — 3609079900 HC CESAREAN SECTION: Performed by: OBSTETRICS & GYNECOLOGY

## 2022-09-09 PROCEDURE — 1220000000 HC SEMI PRIVATE OB R&B

## 2022-09-09 PROCEDURE — 6370000000 HC RX 637 (ALT 250 FOR IP): Performed by: ANESTHESIOLOGY

## 2022-09-09 PROCEDURE — 2500000003 HC RX 250 WO HCPCS: Performed by: NURSE ANESTHETIST, CERTIFIED REGISTERED

## 2022-09-09 PROCEDURE — 6360000002 HC RX W HCPCS: Performed by: NURSE ANESTHETIST, CERTIFIED REGISTERED

## 2022-09-09 PROCEDURE — 7100000001 HC PACU RECOVERY - ADDTL 15 MIN: Performed by: OBSTETRICS & GYNECOLOGY

## 2022-09-09 PROCEDURE — 3700000001 HC ADD 15 MINUTES (ANESTHESIA): Performed by: OBSTETRICS & GYNECOLOGY

## 2022-09-09 PROCEDURE — 10907ZC DRAINAGE OF AMNIOTIC FLUID, THERAPEUTIC FROM PRODUCTS OF CONCEPTION, VIA NATURAL OR ARTIFICIAL OPENING: ICD-10-PCS | Performed by: OBSTETRICS & GYNECOLOGY

## 2022-09-09 PROCEDURE — 3E0P7VZ INTRODUCTION OF HORMONE INTO FEMALE REPRODUCTIVE, VIA NATURAL OR ARTIFICIAL OPENING: ICD-10-PCS | Performed by: OBSTETRICS & GYNECOLOGY

## 2022-09-09 PROCEDURE — 3E033VJ INTRODUCTION OF OTHER HORMONE INTO PERIPHERAL VEIN, PERCUTANEOUS APPROACH: ICD-10-PCS | Performed by: OBSTETRICS & GYNECOLOGY

## 2022-09-09 PROCEDURE — 2580000003 HC RX 258: Performed by: NURSE ANESTHETIST, CERTIFIED REGISTERED

## 2022-09-09 RX ORDER — NICOTINE 21 MG/24HR
1 PATCH, TRANSDERMAL 24 HOURS TRANSDERMAL DAILY
Status: DISCONTINUED | OUTPATIENT
Start: 2022-09-09 | End: 2022-09-11 | Stop reason: HOSPADM

## 2022-09-09 RX ORDER — KETOROLAC TROMETHAMINE 30 MG/ML
INJECTION, SOLUTION INTRAMUSCULAR; INTRAVENOUS PRN
Status: DISCONTINUED | OUTPATIENT
Start: 2022-09-09 | End: 2022-09-09 | Stop reason: SDUPTHER

## 2022-09-09 RX ORDER — DOCUSATE SODIUM 100 MG/1
100 CAPSULE, LIQUID FILLED ORAL 2 TIMES DAILY
Status: DISCONTINUED | OUTPATIENT
Start: 2022-09-09 | End: 2022-09-11 | Stop reason: HOSPADM

## 2022-09-09 RX ORDER — CEPHALEXIN 250 MG/1
500 CAPSULE ORAL EVERY 8 HOURS SCHEDULED
Status: COMPLETED | OUTPATIENT
Start: 2022-09-09 | End: 2022-09-11

## 2022-09-09 RX ORDER — LIDOCAINE HYDROCHLORIDE AND EPINEPHRINE 20; 5 MG/ML; UG/ML
INJECTION, SOLUTION EPIDURAL; INFILTRATION; INTRACAUDAL; PERINEURAL PRN
Status: DISCONTINUED | OUTPATIENT
Start: 2022-09-09 | End: 2022-09-09 | Stop reason: SDUPTHER

## 2022-09-09 RX ORDER — METRONIDAZOLE 500 MG/1
500 TABLET ORAL EVERY 8 HOURS SCHEDULED
Status: COMPLETED | OUTPATIENT
Start: 2022-09-09 | End: 2022-09-11

## 2022-09-09 RX ORDER — OXYTOCIN 10 [USP'U]/ML
INJECTION, SOLUTION INTRAMUSCULAR; INTRAVENOUS PRN
Status: DISCONTINUED | OUTPATIENT
Start: 2022-09-09 | End: 2022-09-09 | Stop reason: SDUPTHER

## 2022-09-09 RX ORDER — SIMETHICONE 80 MG
80 TABLET,CHEWABLE ORAL EVERY 6 HOURS PRN
Status: DISCONTINUED | OUTPATIENT
Start: 2022-09-09 | End: 2022-09-11 | Stop reason: HOSPADM

## 2022-09-09 RX ORDER — PRENATAL VIT/IRON FUM/FOLIC AC 27MG-0.8MG
1 TABLET ORAL DAILY
Status: DISCONTINUED | OUTPATIENT
Start: 2022-09-09 | End: 2022-09-11 | Stop reason: HOSPADM

## 2022-09-09 RX ORDER — DIPHENHYDRAMINE HYDROCHLORIDE 50 MG/ML
25 INJECTION INTRAMUSCULAR; INTRAVENOUS EVERY 6 HOURS PRN
Status: ACTIVE | OUTPATIENT
Start: 2022-09-09 | End: 2022-09-10

## 2022-09-09 RX ORDER — TRISODIUM CITRATE DIHYDRATE AND CITRIC ACID MONOHYDRATE 500; 334 MG/5ML; MG/5ML
30 SOLUTION ORAL ONCE
Status: COMPLETED | OUTPATIENT
Start: 2022-09-09 | End: 2022-09-09

## 2022-09-09 RX ORDER — SODIUM CHLORIDE, SODIUM LACTATE, POTASSIUM CHLORIDE, CALCIUM CHLORIDE 600; 310; 30; 20 MG/100ML; MG/100ML; MG/100ML; MG/100ML
INJECTION, SOLUTION INTRAVENOUS CONTINUOUS PRN
Status: DISCONTINUED | OUTPATIENT
Start: 2022-09-09 | End: 2022-09-09 | Stop reason: SDUPTHER

## 2022-09-09 RX ORDER — METOCLOPRAMIDE HYDROCHLORIDE 5 MG/ML
10 INJECTION INTRAMUSCULAR; INTRAVENOUS EVERY 6 HOURS
Status: DISCONTINUED | OUTPATIENT
Start: 2022-09-09 | End: 2022-09-09

## 2022-09-09 RX ORDER — SODIUM CHLORIDE 0.9 % (FLUSH) 0.9 %
5-40 SYRINGE (ML) INJECTION PRN
Status: DISCONTINUED | OUTPATIENT
Start: 2022-09-09 | End: 2022-09-11 | Stop reason: HOSPADM

## 2022-09-09 RX ORDER — LANOLIN 100 %
OINTMENT (GRAM) TOPICAL
Status: DISCONTINUED | OUTPATIENT
Start: 2022-09-09 | End: 2022-09-11 | Stop reason: HOSPADM

## 2022-09-09 RX ORDER — MIDAZOLAM HYDROCHLORIDE 1 MG/ML
INJECTION INTRAMUSCULAR; INTRAVENOUS PRN
Status: DISCONTINUED | OUTPATIENT
Start: 2022-09-09 | End: 2022-09-09 | Stop reason: SDUPTHER

## 2022-09-09 RX ORDER — ONDANSETRON 2 MG/ML
INJECTION INTRAMUSCULAR; INTRAVENOUS PRN
Status: DISCONTINUED | OUTPATIENT
Start: 2022-09-09 | End: 2022-09-09 | Stop reason: SDUPTHER

## 2022-09-09 RX ORDER — ENOXAPARIN SODIUM 100 MG/ML
60 INJECTION SUBCUTANEOUS DAILY
Status: DISCONTINUED | OUTPATIENT
Start: 2022-09-09 | End: 2022-09-11 | Stop reason: HOSPADM

## 2022-09-09 RX ORDER — ONDANSETRON 2 MG/ML
4 INJECTION INTRAMUSCULAR; INTRAVENOUS EVERY 6 HOURS PRN
Status: ACTIVE | OUTPATIENT
Start: 2022-09-09 | End: 2022-09-10

## 2022-09-09 RX ORDER — SODIUM CHLORIDE 9 MG/ML
INJECTION, SOLUTION INTRAVENOUS PRN
Status: DISCONTINUED | OUTPATIENT
Start: 2022-09-09 | End: 2022-09-11 | Stop reason: HOSPADM

## 2022-09-09 RX ORDER — KETOROLAC TROMETHAMINE 30 MG/ML
30 INJECTION, SOLUTION INTRAMUSCULAR; INTRAVENOUS EVERY 6 HOURS
Status: COMPLETED | OUTPATIENT
Start: 2022-09-09 | End: 2022-09-10

## 2022-09-09 RX ORDER — DIPHENHYDRAMINE HCL 25 MG
25 TABLET ORAL EVERY 6 HOURS PRN
Status: DISPENSED | OUTPATIENT
Start: 2022-09-09 | End: 2022-09-10

## 2022-09-09 RX ORDER — CLINDAMYCIN PHOSPHATE 900 MG/50ML
900 INJECTION INTRAVENOUS EVERY 8 HOURS
Status: DISCONTINUED | OUTPATIENT
Start: 2022-09-09 | End: 2022-09-09

## 2022-09-09 RX ORDER — MORPHINE SULFATE 1 MG/ML
INJECTION, SOLUTION EPIDURAL; INTRATHECAL; INTRAVENOUS PRN
Status: DISCONTINUED | OUTPATIENT
Start: 2022-09-09 | End: 2022-09-09 | Stop reason: SDUPTHER

## 2022-09-09 RX ORDER — GENTAMICIN SULFATE 40 MG/ML
INJECTION, SOLUTION INTRAMUSCULAR; INTRAVENOUS PRN
Status: DISCONTINUED | OUTPATIENT
Start: 2022-09-09 | End: 2022-09-09 | Stop reason: SDUPTHER

## 2022-09-09 RX ORDER — SODIUM CHLORIDE 0.9 % (FLUSH) 0.9 %
5-40 SYRINGE (ML) INJECTION EVERY 12 HOURS SCHEDULED
Status: DISCONTINUED | OUTPATIENT
Start: 2022-09-09 | End: 2022-09-11 | Stop reason: HOSPADM

## 2022-09-09 RX ORDER — NALOXONE HYDROCHLORIDE 0.4 MG/ML
INJECTION, SOLUTION INTRAMUSCULAR; INTRAVENOUS; SUBCUTANEOUS PRN
Status: ACTIVE | OUTPATIENT
Start: 2022-09-09 | End: 2022-09-10

## 2022-09-09 RX ADMIN — ACETAMINOPHEN 650 MG: 325 TABLET, FILM COATED ORAL at 10:37

## 2022-09-09 RX ADMIN — ACETAMINOPHEN 650 MG: 325 TABLET, FILM COATED ORAL at 18:54

## 2022-09-09 RX ADMIN — DOCUSATE SODIUM 100 MG: 100 CAPSULE, LIQUID FILLED ORAL at 23:01

## 2022-09-09 RX ADMIN — AMPICILLIN SODIUM 2000 MG: 2 INJECTION, POWDER, FOR SOLUTION INTRAMUSCULAR; INTRAVENOUS at 08:08

## 2022-09-09 RX ADMIN — PRENATAL VIT W/ FE FUMARATE-FA TAB 27-0.8 MG 1 TABLET: 27-0.8 TAB at 09:02

## 2022-09-09 RX ADMIN — SODIUM CITRATE AND CITRIC ACID MONOHYDRATE 30 ML: 500; 334 SOLUTION ORAL at 02:29

## 2022-09-09 RX ADMIN — SODIUM CHLORIDE, POTASSIUM CHLORIDE, SODIUM LACTATE AND CALCIUM CHLORIDE: 600; 310; 30; 20 INJECTION, SOLUTION INTRAVENOUS at 04:03

## 2022-09-09 RX ADMIN — Medication 10 ML: at 23:01

## 2022-09-09 RX ADMIN — MIDAZOLAM 1 MG: 1 INJECTION INTRAMUSCULAR; INTRAVENOUS at 02:40

## 2022-09-09 RX ADMIN — LIDOCAINE HYDROCHLORIDE,EPINEPHRINE BITARTRATE 5 ML: 20; .005 INJECTION, SOLUTION EPIDURAL; INFILTRATION; INTRACAUDAL; PERINEURAL at 02:41

## 2022-09-09 RX ADMIN — CLINDAMYCIN PHOSPHATE 900 MG: 900 INJECTION, SOLUTION INTRAVENOUS at 02:23

## 2022-09-09 RX ADMIN — METRONIDAZOLE 500 MG: 500 TABLET ORAL at 23:01

## 2022-09-09 RX ADMIN — MIDAZOLAM 1 MG: 1 INJECTION INTRAMUSCULAR; INTRAVENOUS at 03:07

## 2022-09-09 RX ADMIN — KETOROLAC TROMETHAMINE 30 MG: 30 INJECTION, SOLUTION INTRAMUSCULAR at 10:12

## 2022-09-09 RX ADMIN — ONDANSETRON 4 MG: 2 INJECTION INTRAMUSCULAR; INTRAVENOUS at 00:04

## 2022-09-09 RX ADMIN — CEPHALEXIN 500 MG: 250 CAPSULE ORAL at 23:00

## 2022-09-09 RX ADMIN — ONDANSETRON 4 MG: 2 INJECTION INTRAMUSCULAR; INTRAVENOUS at 02:35

## 2022-09-09 RX ADMIN — LIDOCAINE HYDROCHLORIDE,EPINEPHRINE BITARTRATE 5 ML: 20; .005 INJECTION, SOLUTION EPIDURAL; INFILTRATION; INTRACAUDAL; PERINEURAL at 02:20

## 2022-09-09 RX ADMIN — MORPHINE SULFATE 3 MG: 1 INJECTION, SOLUTION EPIDURAL; INTRATHECAL; INTRAVENOUS at 03:34

## 2022-09-09 RX ADMIN — OXYTOCIN 20 UNITS: 10 INJECTION INTRAVENOUS at 03:05

## 2022-09-09 RX ADMIN — FAMOTIDINE 20 MG: 10 INJECTION INTRAVENOUS at 02:28

## 2022-09-09 RX ADMIN — ENOXAPARIN SODIUM 60 MG: 100 INJECTION SUBCUTANEOUS at 16:20

## 2022-09-09 RX ADMIN — GENTAMICIN SULFATE 370 MG: 40 INJECTION, SOLUTION INTRAMUSCULAR; INTRAVENOUS at 03:04

## 2022-09-09 RX ADMIN — ACETAMINOPHEN 650 MG: 325 TABLET, FILM COATED ORAL at 06:29

## 2022-09-09 RX ADMIN — ACETAMINOPHEN 650 MG: 325 TABLET, FILM COATED ORAL at 14:45

## 2022-09-09 RX ADMIN — GENTAMICIN SULFATE 370 MG: 40 INJECTION, SOLUTION INTRAMUSCULAR; INTRAVENOUS at 03:02

## 2022-09-09 RX ADMIN — DIPHENHYDRAMINE HCL 25 MG: 25 TABLET ORAL at 13:52

## 2022-09-09 RX ADMIN — SODIUM CHLORIDE, SODIUM LACTATE, POTASSIUM CHLORIDE, AND CALCIUM CHLORIDE: .6; .31; .03; .02 INJECTION, SOLUTION INTRAVENOUS at 03:27

## 2022-09-09 RX ADMIN — SODIUM CHLORIDE, POTASSIUM CHLORIDE, SODIUM LACTATE AND CALCIUM CHLORIDE: 600; 310; 30; 20 INJECTION, SOLUTION INTRAVENOUS at 00:22

## 2022-09-09 RX ADMIN — KETOROLAC TROMETHAMINE 30 MG: 30 INJECTION, SOLUTION INTRAMUSCULAR at 03:30

## 2022-09-09 RX ADMIN — CLINDAMYCIN PHOSPHATE 900 MG: 900 INJECTION, SOLUTION INTRAVENOUS at 10:10

## 2022-09-09 RX ADMIN — AMPICILLIN SODIUM 2000 MG: 2 INJECTION, POWDER, FOR SOLUTION INTRAMUSCULAR; INTRAVENOUS at 04:05

## 2022-09-09 RX ADMIN — Medication 87.3 MILLI-UNITS/MIN: at 04:03

## 2022-09-09 RX ADMIN — METOCLOPRAMIDE 10 MG: 5 INJECTION, SOLUTION INTRAMUSCULAR; INTRAVENOUS at 02:29

## 2022-09-09 RX ADMIN — CEPHALEXIN 500 MG: 250 CAPSULE ORAL at 11:25

## 2022-09-09 RX ADMIN — OXYTOCIN 10 UNITS: 10 INJECTION INTRAVENOUS at 03:27

## 2022-09-09 RX ADMIN — METRONIDAZOLE 500 MG: 500 TABLET ORAL at 11:25

## 2022-09-09 RX ADMIN — DOCUSATE SODIUM 100 MG: 100 CAPSULE, LIQUID FILLED ORAL at 09:03

## 2022-09-09 RX ADMIN — KETOROLAC TROMETHAMINE 30 MG: 30 INJECTION, SOLUTION INTRAMUSCULAR at 16:21

## 2022-09-09 RX ADMIN — SODIUM CHLORIDE, SODIUM LACTATE, POTASSIUM CHLORIDE, AND CALCIUM CHLORIDE: .6; .31; .03; .02 INJECTION, SOLUTION INTRAVENOUS at 02:49

## 2022-09-09 RX ADMIN — KETOROLAC TROMETHAMINE 30 MG: 30 INJECTION, SOLUTION INTRAMUSCULAR at 23:00

## 2022-09-09 ASSESSMENT — PAIN SCALES - GENERAL
PAINLEVEL_OUTOF10: 2
PAINLEVEL_OUTOF10: 2
PAINLEVEL_OUTOF10: 3
PAINLEVEL_OUTOF10: 3
PAINLEVEL_OUTOF10: 2
PAINLEVEL_OUTOF10: 3
PAINLEVEL_OUTOF10: 3

## 2022-09-09 ASSESSMENT — PAIN DESCRIPTION - DESCRIPTORS
DESCRIPTORS: SORE
DESCRIPTORS: DISCOMFORT
DESCRIPTORS: TENDER
DESCRIPTORS: CRAMPING;SORE

## 2022-09-09 ASSESSMENT — PAIN DESCRIPTION - ORIENTATION
ORIENTATION: LOWER
ORIENTATION: LOWER
ORIENTATION: MID
ORIENTATION: LOWER

## 2022-09-09 ASSESSMENT — PAIN - FUNCTIONAL ASSESSMENT
PAIN_FUNCTIONAL_ASSESSMENT: ACTIVITIES ARE NOT PREVENTED
PAIN_FUNCTIONAL_ASSESSMENT: ACTIVITIES ARE NOT PREVENTED

## 2022-09-09 ASSESSMENT — PAIN DESCRIPTION - LOCATION
LOCATION: INCISION
LOCATION: INCISION
LOCATION: ABDOMEN;INCISION
LOCATION: ABDOMEN
LOCATION: ABDOMEN
LOCATION: INCISION

## 2022-09-09 NOTE — FLOWSHEET NOTE
RN noted a deceleration in FHR to ~50-60s at 2221. RN to bedside, flipped Pt from her left side to her right side. RN remained at bedside and noted FHR to stabilize to a baseline of ~140s. Will continue to monitor.

## 2022-09-09 NOTE — PLAN OF CARE
Problem: Pain  Goal: Verbalizes/displays adequate comfort level or baseline comfort level  2022 by Kimberlee Medellin RN  Outcome: Completed  2022 by Kimberlee Medellin RN  Outcome: Progressing     Problem: Vaginal Birth or  Section  Goal: Fetal and maternal status remain reassuring during the birth process  Description:  Birth OB-Pregnancy care plan goal which identifies if the fetal and maternal status remain reassuring during the birth process  2022 by Kimberlee Medellin RN  Outcome: Completed  2022 by Kimberlee Medellin RN  Outcome: Progressing     Problem: Safety - Adult  Goal: Free from fall injury  2022 by Kimberlee Medellin RN  Outcome: Completed  2022 by Kimberlee Medellin RN  Outcome: Progressing     Problem: Chronic Conditions and Co-morbidities  Goal: Patient's chronic conditions and co-morbidity symptoms are monitored and maintained or improved  2022 by Kimberlee Medellin RN  Outcome: Completed  2022 by Kimberlee Medellin RN  Outcome: Progressing     Problem: Skin/Tissue Integrity  Goal: Absence of new skin breakdown  Description: 1. Monitor for areas of redness and/or skin breakdown  2. Assess vascular access sites hourly  3. Every 4-6 hours minimum:  Change oxygen saturation probe site  4. Every 4-6 hours:  If on nasal continuous positive airway pressure, respiratory therapy assess nares and determine need for appliance change or resting period.   2022 by Kimberlee Medellin RN  Outcome: Completed  2022 by Kimberlee Medellin RN  Outcome: Progressing

## 2022-09-09 NOTE — ANESTHESIA POSTPROCEDURE EVALUATION
Department of Anesthesiology  Postprocedure Note    Patient: Zackary Green  MRN: [id number]  YOB: 1995  Date of evaluation: 2022      Procedure Summary     Date: 22 Room / Location: [facility]    Anesthesia Start: 1045 Anesthesia Stop:     Procedures:       Primary  SECTION with low transverse uterine incision @ 0304 by Dr. Amanda Johnson (Abdomen)      Labor Analgesia Diagnosis: (Arrest of Descent)    Surgeons: Shane Pelaez MD Responsible Provider: Sydni Barnes MD    Anesthesia Type: epidural ASA Status: 2          Anesthesia Type: No value filed. Brown Phase I: Brown Score: 9    Brown Phase II: Brown Score: 10      Anesthesia Post Evaluation    Patient location during evaluation: floor  Patient participation: complete - patient participated  Level of consciousness: awake and alert  Pain score: 3  Airway patency: patent  Nausea & Vomiting: no vomiting and no nausea  Complications: no  Cardiovascular status: hemodynamically stable  Respiratory status: spontaneous ventilation and room air  Hydration status: stable  Comments: States has itching.

## 2022-09-09 NOTE — L&D DELIVERY NOTE
OPERATIVE NOTE    Date of surgery: 2022    Prenatal Care: Complicated by: 1. H/o PCOS  2. H/o Interstitial cystitis 3. Obesity    Pre-operative Diagnosis: 1. IUP @ 40 2/7  2. IOL at term   Post-operative Diagnosis: 1. IUP @ 40 2/7  2. IOL at term  3. Labor arrest at 8 cm dilation 4. Asynclitism 5. OP position 6. Fetal intolerance of labor (tachycardia)  Procedure: Primary non planned   Anesthesia: Spinal  Surgeon: Dr Daija Alexander  Assistant: Wanda Lal  Antibiotics: Clindamycin, gentamycin   : Male, weight:3930 g, Apgars 8/9  Findings: Normal uterus, tubes, ovaries  Complications: None  Specimens: Placenta, cord gases   Urine Output: 130 ml, clear   Quantified blood loss: 680 ml     Indications: Patient is Chloe Lozada  is a 27 y.o.  female at 41w4d was admitted for IOL at term with unfavorable cervix. After 4 doses of cytotec, the AROM was done and pitocin was administered for 16 h and she dilated to 8 cm and then stalled. The fetal tracing remained reassuring throughout the labor however, for the past 2h baby have developed persistent tachycardia to 180 s. He was in OP position and 0 station on exam. Due to fetal intolerance of labor, maternal exhaustion, and labor arrest, the decision was made to proceed with CS delivery. The risks, benefits and alternatives of  section were discussed with the patient, including but not limited to infection, allergic reaction, disfiguring scar, thrombosis, injury to bowel, bladder, fistula, injury to blood vessels, hysterectomy, injury to fetus, need for blood transfusion which carries risk for HIV or hepatitis, pelvic pain, adhesive disease or scar tissue, embolism, herniation of incision site, and risk of repeat cesareans / trial of labor after . Patient elected to proceed, informed consent was signed. Procedure Details: The patient was taken to the operating room, placed in the supine position with the leftward tilt.  Epidural anesthesia was found to be adequate. Mak catheter was already in place. The patient was prepped and draped in the normal sterile fashion. Time out was performed, identifying correct patients name, date of birth, and type of the procedure. Pfannenstiel skin incision was made with the scalpel and carried down to the underlying fascia. The fascial was incised and incision was extended laterally with Howard scissors. The superior fascial incision was grasped with Kocher clamps, tented up, and the underlying rectus muscles were dissected bluntly. The inferior edge of the rectus fascia was grasped with Kocher clamps, tented up, and the underlying rectus muscle was dissected off sharply. The rectus muscle was divided in the midline bluntly. The peritoneum was entered bluntly with hemostat and extended inferiorly and superiorly with Metzenbaum scissors. The Bam retractor was then inserted. The vesicouterine peritoneum was identified, grasped with pick-ups and entered sharply with Metzenbaum scissors. The bladder flap was then created digitally. A low transverse uterine incision was made with the scalpel and extended laterally with blunt finger dissection. The baby was delivered atraumatically in cephalic presentation, OP position, with help of assistant nurse's hand elevating the head from vagina. The nose and mouth were suctioned. The cord was clamped and cut and the baby was handed off to the waiting nursing staff. Placenta was then delivered manually The uterus was not  exteriorized and it was cleared of all clots and debris. The uterine incision was closed in two layers. The first layer with running locked layer of 0 Monocryl. The second layer was an imbricating layer of 0 Monocryl with good hemostasis assured. The paracolic gutters were cleaned with moistened laps, removing blood clots and debris. Good hemostasis was again reassured throughout. The Bam retractor was then removed from the abdomen.    The peritoneum was closed with 3-0 Vicryl in a running fashion after first and second lap and instrument count were correct. The fascia was closed with 0 Vicryl in a running fashion. Good hemostasis was assured. The subcuticular layers were irrigated with warm normal saline and bleeding controlled with Bovie cautery. The subcuticular layer was reapproximated with 3-0 Vicryl in interrupted fashion. The skin was closed with a 4-0 Vicryl in a subcuticular fashion. Derma bond was applied to skin. The patient tolerated the procedure well. Sponge, lap, and needle counts were correct times three and the patient was taken to recovery in stable condition.     Electronically signed by Phyllis Crowder MD on 9/9/2022 at 3:43 AM

## 2022-09-09 NOTE — PLAN OF CARE
Problem: Pain  Goal: Verbalizes/displays adequate comfort level or baseline comfort level  Outcome: Progressing     Problem: Vaginal Birth or  Section  Goal: Fetal and maternal status remain reassuring during the birth process  Description:  Birth OB-Pregnancy care plan goal which identifies if the fetal and maternal status remain reassuring during the birth process  Outcome: Progressing     Problem: Safety - Adult  Goal: Free from fall injury  Outcome: Progressing     Problem: Chronic Conditions and Co-morbidities  Goal: Patient's chronic conditions and co-morbidity symptoms are monitored and maintained or improved  Outcome: Progressing     Problem: Skin/Tissue Integrity  Goal: Absence of new skin breakdown  Description: 1. Monitor for areas of redness and/or skin breakdown  2. Assess vascular access sites hourly  3. Every 4-6 hours minimum:  Change oxygen saturation probe site  4. Every 4-6 hours:  If on nasal continuous positive airway pressure, respiratory therapy assess nares and determine need for appliance change or resting period.   Outcome: Progressing

## 2022-09-09 NOTE — FLOWSHEET NOTE
Call placed to Dr. Sachin De La Fuente to make aware of Pt cervix being 9/100/0 and fetal tachycardia with FHR ~160-180. Pt is currently on her left side and high fowlers, laboring down. Per MD RN is to continue to monitor FHR and re-check Pt at 0200. Will continue to monitor.

## 2022-09-09 NOTE — LACTATION NOTE
This note was copied from a baby's chart. LC called to room for feeding attempt. Reviewed positioning and latch on techniques with mother. Mother independently able to position and latch infant using good technique.

## 2022-09-09 NOTE — FLOWSHEET NOTE
Dr. Jenny Sierra at bedside and performed SVE. MD discussed POC with Pt. Decision for primary  due to failure to progress and fetal intolerance of labor made at this time.

## 2022-09-09 NOTE — FLOWSHEET NOTE
Per Dr. Sammi Sauceda RN is to start increasing pitocin by 2 mu/min to an adequate contraction pattern. Will continue to monitor.

## 2022-09-09 NOTE — FLOWSHEET NOTE
40+3 week male infant delivered via  Section at this time by Dr. Daija Alexander. Infant dried and stimulated on mother's abdomen by MD and responded with spontaneous cry. Infant shown to parents and taken to warmer for assessment. Infant care assumed by [facility] RN at this time. Apgars 8/9.

## 2022-09-09 NOTE — ANESTHESIA POSTPROCEDURE EVALUATION
Department of Anesthesiology  Postprocedure Note    Patient: Jeffery Jarrell  MRN: [id number]  YOB: 1995  Date of evaluation: 2022      Procedure Summary     Date: 22 Room / Location: [facility]    Anesthesia Start: 1045 Anesthesia Stop:     Procedures:        SECTION with low transverse uterine incision @ 0304 (Abdomen)      Labor Analgesia Diagnosis: (Arrest of Descent)    Surgeons: Juan Daniel Shaw MD Responsible Provider: Link Davis MD    Anesthesia Type: epidural ASA Status: 2          Anesthesia Type: No value filed.     Brown Phase I:      Brown Phase II:        Anesthesia Post Evaluation    Patient location during evaluation: PACU  Patient participation: complete - patient participated  Level of consciousness: awake and alert  Pain score: 0  Nausea & Vomiting: no nausea and no vomiting  Complications: no  Cardiovascular status: hemodynamically stable  Respiratory status: acceptable  Hydration status: stable

## 2022-09-09 NOTE — PLAN OF CARE
Problem: Safety - Adult  Goal: Free from fall injury  2022 by Melany Palafox RN  Outcome: Progressing  2022 050 by Johnathon Faye RN  Outcome: Progressing     Problem: Discharge Planning  Goal: Discharge to home or other facility with appropriate resources  2022 by Melany Palafox RN  Outcome: Progressing  2022 0500 by Johnathon Faye RN  Outcome: Progressing     Problem: Cognitive:  Goal: Knowledge of wound care  Description: Knowledge of wound care  2022 by Melany Palafox RN  Outcome: Progressing  2022 0500 by Johnathon Faye RN  Outcome: Progressing     Problem: Postpartum  Goal: Experiences normal postpartum course  Description:  Postpartum OB-Pregnancy care plan goal which identifies if the mother is experiencing a normal postpartum course  2022 by Melany Palafox RN  Outcome: Progressing  2022 0500 by Johnathon Faye RN  Outcome: Progressing  Goal: Appropriate maternal -  bonding  Description:  Postpartum OB-Pregnancy care plan goal which identifies if the mother and  are bonding appropriately  2022 by Melany Palafox RN  Outcome: Progressing  2022 0500 by Johnathon Faye RN  Outcome: Progressing  Goal: Establishment of infant feeding pattern  Description:  Postpartum OB-Pregnancy care plan goal which identifies if the mother is establishing a feeding pattern with their   2022 by Melany Palafox RN  Outcome: Progressing  2022 0500 by Johnathon Faye RN  Outcome: Progressing  Goal: Incisions, wounds, or drain sites healing without S/S of infection  2022 by Melany Palafox RN  Outcome: Progressing  2022 0500 by Johnathon Faye RN  Outcome: Progressing     Problem: Pain  Goal: Verbalizes/displays adequate comfort level or baseline comfort level  2022 by Melany Palafox RN  Outcome: Progressing  2022 050 by Johnathon Faye RN  Outcome: Progressing     Problem: Infection - Adult  Goal: Absence of infection at discharge  9/9/2022 1704 by Michelle Galindo RN  Outcome: Progressing  9/9/2022 0500 by Bishop Evelina RN  Outcome: Progressing  Goal: Absence of infection during hospitalization  9/9/2022 1704 by Michelle Galindo RN  Outcome: Progressing  9/9/2022 0500 by Bishop Evelina RN  Outcome: Progressing  Goal: Absence of fever/infection during anticipated neutropenic period  9/9/2022 1704 by Michelle Galindo RN  Outcome: Progressing  9/9/2022 0500 by Bishop Evelina RN  Outcome: Progressing

## 2022-09-09 NOTE — LACTATION NOTE
This note was copied from a baby's chart. Lactation Progress Note  Initial Consult    Data: Referral received per RN. Action: LC to room. Mother states agreeable to consult from [facility] at this time. I reviewed Care Plan for First 24 Hours of Life already in patient binder. Discussed recognizing hunger cues and offering the breast when cues are shown. Encouraged breastfeeding on demand and attempting/offering at least every 3 hours. Informed infant may have one 5 hour stretch of sleep in a 24 hour period. Encouraged unlimited skin to skin contact with infant and reviewed benefits including better temperature, heart rate, respiration, blood pressure, and blood sugar regulation. Also increased bonding and milk supply associated with skin to skin contact. Discussed feeding positions, latch on techniques, signs of milk transfer, output goals and normal feeding/sleeping behaviors. I referred mother to binder for additional information about breastfeeding and skin to skin contact. Discussed hand expression with mother and encouraged her to practice getting drops to infant today. Faxed script for breastpump to [employer] at mother's request. Mother states she already received her pump but got an email from [employer] saying they needed a script. Gave breastfeeding booklet along with additional resources for after discharge. I wrote my name and circled the phone number on patient's whiteboard, provided a lactation consultant business card, directed mother to [facility] for evidence based information, and encouraged mother to call for a feeding. Response: Mother verbalizes understanding of information given and denies further needs at this time. Mother states will call for next feeding.

## 2022-09-09 NOTE — PROGRESS NOTES
G1 @ 40 3, admitted for IOL at term with unfavorable cervix, s/p cytotec x4, then pitocin for 16 h, who dilated to 8 cm and then stalled. Baby developed tachycardia and significant caput. Suspected to be OP. Since has been remote from vaginal delivery, the decision was made to proceed with non scheduled . All was discussed with Kristen and her family and she agreed to proceed. We discussed RBC of surgery, including infection, bleeding, injury to internal organs and fetus and more, informed consent was signed.

## 2022-09-09 NOTE — FLOWSHEET NOTE
Call placed to Dr. Daphnie Gleason to make aware of PT cervix AC/100/+1 station with FHR basline of ~185. MD is en route to bedside to check PT and discuss POC. RN remains at bedside. Will continue to monitor.

## 2022-09-09 NOTE — FLOWSHEET NOTE
Recovery end at 0547. No complications noted. Bleeding WNL, fundus firm and midline, and all VSS. Epidural removed and tip noted to be intact. Pt educated on proper perineal comfort and care. Elizabeth care performed and gown and elizabeth pads changed. Pt transferred with all belongings, FOB, and infant to postpartum room [phone]. Pt oriented to room, call light, and plan of care. All questions answered at this time, Pt denies any additional needs.

## 2022-09-09 NOTE — FLOWSHEET NOTE
Patient sitting upin hair. Gown changed. Bed complete linen change. IV to normal saline loc. No complaints at this time.

## 2022-09-10 LAB
HCT VFR BLD CALC: 30.7 % (ref 36–48)
HEMOGLOBIN: 10.7 G/DL (ref 12–16)
MCH RBC QN AUTO: 31.3 PG (ref 26–34)
MCHC RBC AUTO-ENTMCNC: 34.7 G/DL (ref 31–36)
MCV RBC AUTO: 90.1 FL (ref 80–100)
PDW BLD-RTO: 13.6 % (ref 12.4–15.4)
PLATELET # BLD: 242 K/UL (ref 135–450)
PMV BLD AUTO: 7.1 FL (ref 5–10.5)
RBC # BLD: 3.4 M/UL (ref 4–5.2)
WBC # BLD: 16.3 K/UL (ref 4–11)

## 2022-09-10 PROCEDURE — 6370000000 HC RX 637 (ALT 250 FOR IP): Performed by: OBSTETRICS & GYNECOLOGY

## 2022-09-10 PROCEDURE — 6360000002 HC RX W HCPCS: Performed by: ANESTHESIOLOGY

## 2022-09-10 PROCEDURE — 6360000002 HC RX W HCPCS: Performed by: OBSTETRICS & GYNECOLOGY

## 2022-09-10 PROCEDURE — 85027 COMPLETE CBC AUTOMATED: CPT

## 2022-09-10 PROCEDURE — 36415 COLL VENOUS BLD VENIPUNCTURE: CPT

## 2022-09-10 PROCEDURE — 1220000000 HC SEMI PRIVATE OB R&B

## 2022-09-10 RX ORDER — OXYCODONE HYDROCHLORIDE 5 MG/1
5 TABLET ORAL EVERY 4 HOURS PRN
Status: DISCONTINUED | OUTPATIENT
Start: 2022-09-10 | End: 2022-09-11 | Stop reason: HOSPADM

## 2022-09-10 RX ORDER — IBUPROFEN 800 MG/1
800 TABLET ORAL EVERY 8 HOURS PRN
Status: DISCONTINUED | OUTPATIENT
Start: 2022-09-10 | End: 2022-09-11 | Stop reason: HOSPADM

## 2022-09-10 RX ADMIN — METRONIDAZOLE 500 MG: 500 TABLET ORAL at 06:07

## 2022-09-10 RX ADMIN — OXYCODONE 5 MG: 5 TABLET ORAL at 14:22

## 2022-09-10 RX ADMIN — IBUPROFEN 800 MG: 800 TABLET, FILM COATED ORAL at 19:43

## 2022-09-10 RX ADMIN — CEPHALEXIN 500 MG: 250 CAPSULE ORAL at 22:43

## 2022-09-10 RX ADMIN — DOCUSATE SODIUM 100 MG: 100 CAPSULE, LIQUID FILLED ORAL at 08:51

## 2022-09-10 RX ADMIN — METRONIDAZOLE 500 MG: 500 TABLET ORAL at 22:43

## 2022-09-10 RX ADMIN — ACETAMINOPHEN 650 MG: 325 TABLET, FILM COATED ORAL at 15:54

## 2022-09-10 RX ADMIN — ACETAMINOPHEN 650 MG: 325 TABLET, FILM COATED ORAL at 08:52

## 2022-09-10 RX ADMIN — KETOROLAC TROMETHAMINE 30 MG: 30 INJECTION, SOLUTION INTRAMUSCULAR at 04:19

## 2022-09-10 RX ADMIN — IBUPROFEN 800 MG: 800 TABLET, FILM COATED ORAL at 12:56

## 2022-09-10 RX ADMIN — METRONIDAZOLE 500 MG: 500 TABLET ORAL at 14:22

## 2022-09-10 RX ADMIN — OXYCODONE 5 MG: 5 TABLET ORAL at 04:48

## 2022-09-10 RX ADMIN — CEPHALEXIN 500 MG: 250 CAPSULE ORAL at 14:22

## 2022-09-10 RX ADMIN — ENOXAPARIN SODIUM 60 MG: 100 INJECTION SUBCUTANEOUS at 08:51

## 2022-09-10 RX ADMIN — PRENATAL VIT W/ FE FUMARATE-FA TAB 27-0.8 MG 1 TABLET: 27-0.8 TAB at 08:51

## 2022-09-10 RX ADMIN — CEPHALEXIN 500 MG: 250 CAPSULE ORAL at 06:07

## 2022-09-10 RX ADMIN — ACETAMINOPHEN 650 MG: 325 TABLET, FILM COATED ORAL at 04:48

## 2022-09-10 RX ADMIN — OXYCODONE 5 MG: 5 TABLET ORAL at 19:42

## 2022-09-10 RX ADMIN — DOCUSATE SODIUM 100 MG: 100 CAPSULE, LIQUID FILLED ORAL at 19:41

## 2022-09-10 ASSESSMENT — PAIN SCALES - GENERAL
PAINLEVEL_OUTOF10: 1
PAINLEVEL_OUTOF10: 9
PAINLEVEL_OUTOF10: 1
PAINLEVEL_OUTOF10: 5
PAINLEVEL_OUTOF10: 4
PAINLEVEL_OUTOF10: 4
PAINLEVEL_OUTOF10: 1

## 2022-09-10 NOTE — PLAN OF CARE
Problem: Safety - Adult  Goal: Free from fall injury  9/10/2022 1202 by Julia Greene RN  Outcome: Progressing     Problem: Discharge Planning  Goal: Discharge to home or other facility with appropriate resources  9/10/2022 1202 by Julia Greene RN  Outcome: Progressing     Problem: Cognitive:  Goal: Knowledge of wound care  Description: Knowledge of wound care  9/10/2022 1202 by Julia Greene RN  Outcome: Progressing     Problem: Postpartum  Goal: Experiences normal postpartum course  Description:  Postpartum OB-Pregnancy care plan goal which identifies if the mother is experiencing a normal postpartum course  9/10/2022 1202 by Julia Greene RN  Outcome: Progressing     Problem: Postpartum  Goal: Appropriate maternal -  bonding  Description:  Postpartum OB-Pregnancy care plan goal which identifies if the mother and  are bonding appropriately  9/10/2022 1202 by Julia Greene RN  Outcome: Progressing     Problem: Postpartum  Goal: Establishment of infant feeding pattern  Description:  Postpartum OB-Pregnancy care plan goal which identifies if the mother is establishing a feeding pattern with their   9/10/2022 120 by Julia Greene RN  Outcome: Progressing     Problem: Postpartum  Goal: Incisions, wounds, or drain sites healing without S/S of infection  9/10/2022 1202 by Julia Greene RN  Outcome: Progressing     Problem: Pain  Goal: Verbalizes/displays adequate comfort level or baseline comfort level  9/10/2022 1202 by Julia Greene RN  Outcome: Progressing     Problem: Infection - Adult  Goal: Absence of infection at discharge  9/10/2022 1202 by Julia Greene RN  Outcome: Progressing     Problem: Infection - Adult  Goal: Absence of infection during hospitalization  9/10/2022 1202 by Julia Greene RN  Outcome: Progressing     Problem: Infection - Adult  Goal: Absence of fever/infection during anticipated neutropenic period  9/10/2022 1202 by Jose Luis Cedeño RN  Outcome: Progressing

## 2022-09-10 NOTE — PLAN OF CARE
Problem: Safety - Adult  Goal: Free from fall injury  9/10/2022 045 by Tylor Bro RN  Outcome: Progressing  2022 1704 by Bunny Douglas RN  Outcome: Progressing     Problem: Discharge Planning  Goal: Discharge to home or other facility with appropriate resources  9/10/2022 0456 by Tylor Bro RN  Outcome: Progressing  2022 by Bunny Douglas RN  Outcome: Progressing     Problem: Cognitive:  Goal: Knowledge of wound care  Description: Knowledge of wound care  9/10/2022 0456 by Tylor Bro RN  Outcome: Progressing  2022 170 by Bunny Douglas RN  Outcome: Progressing     Problem: Postpartum  Goal: Experiences normal postpartum course  Description:  Postpartum OB-Pregnancy care plan goal which identifies if the mother is experiencing a normal postpartum course  9/10/2022 0456 by Tylor Bro RN  Outcome: Progressing  2022 by Bunny Douglas RN  Outcome: Progressing  Goal: Appropriate maternal -  bonding  Description:  Postpartum OB-Pregnancy care plan goal which identifies if the mother and  are bonding appropriately  9/10/2022 0456 by Tylor Bro RN  Outcome: Progressing  2022 170 by Bunny Douglas RN  Outcome: Progressing  Goal: Establishment of infant feeding pattern  Description:  Postpartum OB-Pregnancy care plan goal which identifies if the mother is establishing a feeding pattern with their   9/10/2022 0456 by Tylor Bro RN  Outcome: Progressing  2022 by Bunny Douglas RN  Outcome: Progressing  Goal: Incisions, wounds, or drain sites healing without S/S of infection  9/10/2022 0456 by Tylor Bro RN  Outcome: Progressing  2022 1704 by Bunny Douglas RN  Outcome: Progressing     Problem: Pain  Goal: Verbalizes/displays adequate comfort level or baseline comfort level  9/10/2022 0456 by Tylor Bro RN  Outcome: Progressing  2022 170 by Bunny Douglas RN  Outcome: Progressing     Problem: Infection - Adult  Goal: Absence of infection at discharge  9/10/2022 0456 by Regina Kemp RN  Outcome: Progressing  9/9/2022 1704 by Elodia Phillips RN  Outcome: Progressing  Goal: Absence of infection during hospitalization  9/10/2022 0456 by Regina Kemp RN  Outcome: Progressing  9/9/2022 1704 by Elodia Phillips RN  Outcome: Progressing  Goal: Absence of fever/infection during anticipated neutropenic period  9/10/2022 0456 by Regina Kemp RN  Outcome: Progressing  9/9/2022 1704 by Elodia Phillips RN  Outcome: Progressing

## 2022-09-10 NOTE — PROGRESS NOTES
Department of Obstetrics and Gynecology   Postpartum Rounds    SUBJECTIVE:  Pain is controlled with Tylenol, non-steroidal anti-inflammatory drugs, or narcotic analgesics. The patient is tolerating regular diet. She is ambulating. Her lochia is normal.    OBJECTIVE:  Vital Signs: BP (!) 90/54   Pulse 97   Temp 97.8 °F (36.6 °C) (Oral)   Resp 16   Ht 5' 4\" (1.626 m)   Wt 223 lb (101.2 kg)   LMP 2021   SpO2 97%   Breastfeeding Unknown   BMI 38.28 kg/m²   Appearance/Psychiatric: awake, alert, cooperative, no apparent distress, appears stated age  Constitutional: The patient is well nourished. Cardiovascular: She does have mild edema. Respiratory: Respiratory effort is normal.  Gastrointestinal: Soft, appropriately tender, uterine fundus is firm below umbilicus  The incision is clean, dry, and intact  Extremities: nontender to palpation    LABS / IMAGING:  CBC:   Lab Results   Component Value Date/Time    WBC 10.0 2022 05:30 PM    RBC 4.25 2022 05:30 PM    HGB 13.1 2022 05:30 PM    HCT 38.5 2022 05:30 PM    MCV 90.8 2022 05:30 PM    MCH 30.9 2022 05:30 PM    MCHC 34.1 2022 05:30 PM    RDW 13.4 2022 05:30 PM     2022 05:30 PM    MPV 7.1 2022 05:30 PM       ASSESSMENT:    Postoperative Day 1 s/p  Unscheduled Primary CS for arrest      PLAN:   1. Advance postoperative care as tolerated  2. Male infant, breast feeding. Desires circumcision, to be done later today. 3. Discharge home on Postpartum Day 2-3  4. Return to office in 6 weeks   5.  Postpartum instructions reviewed and all patient's Questions answered    Electronically signed by Cecil Diamond MD on 9/10/2022 at 7:14 AM

## 2022-09-11 VITALS
HEART RATE: 78 BPM | BODY MASS INDEX: 38.07 KG/M2 | TEMPERATURE: 97.6 F | OXYGEN SATURATION: 98 % | WEIGHT: 223 LBS | DIASTOLIC BLOOD PRESSURE: 56 MMHG | RESPIRATION RATE: 16 BRPM | HEIGHT: 64 IN | SYSTOLIC BLOOD PRESSURE: 100 MMHG

## 2022-09-11 PROCEDURE — 6360000002 HC RX W HCPCS: Performed by: OBSTETRICS & GYNECOLOGY

## 2022-09-11 PROCEDURE — 6370000000 HC RX 637 (ALT 250 FOR IP): Performed by: OBSTETRICS & GYNECOLOGY

## 2022-09-11 RX ORDER — PSEUDOEPHEDRINE HCL 30 MG
100 TABLET ORAL 2 TIMES DAILY
Qty: 60 CAPSULE | Refills: 1 | Status: SHIPPED | OUTPATIENT
Start: 2022-09-11

## 2022-09-11 RX ORDER — HYDROCODONE BITARTRATE AND ACETAMINOPHEN 5; 325 MG/1; MG/1
1 TABLET ORAL EVERY 6 HOURS PRN
Qty: 15 TABLET | Refills: 0 | Status: SHIPPED | OUTPATIENT
Start: 2022-09-11 | End: 2022-09-15

## 2022-09-11 RX ORDER — IBUPROFEN 800 MG/1
800 TABLET ORAL EVERY 8 HOURS PRN
Qty: 120 TABLET | Refills: 3 | Status: SHIPPED | OUTPATIENT
Start: 2022-09-11

## 2022-09-11 RX ADMIN — OXYCODONE 5 MG: 5 TABLET ORAL at 00:08

## 2022-09-11 RX ADMIN — PRENATAL VIT W/ FE FUMARATE-FA TAB 27-0.8 MG 1 TABLET: 27-0.8 TAB at 08:40

## 2022-09-11 RX ADMIN — ENOXAPARIN SODIUM 60 MG: 100 INJECTION SUBCUTANEOUS at 09:58

## 2022-09-11 RX ADMIN — DOCUSATE SODIUM 100 MG: 100 CAPSULE, LIQUID FILLED ORAL at 08:40

## 2022-09-11 RX ADMIN — CEPHALEXIN 500 MG: 250 CAPSULE ORAL at 06:14

## 2022-09-11 RX ADMIN — OXYCODONE 5 MG: 5 TABLET ORAL at 04:05

## 2022-09-11 RX ADMIN — OXYCODONE 5 MG: 5 TABLET ORAL at 08:40

## 2022-09-11 RX ADMIN — ACETAMINOPHEN 650 MG: 325 TABLET, FILM COATED ORAL at 08:40

## 2022-09-11 RX ADMIN — METRONIDAZOLE 500 MG: 500 TABLET ORAL at 06:14

## 2022-09-11 RX ADMIN — SIMETHICONE 80 MG: 80 TABLET, CHEWABLE ORAL at 06:13

## 2022-09-11 RX ADMIN — IBUPROFEN 800 MG: 800 TABLET, FILM COATED ORAL at 04:05

## 2022-09-11 RX ADMIN — ACETAMINOPHEN 650 MG: 325 TABLET, FILM COATED ORAL at 00:07

## 2022-09-11 ASSESSMENT — PAIN SCALES - GENERAL
PAINLEVEL_OUTOF10: 7
PAINLEVEL_OUTOF10: 3
PAINLEVEL_OUTOF10: 7

## 2022-09-11 NOTE — DISCHARGE INSTRUCTIONS
Department of Obstetrics and Gynecology   Postpartum Discharge instructions    You will need a postpartum visit in the office 6 weeks after your delivery. Please call the office to schedule an appointment: [phone]      Please call the office or the OB/GYN on-call if after-hours, for any of the followin) Fever - a temperature greater than 100.4  2) Uncontrolled pain  3) Uncontrolled bleeding (soaking more than 1 pad in an hour)  4) Foul-smelling discharge from the vagina  5) Red, painful incision or foul-smelling discharge from incision. Do not place anything in the vagina - this includes tampons, douches or having sex - until after your 6 week postpartum visit to prevent infection. Wash your incision everyday with warm water and gentle soap. You may pat it dry. If your staples were removed in the hospital, you may have steri-strips (small pieces of tape) on the incision. These should fall off on the first week. If they do not fall off, please remove them 1 week after surgery. If you have staples that were note removed in the hospital, they should be removed 4 to 7 days after delivery. You should call the office for an appointment to have them removed. If your incision was closed with suture, you do not need to have the suture removed, it will dissolve on its own. Thank you for the opportunity to care for you and your family. We hope we always exceeded your expectations and provided very good care during your stay in the [facility]. We want to ensure that you have the help you need when you leave the hospital. If there is anything we can assist you with please let us know. Please call and schedule an appointment to be seen by your obstetrician as scheduled. You will need to call and make your own appointment. For breastfeeding moms, you can contact our lactation consultants with any problems or questions. Please call [phone] for questions.     Diet  Eat a well balanced diet focusing on foods high in fiber and protein. Drink plenty of fluids, especially water. To avoid constipation you may take a mild stool softener as recommended by your doctor or midwife. Activity  Gradually increase your activity. Resume exercise only after advise by your doctor or midwife. Avoid lifting anything heavier than your baby for 2 weeks. Avoid driving for 5-7 days or when not taking narcotics. Rise slowly from a lying to sitting and then a standing position. Climb stairs one at a time. Use caution when carrying your baby up and down the stairs. NO SEXUAL activity for 6 weeks or until advised by your doctor. Nothing in the vagina. No tampons, no douches and no intercourse. Be prepared to discuss family planning at your follow-up OB visit. You may feel tired or have a lack of energy. You may continue your prenatal vitamin to replenish nutrients post delivery. Nap when your baby naps to catch up on sleep. EMOTIONS  You may feel dodd, sad, teary and/or overwhelmed. Contact your OB provider if you feel you may be showing signs of postpartum depression or have thoughts of harming yourself or your baby. If infant will not stop crying, contact another adult for help. Place the infant in his/her crib and step away for a break. NEVER shake your baby. BLEEDING  Vaginal bleeding will decrease in amount over the next few weeks. You will notice that as your activity increases, you flow may increase. This is your body's way of telling you to \"take it easy\" and rest.  Call your OB if you are saturating more than one maxi pad in an hour for 2 hours and resting does not help. Also call if your bleeding has a foul odor or you are passing clots larger than a lemon on several occasions. BREAST CARE  Take pain medications as needed and as prescribed by your [facility] provider for pain.   If you develop a warm, red, tender area on your breast or develop a fever contact your OB provider. For breastfeeding mothers: If you become engorged, feeding may be more difficult or painful for 1-2 days. You may find it helpful to hand express some milk. Hand expressing may make it easier for your baby to latch. While breastfeeding, continue to take your prenatal vitamins as directed by your doctor or midwife. Refer to the breastfeeding booklet in the  folder/binder for more information. For any questions or concerns please contact a Lactation Consultant. Leave a message and your call will be returned. INCISIONAL CARE/ EPISIOTOMY CARE  Incisional care:  Clean your incision in the shower with mild soap and water. After showering pat the incision area dry and allow the area to remain open to air. If used, steri-strips should be removed by 2 weeks. If used, staples should be removed by the home health nurse or OB office by 1 week. An abdominal binder may provide support for your incision. Episiotomy (elizabeth-care): Use the elizabeth-bottle after toileting, until your bleeding stops. Cleanse your perineum from front to back. If used, stitches will dissolve in 4-6 weeks. You may use a sitz bath or soak in a clean tub with antibacterial soap as needed for comfort. Kegel exercises will help restore bladder control. SWELLING  Try to keep your legs elevated when you are sitting. When lying down, keep your legs elevated. When wearing stockings or socks, make sure they are not too tight. WHEN TO CALL THE DOCTOR  If you have a temperature of 100.6 or more. If your bleeding has increased and you are saturating a pad or more in 1 hour for 2 hours. Your abdomen is tender to the touch. You are passing blood clots bigger than the size of a lemon for several occasions. If you are experiencing extreme weakness or dizziness. If you are having flu-like symptoms: achy muscles or joints. If there is a foul-smell or green color to your vaginal bleeding.   If you have pain that cannot be relieved. You have persistent burning with urination or frequency. Call if you have concerns about your well-being. You are unable to sleep, eat or are having thoughts of harming yourself or your baby. You have swelling, bleeding, drainage, foul odor, redness or warmth in/around your incision or stitches. You have a red, warm or tender area in your calf. Headache that rest and Tylenol does not relieve.

## 2022-09-11 NOTE — DISCHARGE SUMMARY
Department of Obstetrics and Gynecology  Postpartum Discharge Summary      Admit Date: 2022    Admit Diagnosis: 40 weeks gestation of pregnancy [Z3A.40]    Discharge Date:   Any delay in discharge from ordered D/C date due to  factors. Discharge Diagnoses: primary C section       Medication List        START taking these medications      docusate 100 MG Caps  Commonly known as: COLACE, DULCOLAX  Take 100 mg by mouth 2 times daily     HYDROcodone-acetaminophen 5-325 MG per tablet  Commonly known as: Norco  Take 1 tablet by mouth every 6 hours as needed for Pain for up to 4 days. Intended supply: 3 days.  Take lowest dose possible to manage pain     ibuprofen 800 MG tablet  Commonly known as: ADVIL;MOTRIN  Take 1 tablet by mouth every 8 hours as needed for Pain            CONTINUE taking these medications      Prenatal Vitamin and Mineral 28-0.8 MG Tabs  Take 1 tablet by mouth daily            STOP taking these medications      albuterol sulfate  (90 Base) MCG/ACT inhaler  Commonly known as: PROVENTIL;VENTOLIN;PROAIR     aluminum & magnesium hydroxide-simethicone 200-200-20 MG/5ML Susp suspension  Commonly known as: Mylanta     dicyclomine 20 MG tablet  Commonly known as: BENTYL     doxylamine-pyridoxine 10-10 MG Tbec     famotidine 20 MG tablet  Commonly known as: PEPCID     norethindrone 0.35 MG tablet  Commonly known as: MICRONOR     ondansetron 4 MG disintegrating tablet  Commonly known as: Zofran ODT     pantoprazole 40 MG tablet  Commonly known as: PROTONIX     pyridoxine 50 MG tablet  Commonly known as: RA Vitamin B-6     sucralfate 1 GM/10ML suspension  Commonly known as: CARAFATE     vitamin D 1.25 MG (79488 UT) Caps capsule  Commonly known as: ERGOCALCIFEROL               Where to Get Your Medications        These [facility] [place] sent [facility] [address] [place] [facility] [facility] [address] [phone] Donnice Frankel [phone]  [address], [address] [place] [id number] Phone: [phone]   docusate 100 MG Caps  HYDROcodone-acetaminophen 5-325 MG per tablet  ibuprofen 800 MG tablet         Service: Obstetrics    Consults: None    Significant Diagnostic Studies: none    Postpartum complications: none     Condition at Discharge: [facility]: uncomplicated    Discharge Instructions: Activity: as tolerated    Diet: regular diet    Instructions: No intercourse and nothing in the vagina for 6 weeks. Do not drive while using pain medications.  Keep any wounds clean and dry    Discharge to: Home    Disposition / Follow up: Return to office in 6 weeks    Home Health Nurse visit within 24-48 h if qualifies     Data:  Apgars:  Information for the patient's :  Ebenezer Osorio [[id number]]   APGAR One: 8   Information for the patient's :  Elzbieta Peterson [[id number]]   APGAR Five: 9   Birth Weight:  Information for the patient's :  Ebenezer Osorio [[id number]]   Birth Weight: 8 lb 10.6 oz (3.93 kg)   Home with mother    Electronically signed by Melony Chen MD on 2022 at 7:41 AM

## 2022-09-11 NOTE — PROGRESS NOTES
Department of Obstetrics and Gynecology   Postpartum Rounds    SUBJECTIVE:  Pain is controlled with Tylenol, non-steroidal anti-inflammatory drugs, or narcotic analgesics. The patient is tolerating regular diet. She is ambulating. Her lochia is normal.    OBJECTIVE:  Vital Signs: /65   Pulse 76   Temp 98.1 °F (36.7 °C) (Oral)   Resp 16   Ht 5' 4\" (1.626 m)   Wt 223 lb (101.2 kg)   LMP 2021   SpO2 98%   Breastfeeding Unknown   BMI 38.28 kg/m²   Appearance/Psychiatric: awake, alert, cooperative, no apparent distress, appears stated age  Constitutional: The patient is well nourished. Cardiovascular: She does have mild edema. Respiratory: Respiratory effort is normal.  Gastrointestinal: Soft, appropriately tender, uterine fundus is firm below umbilicus  The incision is clean, dry, and intact  Extremities: nontender to palpation    LABS / IMAGING:  CBC:   Lab Results   Component Value Date/Time    WBC 16.3 09/10/2022 09:30 AM    RBC 3.40 09/10/2022 09:30 AM    HGB 10.7 09/10/2022 09:30 AM    HCT 30.7 09/10/2022 09:30 AM    MCV 90.1 09/10/2022 09:30 AM    MCH 31.3 09/10/2022 09:30 AM    MCHC 34.7 09/10/2022 09:30 AM    RDW 13.6 09/10/2022 09:30 AM     09/10/2022 09:30 AM    MPV 7.1 09/10/2022 09:30 AM       ASSESSMENT:    Postoperative Day 2 s/p  Unscheduled Primary CS for arrest      PLAN:   1. Advance postoperative care as tolerated  2. Male infant, breast feeding. Desires circumcision, to be done this morning. 3. Discharge home on Postpartum Day 2  4. Return to office in 6 weeks   5.  Postpartum instructions reviewed and all patient's Questions answered    Electronically signed by Holly Gandhi MD on 2022 at 7:14 AM

## 2022-09-11 NOTE — PLAN OF CARE
Problem: Safety - Adult  Goal: Free from fall injury  Outcome: Progressing     Problem: Discharge Planning  Goal: Discharge to home or other facility with appropriate resources  Outcome: Progressing     Problem: Cognitive:  Goal: Knowledge of wound care  Description: Knowledge of wound care  Outcome: Progressing     Problem: Postpartum  Goal: Experiences normal postpartum course  Description:  Postpartum OB-Pregnancy care plan goal which identifies if the mother is experiencing a normal postpartum course  Outcome: Progressing  Goal: Appropriate maternal -  bonding  Description:  Postpartum OB-Pregnancy care plan goal which identifies if the mother and  are bonding appropriately  Outcome: Progressing  Goal: Establishment of infant feeding pattern  Description:  Postpartum OB-Pregnancy care plan goal which identifies if the mother is establishing a feeding pattern with their   Outcome: Progressing  Goal: Incisions, wounds, or drain sites healing without S/S of infection  Outcome: Progressing     Problem: Pain  Goal: Verbalizes/displays adequate comfort level or baseline comfort level  Outcome: Progressing     Problem: Infection - Adult  Goal: Absence of infection at discharge  Outcome: Progressing  Goal: Absence of infection during hospitalization  Outcome: Progressing  Goal: Absence of fever/infection during anticipated neutropenic period  Outcome: Progressing

## 2022-09-11 NOTE — FLOWSHEET NOTE
Postpartum and infant care teaching completed and forms signed by patient. Copy witnessed by RN and given to patient. Patient verbalized understanding of all teaching points. Prescriptions given if applicable. Patient plans to follow-up with [place] Provider as instructed. Patient verbalizes understanding of discharge instructions and denies further questions. ID bands checked. Mother's ID band and one of baby's ID bands removed and taped to footprint sheet, signed by patient and witnessed by RN. Patient discharged in stable condition accompanied by family/guardian. Discharged in wheelchair, holding baby in arms.

## 2022-12-31 NOTE — FLOWSHEET NOTE
Patient : Spike Lemon   Age: 23 year old  Sex: female  MRN:  [id number]  Encounter Date:  12/31/2022    Emergency Department History and Physical    Chief Complaint   Patient presents with   • Alcohol Problem              History obtained from: Patient, EMS, PD, EMR    Spike Lemon is a 23 year old female with no chronic PMHx who was brought to the ED via EMS for alcohol intoxication. PD was called tonight because patient was found attempting to enter the wrong residence. Patient reports she was at a DJ event and has been drinking since 2100 yesterday. She states at the event she felt tightness in her chest, so she drank some water but didn't feel better and left. Patient states she \"drank a lot,\" and says she took a Percocet 2 days ago, stating they make her \"feel alive.\" Patient denies any concerns or complaints at this time.     Patient's history and exam are limited secondary to intoxication.     Remainder of ROS as below.    Current Facility-Administered Medications   Medication Dose Route Frequency Provider Last Rate Last Admin   • haloperidol lactate (HALDOL) 5 MG/ML short-acting injection 5 mg  5 mg Intramuscular Once Valdez Coello MD         Current Outpatient Medications   Medication Sig Dispense Refill   • cyclobenzaprine (FLEXERIL) 10 MG tablet TK 1 T PO TID PRF MSP     • albuterol (PROVENTIL HFA) 108 (90 Base) MCG/ACT inhaler Inhale 2 puffs into the lungs every 6 hours as needed.     • triamcinolone (ARISTOCORT) 0.1 % cream Apply topically 2 times daily. 30 g 1       ALLERGIES:   Allergen Reactions   • Peanut   (Food Or Med) HIVES       Past Medical History:   Diagnosis Date   • Allergy    • Eczema    • RAD (reactive airway disease)        History reviewed. No pertinent surgical history.    Social History     Socioeconomic History   • Marital status: Significant other     Spouse name: Not on file   • Number of children: Not on file   • Years of education: Not on file   • Highest education level: Not on  RN to room to introduce self to patient and update board. Pt denies any needs at this time. Will continue to monitor. file   Occupational History   • Not on file   Tobacco Use   • Smoking status: Never   • Smokeless tobacco: Never   Substance and Sexual Activity   • Alcohol use: Yes   • Drug use: Yes     Comment: unknown   • Sexual activity: Not on file   Other Topics Concern   • Not on file   Social History Narrative   • Not on file     Social Determinants of Health     Financial Resource Strain: Not on file   Food Insecurity: Not on file   Transportation Needs: Not on file   Physical Activity: Not on file   Stress: Not on file   Social Connections: Not on file   Intimate Partner Violence: Not At Risk   • Social Determinants: Intimate Partner Violence Past Fear: No   • Social Determinants: Intimate Partner Violence Current Fear: No       Review of Systems   Unable to perform ROS: Other (Intoxicated)         Physical Exam     BP: 120/80  Temp: 97.9 °F (36.6 °C)  Temp src: Oral  Heart Rate: 79  Resp: 18  SpO2: 99 %    Physical Exam  Constitutional:       General: She is not in acute distress.     Appearance: She is not ill-appearing or diaphoretic.   HENT:      Right Ear: External ear normal.      Left Ear: External ear normal.      Nose: No congestion or rhinorrhea.      Mouth/Throat:      Mouth: Mucous membranes are moist.      Pharynx: Oropharynx is clear.      Neck: Normal range of motion.   Eyes:      Extraocular Movements: Extraocular movements intact.      Pupils: Pupils are equal, round, and reactive to light.   Cardiovascular:      Rate and Rhythm: Normal rate.   Pulmonary:      Effort: Pulmonary effort is normal. No respiratory distress.      Breath sounds: No stridor.   Abdominal:      General: Abdomen is flat. There is no distension.   Musculoskeletal:         General: No swelling or deformity. Normal range of motion.   Skin:     General: Skin is warm and dry.      Coloration: Skin is not jaundiced or pale.   Neurological:      General: No focal deficit present.      Mental Status: She is alert.      GCS: GCS eye  subscore is 4. GCS verbal subscore is 5. GCS motor subscore is 6.      Cranial Nerves: No facial asymmetry.      Sensory: Sensation is intact.      Motor: Motor function is intact.      Coordination: Coordination normal.   Psychiatric:         Attention and Perception: She is inattentive.         Mood and Affect: Affect is labile.         Speech: Speech is slurred.         Behavior: Behavior is cooperative.         Thought Content: Thought content does not include homicidal or suicidal ideation.      Comments: Tangential thoughts         No orders to display       Labs Reviewed   CBC WITH DIFFERENTIAL    Narrative:     The following orders were created for panel order CBC with Automated Differential.  Procedure                               Abnormality         Status                     ---------                               -----------         ------                     CBC with Automated Dif...[[id number]]                      Final result                 Please view results for these tests on the individual orders.   CBC WITH AUTOMATED DIFFERENTIAL (PERFORMABLE ONLY)    Narrative:     This is an appended report.  These results have been appended to a previously verified report.   COMPREHENSIVE METABOLIC PANEL   ALCOHOL   DRUG SCREEN, URINE   POCT URINE PREGNANCY       Medications   haloperidol lactate (HALDOL) 5 MG/ML short-acting injection 5 mg (has no administration in time range)       Assessment and Plan       Spike Lemon is a 23 year old female presenting with endorsed alcohol intoxication.  Patient disruptive in the emergency department however able to redirect with verbal prompts.  Patient unable to provide a phone number for friends or family, unable to demonstrate clinical sobriety.  Will continue to monitor in the emergency department.  We discussed the possible use of medications for sedation due to disruptive behavior, however patient noted that she would prefer to sleep.  Will evaluate labs,  monitor in ED.  Attempt to reach friends and family.      ED Course          ED Diagnosis     Diagnosis Comment Associated Orders       Final diagnosis    Alcoholic intoxication without complication (CMS/HCC) -- --          MDM  Multiple diagnoses considered.  Labs consistent with alcohol intoxication, patient endorsed to a.m. physician pending safe disposition.    Patient verbalized understanding and agreement to proceed with the above plan. Available test results were explained and all questions were answered.    New Prescriptions    No medications on file       Disposition        There is no disposition no dispo time  There is no comment    Valdez Coello MD, MPH  Emergency Medicine    Disclaimer Note: Your physician prepared this document using voice recognition technology. In that case, if a word or phrase is confusing, or does not make sense, this is likely due to a recognition error within the program which was not discovered during the provider's review. If you believe an error has occurred, please notify your provider's office at your earliest convenience, so we can correct any mistakes    Charting performed by ED Scribe Maureen Cui for Dr. Coello.    The documentation recorded by the scribe accurately and completely reflects the service(s) I personally performed and the decisions made by me.            Valdez Coello MD  12/31/22 0601

## (undated) DEVICE — SKIN AFFIX SURG ADHESIVE 72/CS 0.55ML: Brand: MEDLINE

## (undated) DEVICE — COVER LT HNDL BLU PLAS

## (undated) DEVICE — SUTURE ABSRB BRAID COAT UD CTX 3-0 36IN VCRL J980H

## (undated) DEVICE — Device: Brand: PORTEX

## (undated) DEVICE — GLOVE,SURG,SENSICARE SLT,LF,PF,6.5: Brand: MEDLINE

## (undated) DEVICE — SAFESECURE,SECUREMENT,FOLEY CATH,STERILE: Brand: MEDLINE

## (undated) DEVICE — CATHETER TRAY 16 FR 5 CC FOL ANTIREFLX SAMPLING PRT DOVER

## (undated) DEVICE — CANISTER, RIGID, 3000CC: Brand: MEDLINE INDUSTRIES, INC.

## (undated) DEVICE — Device

## (undated) DEVICE — SUTURE VCRL SZ 4-0 L27IN ABSRB UD L60MM KS STR REV CUT NDL J662H

## (undated) DEVICE — SOLUTION IV IRRIG POUR BRL 0.9% SODIUM CHL 2F7124

## (undated) DEVICE — DISPOSABLE ORTHOPAEDIC PROTECTOR: Brand: ALEXIS ® ORTHOPAEDIC PROTECTOR

## (undated) DEVICE — POOLE SUCTION INSTRUMENT,RIGID: Brand: ARGYLE

## (undated) DEVICE — CHLORAPREP 26ML ORANGE

## (undated) DEVICE — BAG,SPONGE COUNTER,BLUE,50/BX,5BX/CS: Brand: MEDLINE

## (undated) DEVICE — SUTURE COAT VCRL SZ 0 L36IN ABSRB VLT CTX L48MM TAPERPOINT J370H

## (undated) DEVICE — SUTURE MCRYL SZ 0 L36IN ABSRB VLT L48MM CTX 1/2 CIR Y398H

## (undated) DEVICE — 9165 UNIVERSAL PATIENT PLATE: Brand: 3M™

## (undated) DEVICE — SPONGE LAP W18XL18IN WHT COT 4 PLY FLD STRUNG RADPQ DISP ST